# Patient Record
Sex: FEMALE | Race: WHITE | Employment: OTHER | URBAN - METROPOLITAN AREA
[De-identification: names, ages, dates, MRNs, and addresses within clinical notes are randomized per-mention and may not be internally consistent; named-entity substitution may affect disease eponyms.]

---

## 2017-03-07 DIAGNOSIS — E03.9 ACQUIRED HYPOTHYROIDISM: Primary | ICD-10-CM

## 2017-03-07 RX ORDER — LEVOTHYROXINE SODIUM 50 UG/1
TABLET ORAL
Qty: 30 TAB | Refills: 0 | OUTPATIENT
Start: 2017-03-07

## 2017-03-08 RX ORDER — LEVOTHYROXINE SODIUM 50 UG/1
TABLET ORAL
Qty: 30 TAB | Refills: 0 | Status: SHIPPED | OUTPATIENT
Start: 2017-03-08 | End: 2017-05-08 | Stop reason: SDUPTHER

## 2017-03-08 NOTE — TELEPHONE ENCOUNTER
Patient wants to come in tomorrow to have her thyroid labs drawn. She would need a few pills to last her until the results come back.

## 2017-03-08 NOTE — TELEPHONE ENCOUNTER
Please call pt to instruct her to have her thyroid lab work done asap (needs to be ordered). She was called in Jan but we hadn't heard back from her. Levothyroxine refused to pharmacy with instructions to call us.

## 2017-03-09 ENCOUNTER — HOSPITAL ENCOUNTER (OUTPATIENT)
Dept: LAB | Age: 72
Discharge: HOME | End: 2017-03-09
Payer: MEDICARE

## 2017-03-09 PROCEDURE — 36415 COLL VENOUS BLD VENIPUNCTURE: CPT

## 2017-03-09 PROCEDURE — 84443 ASSAY THYROID STIM HORMONE: CPT

## 2017-03-10 LAB — TSH SERPL DL<=0.005 MIU/L-ACNC: 4.46 UIU/ML (ref 0.45–4.5)

## 2017-03-13 ENCOUNTER — TELEPHONE (OUTPATIENT)
Dept: INTERNAL MEDICINE CLINIC | Age: 72
End: 2017-03-13

## 2017-03-13 NOTE — TELEPHONE ENCOUNTER
----- Message from Sanford Leonard MD sent at 3/12/2017 11:04 PM EDT -----  Please call patient - her TSH is BETTER but most patients feel better when the TSH is within the 1-3 range. She had previously felt better when stopping her thyroid medication, so I will not increase her dose unless she has complaints of fatigue. Let me know, otherwise will leave her dose the same.

## 2017-03-13 NOTE — TELEPHONE ENCOUNTER
Patient called back. I relayed 's message. Patiet said that she is having a little bit of fatigue but will discuss that with  when she comes into the office next month. Also patient would like a copy of the results mailed to her.

## 2017-03-13 NOTE — TELEPHONE ENCOUNTER
LVM on pt's mobile number requesting that she contact the office back to advise of MPL's result message.

## 2017-03-13 NOTE — TELEPHONE ENCOUNTER
Please call patient - her TSH is BETTER but most patients feel better when the TSH is within the 1-3 range. She had previously felt better when stopping her thyroid medication, so I will not increase her dose unless she has complaints of fatigue. Let me know, otherwise will leave her dose the same.

## 2017-04-27 ENCOUNTER — OFFICE VISIT (OUTPATIENT)
Dept: INTERNAL MEDICINE CLINIC | Age: 72
End: 2017-04-27

## 2017-04-27 ENCOUNTER — HOSPITAL ENCOUNTER (OUTPATIENT)
Dept: LAB | Age: 72
Discharge: HOME | End: 2017-04-27
Payer: MEDICARE

## 2017-04-27 VITALS
RESPIRATION RATE: 19 BRPM | TEMPERATURE: 97.6 F | SYSTOLIC BLOOD PRESSURE: 116 MMHG | HEIGHT: 64 IN | BODY MASS INDEX: 24.57 KG/M2 | WEIGHT: 143.9 LBS | OXYGEN SATURATION: 98 % | HEART RATE: 69 BPM | DIASTOLIC BLOOD PRESSURE: 62 MMHG

## 2017-04-27 DIAGNOSIS — E03.9 ACQUIRED HYPOTHYROIDISM: Primary | ICD-10-CM

## 2017-04-27 DIAGNOSIS — R06.02 SHORTNESS OF BREATH ON EXERTION: ICD-10-CM

## 2017-04-27 PROCEDURE — 84436 ASSAY OF TOTAL THYROXINE: CPT

## 2017-04-27 PROCEDURE — 36415 COLL VENOUS BLD VENIPUNCTURE: CPT

## 2017-04-27 PROCEDURE — 84443 ASSAY THYROID STIM HORMONE: CPT

## 2017-04-27 NOTE — PROGRESS NOTES
Chief Complaint   Patient presents with    Thyroid Problem     1. Have you been to the ER, urgent care clinic since your last visit? No  Hospitalized since your last visit? No    2. Have you seen or consulted any other health care providers outside of the [address] [place] [address] since your last visit? No Include any pap smears or colon screening.  No

## 2017-04-27 NOTE — MR AVS SNAPSHOT
Visit Information Date & Time Provider Department Dept. Phone Encounter #  
 4/27/2017  9:20 AM Sonya Mckeon MD [place] [id number] [facility] [phone] [id number] Follow-up Instructions Return in about 3 weeks (around 5/18/2017) for Wellness visit with Susana Ortiz. Upcoming Health Maintenance Date Due ZOSTER VACCINE AGE 60> 10/17/2005 MEDICARE YEARLY EXAM 2/2/2014 GLAUCOMA SCREENING Q2Y 6/25/2014 Pneumococcal 65+ Low/Medium Risk (2 of 2 - PCV13) 9/18/2014 BREAST CANCER SCRN MAMMOGRAM 9/19/2018 COLONOSCOPY 6/9/2019 DTaP/Tdap/Td series (2 - Td) 9/30/2026 Allergies as of 4/27/2017  Review Complete On: 4/27/2017 By: Chintan Whitney Severity Noted Reaction Type Reactions Talwin [Pentazocine Lactate] Medium 09/07/2012   Side Effect Unknown (comments) Altered sensorium Amoxicillin  04/22/2013    Other (comments) Diarrhea, stomach upset Darvon [Propoxyphene]  09/07/2012    Unknown (comments) Diamox  09/07/2012    Other (comments)  
 migraine Morphine  01/19/2011    Other (comments) Pt states she is very sensitive to all narcotics, feels like she will pass out Simvastatin  09/18/2013    Other (comments) Zithromax [Azithromycin]  01/14/2014    Other (comments) depression Current Immunizations  Reviewed on 4/27/2017 Name Date Influenza High Dose Vaccine PF 10/27/2016, 11/16/2015 Influenza Vaccine 10/22/2014, 10/25/2013 Influenza Vaccine Split 9/10/2012 Pneumococcal Polysaccharide (PPSV-23) 9/18/2013  1:05 PM  
 TD Vaccine 1/1/2006 Reviewed by Sonya Mckeon MD on 4/27/2017 at 10:06 AM  
 Reviewed by Sonya Mckeon MD on 4/27/2017 at 10:06 AM  
You Were Diagnosed With   
  
 Codes Comments Acquired hypothyroidism    -  Primary ICD-10-CM: E03.9 ICD-9-CM: 047. 9 Shortness of breath on exertion     ICD-10-CM: R06.02 
ICD-9-CM: 786.05 Vitals BP Pulse Temp Resp Height(growth percentile) Weight(growth percentile) 116/62 (BP 1 Location: Left arm, BP Patient Position: Sitting) 69 97.6 °F (36.4 °C) (Oral) 19 5' 4\" (1.626 m) 143 lb 14.4 oz (65.3 kg) SpO2 BMI OB Status Smoking Status 98% 24.7 kg/m2 Postmenopausal Former Smoker BMI and BSA Data Body Mass Index Body Surface Area 24.7 kg/m 2 1.72 m 2 Preferred Pharmacy Pharmacy Name Phone [address], [address] [phone] Your Updated Medication List  
  
   
This list is accurate as of: 4/27/17 10:08 AM.  Always use your most recent med list.  
  
  
  
  
 ammonium lactate 12 % topical cream  
Commonly known as:  AMLACTIN  
rub in to affected area well  
  
 escitalopram oxalate 10 mg tablet Commonly known as:  Oletha Dubs TAKE 1/2 TABLET BY MOUTH EVERY DAY. levothyroxine 50 mcg tablet Commonly known as:  SYNTHROID  
TAKE 1 TABLET BY MOUTH DAILY BEFORE BREAKFAST. SUMAtriptan 100 mg tablet Commonly known as:  IMITREX Take 1 Tab by mouth once as needed for Migraine. We Performed the Following TSH REFLEX TO T4 [[id number] Custom] Follow-up Instructions Return in about 3 weeks (around 5/18/2017) for Wellness visit with Shelby Reynolds. To-Do List   
 04/27/2017 Imaging:  XR CHEST PA LAT   
  
 04/28/2017 PFT:  PULMONARY FUNCTION TEST Introducing [facility]! Marci Almaraz introduces [employer] patient portal. Now you can access parts of your medical record, email your doctor's office, and request medication refills online. 1. In your internet browser, go to [email or web address] 2. Click on the First Time User? Click Here link in the Sign In box. You will see the New Member Sign Up page. 3. Enter your [employer] Access Code exactly as it appears below. You will not need to use this code after youve completed the sign-up process.  If you do not sign up before the expiration date, you must request a new code. · [employer] Access Code: [email or web address] Expires: 7/26/2017 10:08 AM 
 
4. Enter the last four digits of your Social Security Number (xxxx) and Date of Birth (mm/dd/yyyy) as indicated and click Submit. You will be taken to the next sign-up page. 5. Create a [facility] ID. This will be your [facility] login ID and cannot be changed, so think of one that is secure and easy to remember. 6. Create a [employer] password. You can change your password at any time. 7. Enter your Password Reset Question and Answer. This can be used at a later time if you forget your password. 8. Enter your e-mail address. You will receive e-mail notification when new information is available in [address] 9. Click Sign Up. You can now view and download portions of your medical record. 10. Click the Download Summary menu link to download a portable copy of your medical information. If you have questions, please visit the Frequently Asked Questions section of the [employer] website. Remember, [facility] is NOT to be used for urgent needs. For medical emergencies, dial 911. Now available from your iPhone and Android! Please provide this summary of care documentation to your next provider. Your primary care clinician is listed as Trish Zhang. If you have any questions after today's visit, please call [phone].

## 2017-04-27 NOTE — PROGRESS NOTES
HPI:  Betito Clark is a 70y.o. year old female who returns to clinic today for routine follow up appointment to discuss the issues below:    Here for f/u of hypothyroidism. She had gone off of levothyroxine prior to our Oct visit. Repeat TSH was 11.69 off med. She restarted 50 mcg daily. Recheck on 3/9 was 4.46. I didn't change her dose as she had previously told me that she hadn't felt well on a higher dose (reason for stopping altogether). On her own she increased to 75 mcg of levothyroxine. After 3/9. She continues to c/o intermittent fatigue. Also notes she gets winded with a slight incline. Others have pointed this out as well - friends / her brother. Her sister used to tell her she wheezes on the phone. No chest pain. Remote h/o tobacco use - 1/2 pack per week at most for 10 years, forty years ago. Had a nuclear stress test in 2013 for chest pain while doing water aerobics. Coronary calcium score of zero 12/2015. Prior to Admission medications    Medication Sig Start Date End Date Taking? Authorizing Provider   levothyroxine (SYNTHROID) 50 mcg tablet TAKE 1 TABLET BY MOUTH DAILY BEFORE BREAKFAST. 3/8/17  Yes Keli Bethea MD   escitalopram oxalate (LEXAPRO) 10 mg tablet TAKE 1/2 TABLET BY MOUTH EVERY DAY. 12/11/16  Yes Keli Bethea MD   ammonium lactate (AMLACTIN) 12 % topical cream rub in to affected area well 6/2/16  Yes Keli Bethea MD   SUMAtriptan (IMITREX) 100 mg tablet Take 1 Tab by mouth once as needed for Migraine.  1/14/14  Yes MORRIS Kebede          Allergies   Allergen Reactions    Talwin [Pentazocine Lactate] Unknown (comments)     Altered sensorium    Amoxicillin Other (comments)     Diarrhea, stomach upset    Darvon [Propoxyphene] Unknown (comments)    Diamox Other (comments)     migraine    Morphine Other (comments)     Pt states she is very sensitive to all narcotics, feels like she will pass out    Simvastatin Other (comments)    Zithromax [Azithromycin] Other (comments)     depression           ROS  See HPI      Physical Exam   Constitutional: She appears well-nourished. Neck: Carotid bruit is not present. No thyroid mass and no thyromegaly present. Cardiovascular: Normal rate, regular rhythm and normal heart sounds. No murmur heard. Pulses:       Carotid pulses are 2+ on the right side, and 2+ on the left side. Pulmonary/Chest: Effort normal and breath sounds normal.   Abdominal: Soft. Bowel sounds are normal. There is no hepatosplenomegaly. There is no tenderness. Musculoskeletal: She exhibits no edema. Visit Vitals    /62 (BP 1 Location: Left arm, BP Patient Position: Sitting)    Pulse 69    Temp 97.6 °F (36.4 °C) (Oral)    Resp 19    Ht 5' 4\" (1.626 m)    Wt 143 lb 14.4 oz (65.3 kg)    SpO2 98%    BMI 24.7 kg/m2         Assessment & Plan:  Joan Che was seen today for thyroid problem. Diagnoses and all orders for this visit:    Acquired hypothyroidism  Still fatigued. Recheck thyroid function after dose adjustment .   -     TSH REFLEX TO T4    Shortness of breath on exertion  Only seems to be on an incline but this has been a persistent complaint. I think it warrants further workup at this point. Will do a CXR today and have her schedule PFTs. I have a low suspicion for coronary disease as she had a normal stress test 3 1/2 years ago and a normal CT calcium score a year ago. -     XR CHEST PA LAT; Future  -     PULMONARY FUNCTION TEST; Future      Follow-up Disposition:  Return in about 3 weeks (around 5/18/2017) for Wellness visit with Harriet Galarza. Advised her to call back or return to office if symptoms worsen/change/persist.  Discussed expected course/resolution/complications of diagnosis in detail with patient. Medication risks/benefits/costs/interactions/alternatives discussed with patient.   She was given an after visit summary which includes diagnoses, current medications, & vitals. She expressed understanding with the diagnosis and plan.

## 2017-04-29 LAB
T4 SERPL-MCNC: 8.2 UG/DL (ref 4.5–12)
TSH SERPL DL<=0.005 MIU/L-ACNC: 5.08 UIU/ML (ref 0.45–4.5)

## 2017-05-05 ENCOUNTER — HOSPITAL ENCOUNTER (OUTPATIENT)
Dept: GENERAL RADIOLOGY | Age: 72
Discharge: HOME | End: 2017-05-05
Attending: INTERNAL MEDICINE
Payer: MEDICARE

## 2017-05-05 ENCOUNTER — HOSPITAL ENCOUNTER (OUTPATIENT)
Dept: PULMONOLOGY | Age: 72
Discharge: HOME | End: 2017-05-05
Attending: INTERNAL MEDICINE
Payer: MEDICARE

## 2017-05-05 DIAGNOSIS — R06.02 SHORTNESS OF BREATH ON EXERTION: ICD-10-CM

## 2017-05-05 PROCEDURE — 71020 XR CHEST PA LAT: CPT

## 2017-05-05 PROCEDURE — 94010 BREATHING CAPACITY TEST: CPT

## 2017-05-08 ENCOUNTER — TELEPHONE (OUTPATIENT)
Dept: INTERNAL MEDICINE CLINIC | Age: 72
End: 2017-05-08

## 2017-05-08 RX ORDER — LEVOTHYROXINE SODIUM 88 UG/1
88 TABLET ORAL
Qty: 30 TAB | Refills: 1 | Status: SHIPPED | OUTPATIENT
Start: 2017-05-08 | End: 2017-07-13 | Stop reason: SDUPTHER

## 2017-05-08 NOTE — TELEPHONE ENCOUNTER
Left home and mobile VM requesting Ms. Abhishek Adrian to contact the office back at her earliest convenience to advise of Alber Rhoades MD's result note and/or recommendations regarding her thyroid abd chest xray.

## 2017-05-08 NOTE — PROGRESS NOTES
Please call patient- her thyroid dose is still too low (she is takign 75 mcg daily). I recommend a small dose increase to 88 mcg daily. I have sent this to her pharmacy. I recommend a follow up with Renae Santo or new chosen provider in 6 weeks.

## 2017-05-08 NOTE — TELEPHONE ENCOUNTER
----- Message from [address], MD sent at 5/8/2017 12:05 AM EDT -----  Please call patient- her thyroid dose is still too low (she is takign 75 mcg daily). I recommend a small dose increase to 88 mcg daily. I have sent this to her pharmacy. I recommend a follow up with Adam Duff or new chosen provider in 6 weeks.

## 2017-05-09 NOTE — TELEPHONE ENCOUNTER
Spoke to pt - pt verified self and  for privacy precautions. Destiny Molina was advised of the recommendations/results provided by Jonathan Pisano MD . Ms. Everett Weller acknowledged understanding and all questions were answered to patients satisfaction. Pt states she would like the results to her pulmonary function test that was recently completed if it is available.

## 2017-05-10 ENCOUNTER — DOCUMENTATION ONLY (OUTPATIENT)
Dept: INTERNAL MEDICINE CLINIC | Age: 72
End: 2017-05-10

## 2017-05-10 NOTE — TELEPHONE ENCOUNTER
Her pulmonary function tests were reviewed today and were normal - she does not have asthma or COPD. If she develops any worsening of her shortness of breath I recommend to the office for further evaluation. For now I would recommend that she continue her regular exercise as she has been.

## 2017-05-10 NOTE — PROCEDURES
[address]   [address], [address]   PULMONARY FUNCTION       Name:  Jacinto Bowers   MR#:  [id number]   :  1945   Account #:  [de-identified]    Date of Procedure:  2017   Date of Adm:  2017       INDICATIONS: Shortness of breath. FINDINGS   SPIROMETRY   Normal FVC, normal FEV1, normal FEV1/FVC ratio. IMPRESSION: Normal spirometry.          MD Rajan Jones / Kirstie.Emery   D:  2017   15:30   T:  05/10/2017   01:49   Job #:  [id number]

## 2017-05-11 NOTE — TELEPHONE ENCOUNTER
Spoke to pt - pt verified self and  for privacy precautions. Destiny Molina was advised of the recommendations/results provided by Jonathan Pisano MD.  acknowledged understanding and all questions were answered to patients satisfaction. No further questions or concerns at this time.

## 2017-05-15 ENCOUNTER — OFFICE VISIT (OUTPATIENT)
Dept: INTERNAL MEDICINE CLINIC | Age: 72
End: 2017-05-15

## 2017-05-15 VITALS
SYSTOLIC BLOOD PRESSURE: 132 MMHG | HEART RATE: 67 BPM | OXYGEN SATURATION: 98 % | TEMPERATURE: 97.3 F | HEIGHT: 64 IN | BODY MASS INDEX: 24.59 KG/M2 | WEIGHT: 144 LBS | RESPIRATION RATE: 18 BRPM | DIASTOLIC BLOOD PRESSURE: 70 MMHG

## 2017-05-15 DIAGNOSIS — Z00.00 MEDICARE ANNUAL WELLNESS VISIT, SUBSEQUENT: Primary | ICD-10-CM

## 2017-05-15 DIAGNOSIS — Z23 NEED FOR PNEUMOCOCCAL VACCINATION: ICD-10-CM

## 2017-05-15 DIAGNOSIS — F32.A DEPRESSION, UNSPECIFIED DEPRESSION TYPE: ICD-10-CM

## 2017-05-15 DIAGNOSIS — Z78.0 MENOPAUSE: ICD-10-CM

## 2017-05-15 RX ORDER — ESCITALOPRAM OXALATE 10 MG/1
TABLET ORAL
Qty: 45 TAB | Refills: 1 | Status: SHIPPED | OUTPATIENT
Start: 2017-05-15 | End: 2017-06-26

## 2017-05-15 NOTE — PATIENT INSTRUCTIONS
Schedule of Personalized Health Plan  (Provide Copy to Patient)  The best way to stay healthy is to live a healthy lifestyle. A healthy lifestyle includes regular exercise, eating a well-balanced diet, keeping a healthy weight and not smoking. Regular physical exams and screening tests are another important way to take care of yourself. Preventive exams provided by health care providers can find health problems early when treatment works best and can keep you from getting certain diseases or illnesses. Preventive services include exams, lab tests, screenings, shots, monitoring and information to help you take care of your own health. All people over 65 should have a pneumonia shot. Pneumonia shots are usually only needed once in a lifetime unless your doctor decides differently. All people over 65 should have a yearly flu shot. People over 65 are at medium to high risk for Hepatitis B. Three shots are needed for complete protection. In addition to your physical exam, some screening tests are recommended:    Bone mass measurement (dexa scan) is recommended every two years  Diabetes Mellitus screening is recommended every year. Glaucoma is an eye disease caused by high pressure in the eye. An eye exam is recommended every year. Cardiovascular screening tests that check your cholesterol and other blood fat (lipid) levels are recommended every five years. Colorectal Cancer screening tests help to find pre-cancerous polyps (growths in the colon) so they can be removed before they turn into cancer. Tests ordered for screening depend on your personal and family history risk factors.     Screening for Breast Cancer is recommended yearly with a mammogram.    Screening for Cervical Cancer is recommended every two years (annually for certain risk factors, such as previous history of STD or abnormal PAP in past 7 years), with a Pelvic Exam with PAP    Here is a list of your current Health Maintenance items with a due date:  Health Maintenance   Topic Date Due    ZOSTER VACCINE AGE 60>  10/17/2005    GLAUCOMA SCREENING Q2Y  06/25/2014    Pneumococcal 65+ Low/Medium Risk (2 of 2 - PCV13) 09/18/2014    INFLUENZA AGE 9 TO ADULT  08/01/2017    MEDICARE YEARLY EXAM  05/16/2018    BREAST CANCER SCRN MAMMOGRAM  09/19/2018    COLONOSCOPY  06/09/2019    DTaP/Tdap/Td series (2 - Td) 09/30/2026    Hepatitis C Screening  Completed    OSTEOPOROSIS SCREENING (DEXA)  Completed

## 2017-05-15 NOTE — PROGRESS NOTES
Linette Gitelman is a 70 y.o. female and presents for annual Medicare Wellness Visit. Problem List: Reviewed with patient and discussed risk factors. Patient Active Problem List   Diagnosis Code    Acquired hypothyroidism E03.9    Hypercholesteremia E78.00    History of colonoscopy Z98.890    Environmental allergies Z91.09    Xerosis of skin L85.3    Vitamin D deficiency E55.9    Osteopenia M85.80    Adjustment disorder F43.20    Migraine headache G43.909    Family history of breast cancer in first degree relative Z80.3       Current medical providers:  Patient Care Team:  Loc Perea MD as PCP - General (Internal Medicine)  Michaela Murphy MD (Otolaryngology)  Sandi Whitehead MD (Otolaryngology)  Marizol Hdez MD (Dermatology)  Lisa Keys MD (Ophthalmology)    PSH: Reviewed with patient  Past Surgical History:   Procedure Laterality Date    ENDOSCOPY, COLON, DIAGNOSTIC  2009    neg. ([facility])-f/u 5 yrs.  HX DILATION AND CURETTAGE  1986    benign    HX HEENT  childhood    T&A        SH: Reviewed with patient  Social History   Substance Use Topics    Smoking status: Former Smoker     Years: 15.00    Smokeless tobacco: Never Used    Alcohol use 1.2 - 1.8 oz/week     0 - 1 Standard drinks or equivalent, 2 Glasses of wine per week      Comment: occasional       FH: Reviewed with patient  Family History   Problem Relation Age of Onset    Hypertension Mother     Heart Disease Mother 61     CHF/etoh    Arrhythmia Mother     Diabetes Father     Coronary Artery Disease Father 61     CABG (twice)   [facility] Breast Cancer Sister 61     (cured)       Medications/Allergies: Reviewed with patient  Current Outpatient Prescriptions on File Prior to Visit   Medication Sig Dispense Refill    levothyroxine (SYNTHROID) 88 mcg tablet Take 1 Tab by mouth Daily (before breakfast).  30 Tab 1    ammonium lactate (AMLACTIN) 12 % topical cream rub in to affected area well 280 g 3    SUMAtriptan (IMITREX) 100 mg tablet Take 1 Tab by mouth once as needed for Migraine. 9 Tab 1     No current facility-administered medications on file prior to visit. Allergies   Allergen Reactions    Talwin [Pentazocine Lactate] Unknown (comments)     Altered sensorium    Amoxicillin Other (comments)     Diarrhea, stomach upset    Darvon [Propoxyphene] Unknown (comments)    Diamox Other (comments)     migraine    Morphine Other (comments)     Pt states she is very sensitive to all narcotics, feels like she will pass out    Simvastatin Other (comments)    Zithromax [Azithromycin] Other (comments)     depression       Objective:  Visit Vitals    /70 (BP 1 Location: Left arm, BP Patient Position: Sitting)    Pulse 67    Temp 97.3 °F (36.3 °C) (Oral)    Resp 18    Ht 5' 4\" (1.626 m)    Wt 144 lb (65.3 kg)    SpO2 98%    BMI 24.72 kg/m2    Body mass index is 24.72 kg/(m^2). Assessment of cognitive impairment: Alert and oriented x 3    Depression Screen:   PHQ over the last two weeks 5/15/2017   PHQ Not Done -   Little interest or pleasure in doing things Not at all   Feeling down, depressed or hopeless Not at all   Total Score PHQ 2 0       Fall Risk Assessment:    Fall Risk Assessment, last 12 mths 5/15/2017   Able to walk? Yes   Fall in past 12 months? No       Functional Ability:   Does the patient exhibit a steady gait? yes   How long did it take the patient to get up and walk from a sitting position? 2 seconds   Is the patient self reliant?  (ie can do own laundry, meals, household chores)  yes     Does the patient handle his/her own medications? yes     Does the patient handle his/her own money? yes     Is the patients home safe (ie good lighting, handrails on stairs and bath, etc.)? yes     Did you notice or did patient express any hearing difficulties? no     Did you notice or did patient express any vision difficulties?   no     Were distance and reading eye charts used?   no       Advance Care Planning:   Patient was offered the opportunity to discuss advance care planning:  yes     Does patient have an Advance Directive:  yes   If no, did you provide information on Caring Connections?  no       Plan:      Orders Placed This Encounter    DEXA BONE DENSITY STUDY AXIAL    pneumococcal 13 anthony conj dip (PREVNAR-13) 0.5 mL syrg injection    escitalopram oxalate (LEXAPRO) 10 mg tablet       Health Maintenance   Topic Date Due    ZOSTER VACCINE AGE 60>  10/17/2005    GLAUCOMA SCREENING Q2Y  06/25/2014    Pneumococcal 65+ Low/Medium Risk (2 of 2 - PCV13) 09/18/2014    INFLUENZA AGE 9 TO ADULT  08/01/2017    MEDICARE YEARLY EXAM  05/16/2018    BREAST CANCER SCRN MAMMOGRAM  09/19/2018    COLONOSCOPY  06/09/2019    DTaP/Tdap/Td series (2 - Td) 09/30/2026    Hepatitis C Screening  Completed    OSTEOPOROSIS SCREENING (DEXA)  Completed       *Patient verbalized understanding and agreement with the plan. A copy of the After Visit Summary with personalized health plan was given to the patient today. Patient up to date on tdap, flu, and pneumovax. Prevnar ordered. Patient believes she has had Zostavax and will try to get record. Patient reports exercising frequently. She does water aerobics, cycling, and walking. She reports a healthy diet. She typically has oatmeal with fruit and nuts for breakfast, salad or sandwich for lunch, and lean meat with vegetable for dinner. Patient states she has advance medical directive and will bring to next appointment.

## 2017-05-15 NOTE — PROGRESS NOTES
Chief Complaint   Patient presents with   [facility] Annual Wellness Visit     1. Have you been to the ER, urgent care clinic since your last visit? N  Hospitalized since your last visit? No    2. Have you seen or consulted any other health care providers outside of the [address] [place] [address] since your last visit? No    Include any pap smears or colon screening.  No

## 2017-06-17 DIAGNOSIS — L85.3 XEROSIS OF SKIN: ICD-10-CM

## 2017-06-20 RX ORDER — AMMONIUM LACTATE 12 G/100G
CREAM TOPICAL
Qty: 280 G | Refills: 0 | Status: SHIPPED | OUTPATIENT
Start: 2017-06-20 | End: 2017-09-01 | Stop reason: SDUPTHER

## 2017-06-26 ENCOUNTER — OFFICE VISIT (OUTPATIENT)
Dept: INTERNAL MEDICINE CLINIC | Age: 72
End: 2017-06-26

## 2017-06-26 VITALS
WEIGHT: 146.6 LBS | DIASTOLIC BLOOD PRESSURE: 62 MMHG | RESPIRATION RATE: 16 BRPM | TEMPERATURE: 96.3 F | BODY MASS INDEX: 25.03 KG/M2 | OXYGEN SATURATION: 97 % | SYSTOLIC BLOOD PRESSURE: 130 MMHG | HEIGHT: 64 IN | HEART RATE: 67 BPM

## 2017-06-26 DIAGNOSIS — E03.9 ACQUIRED HYPOTHYROIDISM: ICD-10-CM

## 2017-06-26 DIAGNOSIS — E78.00 HYPERCHOLESTEREMIA: ICD-10-CM

## 2017-06-26 DIAGNOSIS — E55.9 VITAMIN D DEFICIENCY: ICD-10-CM

## 2017-06-26 DIAGNOSIS — M85.89 OSTEOPENIA OF MULTIPLE SITES: ICD-10-CM

## 2017-06-26 DIAGNOSIS — R06.00 DYSPNEA, UNSPECIFIED TYPE: Primary | ICD-10-CM

## 2017-06-26 NOTE — PROGRESS NOTES
Thyroid Problem ((Rm #10) 6wk f/u) and Medication Evaluation (levothyroxine dosage increased to 88mcg daily per Dr. July Lynn @ 4/27/17 visit and asked to f/u with new PCP in 6 wks)       HPI:  Mercedes Rascon is a 70y.o. year old female who is here to establish care. She  had her medical care:      [facility]    She reports the following history and medical concerns:      Hypothyroidism- recently increased to 88 mcg from 75 mcg. Was taking 5 mg lexapro and feels fine. Hx of cholesterol issues    SOB with exertion. No CP. Normally she can hold a conversation. Mostly taking a hill. Hx of right kidney stones. Assessment and Plan        1. Dyspnea, unspecified type  Will do stress test as she noticed less exercise tolerance without chest pain. - METABOLIC PANEL, COMPREHENSIVE  - CBC WITH AUTOMATED DIFF  - STRESS TEST CARDIAC; Future  - CBC WITH AUTOMATED DIFF; Future  - METABOLIC PANEL, COMPREHENSIVE; Future  - MICROALBUMIN, UR, RAND W/ MICROALBUMIN/CREA RATIO; Future    2. Osteopenia of multiple sites  Recheck DXA.   - DEXA BONE DENSITY STUDY AXIAL; Future    3. Acquired hypothyroidism  Recheck thyroid with new dose. No symptoms.  - TSH REFLEX TO T4  - TSH 3RD GENERATION; Future    4. Hypercholesteremia  Discussed chol. Will check again. Diet discussion.  - LIPID PANEL  - LIPID PANEL; Future  - URINALYSIS W/ RFLX MICROSCOPIC; Future    5. Vitamin D deficiency  May need to restart vit D.  - VITAMIN D, 25 HYDROXY; Future      6. Anxiety- wants to wean off lexapro-  Doesn't feel she needs it. Gave instructions how to wean off.         Visit Vitals    /62 (BP 1 Location: Left arm, BP Patient Position: Sitting)    Pulse 67    Temp 96.3 °F (35.7 °C) (Oral)    Resp 16    Ht 5' 4\" (1.626 m)    Wt 146 lb 9.6 oz (66.5 kg)    SpO2 97%    BMI 25.16 kg/m2       Historical Data    Past Medical History:   Diagnosis Date    Depression     Headache     migraines    Hypercholesterolemia     Hypothyroid     Nephrolithiasis     Scoliosis        Past Surgical History:   Procedure Laterality Date    ENDOSCOPY, COLON, DIAGNOSTIC  2009    neg. ([facility])-f/u 5 yrs.  HX DILATION AND CURETTAGE  1986    benign    HX HEENT  childhood    T&A       Outpatient Encounter Prescriptions as of 6/26/2017   Medication Sig Dispense Refill    ammonium lactate (AMLACTIN) 12 % topical cream RUB IN TO AFFECTED AREA WELL. 280 g 0    levothyroxine (SYNTHROID) 88 mcg tablet Take 1 Tab by mouth Daily (before breakfast). 30 Tab 1    escitalopram oxalate (LEXAPRO) 10 mg tablet TAKE 1/2 TABLET BY MOUTH EVERY DAY. 45 Tab 1    SUMAtriptan (IMITREX) 100 mg tablet Take 1 Tab by mouth once as needed for Migraine. 9 Tab 1     No facility-administered encounter medications on file as of 6/26/2017. Allergies   Allergen Reactions    Talwin [Pentazocine Lactate] Unknown (comments)     Altered sensorium    Amoxicillin Other (comments)     Diarrhea, stomach upset    Darvon [Propoxyphene] Unknown (comments)    Diamox Other (comments)     migraine    Morphine Other (comments)     Pt states she is very sensitive to all narcotics, feels like she will pass out    Simvastatin Other (comments)    Zithromax [Azithromycin] Other (comments)     depression        Social History     Social History    Marital status:      Spouse name: N/A    Number of children: N/A    Years of education: N/A     Occupational History    Not on file.      Social History Main Topics    Smoking status: Former Smoker     Years: 15.00    Smokeless tobacco: Never Used    Alcohol use 1.2 - 1.8 oz/week     0 - 1 Standard drinks or equivalent, 2 Glasses of wine per week      Comment: occasional    Drug use: No    Sexual activity: Yes     Partners: Male     Other Topics Concern    Not on file     Social History Narrative        family history includes Arrhythmia in her mother; Breast Cancer (age of onset: 61) in her sister; Coronary Artery Disease (age of onset: 61) in her father; Diabetes in her father; Heart Disease (age of onset: 61) in her mother; Hypertension in her mother. Review of Systems   Constitutional: Negative for weight loss. Eyes: Negative for blurred vision. Respiratory: Negative for shortness of breath. Cardiovascular: Negative for chest pain. Gastrointestinal: Negative for abdominal pain. Genitourinary: Negative for dysuria and frequency. Skin: Negative for rash. Neurological: Negative for dizziness, focal weakness, weakness and headaches. Endo/Heme/Allergies: Negative for environmental allergies. Does not bruise/bleed easily. Physical Exam   Constitutional: She appears well-developed and well-nourished. She is active. Non-toxic appearance. She does not have a sickly appearance. She does not appear ill. No distress. Eyes: Conjunctivae are normal. Right eye exhibits no discharge. Cardiovascular: Normal rate, regular rhythm, S1 normal, S2 normal, normal heart sounds and normal pulses. Exam reveals no gallop and no friction rub. Pulmonary/Chest: Effort normal and breath sounds normal. No respiratory distress. Abdominal: Soft. Bowel sounds are normal.   Musculoskeletal: She exhibits no edema or deformity. Neurological: She is alert. Skin: Skin is warm and dry. No rash noted. No pallor. Psychiatric: She has a normal mood and affect. Her behavior is normal.   Vitals reviewed. Ortho Exam       No orders of the defined types were placed in this encounter. I have reviewed the patient's medical history in detail and updated the computerized patient record. We had a prolonged discussion about these complex clinical issues and went over the various important aspects to consider. All questions were answered.      Advised her to call back or return to office if symptoms do not improve, change in nature, or persist.    She was given an after visit summary or informed of [employer] Access which includes patient instructions, diagnoses, current medications, & vitals. She expressed understanding with the diagnosis and plan.

## 2017-06-26 NOTE — PROGRESS NOTES
Identified pt with two pt identifiers(name and ). Reviewed record in preparation for visit and have obtained necessary documentation. Chief Complaint   Patient presents with    Thyroid Problem     (Rm #10) 6wk f/u    Medication Evaluation     levothyroxine dosage increased to 88mcg daily per Dr. Tung Ta @ 17 visit and asked to f/u with new PCP in 6 wks        Health Maintenance Due   Topic    ZOSTER VACCINE AGE 60>     GLAUCOMA SCREENING Q2Y     Pneumococcal 65+ Low/Medium Risk (2 of 2 - PCV13)       Coordination of Care Questionnaire:  :   1) Have you been to an emergency room, urgent care clinic since your last visit? no   Hospitalized since your last visit? no             2. Have seen or consulted any other health care provider since your last visit? NO  If yes, where when, and reason for visit? Patient is accompanied by self I have received verbal consent from Amy Navarrete to discuss any/all medical information while they are present in the room.

## 2017-06-27 ENCOUNTER — HOSPITAL ENCOUNTER (OUTPATIENT)
Dept: LAB | Age: 72
Discharge: HOME | End: 2017-06-27
Payer: MEDICARE

## 2017-06-27 PROCEDURE — 80061 LIPID PANEL: CPT

## 2017-06-27 PROCEDURE — 80053 COMPREHEN METABOLIC PANEL: CPT

## 2017-06-27 PROCEDURE — 36415 COLL VENOUS BLD VENIPUNCTURE: CPT

## 2017-06-27 PROCEDURE — 84443 ASSAY THYROID STIM HORMONE: CPT

## 2017-06-27 PROCEDURE — 85025 COMPLETE CBC W/AUTO DIFF WBC: CPT

## 2017-06-28 LAB
ALBUMIN SERPL-MCNC: 4.3 G/DL (ref 3.5–4.8)
ALBUMIN/GLOB SERPL: 2 {RATIO} (ref 1.2–2.2)
ALP SERPL-CCNC: 53 IU/L (ref 39–117)
ALT SERPL-CCNC: 19 IU/L (ref 0–32)
AST SERPL-CCNC: 24 IU/L (ref 0–40)
BASOPHILS # BLD AUTO: 0 X10E3/UL (ref 0–0.2)
BASOPHILS NFR BLD AUTO: 1 %
BILIRUB SERPL-MCNC: 0.3 MG/DL (ref 0–1.2)
BUN SERPL-MCNC: 14 MG/DL (ref 8–27)
BUN/CREAT SERPL: 21 (ref 12–28)
CALCIUM SERPL-MCNC: 9.4 MG/DL (ref 8.7–10.3)
CHLORIDE SERPL-SCNC: 103 MMOL/L (ref 96–106)
CHOLEST SERPL-MCNC: 259 MG/DL (ref 100–199)
CO2 SERPL-SCNC: 23 MMOL/L (ref 18–29)
CREAT SERPL-MCNC: 0.67 MG/DL (ref 0.57–1)
EOSINOPHIL # BLD AUTO: 0.1 X10E3/UL (ref 0–0.4)
EOSINOPHIL NFR BLD AUTO: 3 %
ERYTHROCYTE [DISTWIDTH] IN BLOOD BY AUTOMATED COUNT: 14.1 % (ref 12.3–15.4)
GLOBULIN SER CALC-MCNC: 2.2 G/DL (ref 1.5–4.5)
GLUCOSE SERPL-MCNC: 89 MG/DL (ref 65–99)
HCT VFR BLD AUTO: 44.3 % (ref 34–46.6)
HDLC SERPL-MCNC: 54 MG/DL
HGB BLD-MCNC: 14.2 G/DL (ref 11.1–15.9)
IMM GRANULOCYTES # BLD: 0 X10E3/UL (ref 0–0.1)
IMM GRANULOCYTES NFR BLD: 0 %
INTERPRETATION, 910389: NORMAL
LDLC SERPL CALC-MCNC: 181 MG/DL (ref 0–99)
LYMPHOCYTES # BLD AUTO: 1.5 X10E3/UL (ref 0.7–3.1)
LYMPHOCYTES NFR BLD AUTO: 42 %
MCH RBC QN AUTO: 29.3 PG (ref 26.6–33)
MCHC RBC AUTO-ENTMCNC: 32.1 G/DL (ref 31.5–35.7)
MCV RBC AUTO: 92 FL (ref 79–97)
MONOCYTES # BLD AUTO: 0.4 X10E3/UL (ref 0.1–0.9)
MONOCYTES NFR BLD AUTO: 10 %
NEUTROPHILS # BLD AUTO: 1.6 X10E3/UL (ref 1.4–7)
NEUTROPHILS NFR BLD AUTO: 44 %
PLATELET # BLD AUTO: 219 X10E3/UL (ref 150–379)
POTASSIUM SERPL-SCNC: 4.4 MMOL/L (ref 3.5–5.2)
PROT SERPL-MCNC: 6.5 G/DL (ref 6–8.5)
RBC # BLD AUTO: 4.84 X10E6/UL (ref 3.77–5.28)
SODIUM SERPL-SCNC: 143 MMOL/L (ref 134–144)
TRIGL SERPL-MCNC: 122 MG/DL (ref 0–149)
TSH SERPL DL<=0.005 MIU/L-ACNC: 0.16 UIU/ML (ref 0.45–4.5)
VLDLC SERPL CALC-MCNC: 24 MG/DL (ref 5–40)
WBC # BLD AUTO: 3.5 X10E3/UL (ref 3.4–10.8)

## 2017-06-29 NOTE — PROGRESS NOTES
LMOM to call to discuss thyroid and cholesterol. Thyroid is overactive now.   Signed electronically by: Harlon Olszewski, MD at 8:13 AM on June 29, 2017

## 2017-06-30 ENCOUNTER — TELEPHONE (OUTPATIENT)
Dept: INTERNAL MEDICINE CLINIC | Age: 72
End: 2017-06-30

## 2017-06-30 NOTE — PROGRESS NOTES
Talked with patient. Reduce cheese intake.   Change thyroid dose to 75 on weekends and 88 on weekdays  Signed electronically by: Sherren Lips, MD at 12:26 PM on June 30, 2017

## 2017-06-30 NOTE — TELEPHONE ENCOUNTER
Called in prescription as instructed for patient, to [facility] pharmacy.  Per Dr. Cande Ascencio

## 2017-06-30 NOTE — TELEPHONE ENCOUNTER
Called patient    Zander Margarito    Can you call in to her [facility] [address]    Take synthroid (generic ok)    88 mcg once a day on Monday and Friday  75 mcg once a day on Saturday and Sunday.     Call in one month with 12 refills

## 2017-06-30 NOTE — TELEPHONE ENCOUNTER
----- Message from Omid Holliday sent at 6/30/2017  8:22 AM EDT -----  Regarding: /telephone  Pt is returning call to doctor. Best contact [phone].

## 2017-07-13 ENCOUNTER — HOSPITAL ENCOUNTER (OUTPATIENT)
Dept: BONE DENSITY | Age: 72
Discharge: HOME | End: 2017-07-13
Attending: INTERNAL MEDICINE
Payer: MEDICARE

## 2017-07-13 ENCOUNTER — TELEPHONE (OUTPATIENT)
Dept: INTERNAL MEDICINE CLINIC | Age: 72
End: 2017-07-13

## 2017-07-13 DIAGNOSIS — M85.89 OSTEOPENIA OF MULTIPLE SITES: ICD-10-CM

## 2017-07-13 PROCEDURE — 77080 DXA BONE DENSITY AXIAL: CPT

## 2017-07-13 RX ORDER — LEVOTHYROXINE SODIUM 75 UG/1
75 TABLET ORAL
Qty: 30 TAB | Refills: 12 | Status: SHIPPED | OUTPATIENT
Start: 2017-07-13 | End: 2018-01-18 | Stop reason: SDUPTHER

## 2017-07-13 NOTE — TELEPHONE ENCOUNTER
Patient called stating that she is having headaches everyday. She thinks it may be her thyroid medication and wants to know 's thoughts on changing it.  Please call her back at [phone]

## 2017-07-13 NOTE — TELEPHONE ENCOUNTER
Ever since on the 88 mcg, she has had a headache like a bathing cap on top of head. No visual symptoms. No dizziness or fast HR. She stopped her meds completely because of it.    - go back to taking the 75 mcg and see if HA improves. If it doesn't I need to see you. Patient expressed understanding.     Signed electronically by: Nicole Hurtado MD at 12:26 PM on July 13, 2017

## 2017-07-21 ENCOUNTER — HOSPITAL ENCOUNTER (OUTPATIENT)
Dept: NON INVASIVE DIAGNOSTICS | Age: 72
Discharge: HOME | End: 2017-07-21
Attending: INTERNAL MEDICINE
Payer: MEDICARE

## 2017-07-21 DIAGNOSIS — R06.00 DYSPNEA, UNSPECIFIED TYPE: ICD-10-CM

## 2017-07-21 LAB
ATTENDING PHYSICIAN, CST07: NORMAL
DIAGNOSIS, 93000: NORMAL
DUKE TM SCORE RESULT, CST14: NORMAL
DUKE TREADMILL SCORE, CST13: NORMAL
ECG INTERP BEFORE EX, CST11: NORMAL
ECG INTERP DURING EX, CST12: NORMAL
FUNCTIONAL CAPACITY, CST17: NORMAL
KNOWN CARDIAC CONDITION, CST08: NORMAL
MAX. DIASTOLIC BP, CST04: 104 MMHG
MAX. HEART RATE, CST05: 142 BPM
MAX. SYSTOLIC BP, CST03: 182 MMHG
OVERALL BP RESPONSE TO EXERCISE, CST16: NORMAL
OVERALL HR RESPONSE TO EXERCISE, CST15: NORMAL
PEAK EX METS, CST10: 10.1 METS
PROTOCOL NAME, CST01: NORMAL
TEST INDICATION, CST09: NORMAL

## 2017-07-21 PROCEDURE — 93017 CV STRESS TEST TRACING ONLY: CPT

## 2017-07-21 NOTE — PROGRESS NOTES
Inform patient that the back and hips were now osteoporotic but the arm was. Try doing weight bearing exercise for the arms by doing wall push ups or push ups with her knees down on the floor.   Signed electronically by: Tyler Reyes MD at 12:22 PM on July 21, 2017

## 2017-07-27 ENCOUNTER — TELEPHONE (OUTPATIENT)
Dept: INTERNAL MEDICINE CLINIC | Age: 72
End: 2017-07-27

## 2017-09-01 DIAGNOSIS — L85.3 XEROSIS OF SKIN: ICD-10-CM

## 2017-09-01 RX ORDER — AMMONIUM LACTATE 12 G/100G
CREAM TOPICAL
Qty: 280 G | Refills: 0 | Status: SHIPPED | OUTPATIENT
Start: 2017-09-01 | End: 2017-11-15 | Stop reason: SDUPTHER

## 2017-11-15 DIAGNOSIS — L85.3 XEROSIS OF SKIN: ICD-10-CM

## 2017-11-15 RX ORDER — AMMONIUM LACTATE 12 G/100G
CREAM TOPICAL
Qty: 280 G | Refills: 0 | Status: SHIPPED | OUTPATIENT
Start: 2017-11-15 | End: 2018-01-15 | Stop reason: SDUPTHER

## 2017-11-16 ENCOUNTER — OFFICE VISIT (OUTPATIENT)
Dept: INTERNAL MEDICINE CLINIC | Age: 72
End: 2017-11-16

## 2017-11-16 VITALS
OXYGEN SATURATION: 96 % | TEMPERATURE: 100.3 F | RESPIRATION RATE: 14 BRPM | DIASTOLIC BLOOD PRESSURE: 100 MMHG | SYSTOLIC BLOOD PRESSURE: 180 MMHG | HEART RATE: 94 BPM

## 2017-11-16 DIAGNOSIS — J02.9 SORE THROAT: Primary | ICD-10-CM

## 2017-11-16 DIAGNOSIS — I10 HYPERTENSION, UNSPECIFIED TYPE: ICD-10-CM

## 2017-11-16 LAB
QUICKVUE INFLUENZA TEST: NEGATIVE
VALID INTERNAL CONTROL?: YES

## 2017-11-16 RX ORDER — TRAMADOL HYDROCHLORIDE 50 MG/1
50 TABLET ORAL
Qty: 10 TAB | Refills: 0 | Status: SHIPPED | OUTPATIENT
Start: 2017-11-16 | End: 2017-11-20

## 2017-11-16 RX ORDER — CEFUROXIME AXETIL 250 MG/1
250 TABLET ORAL 2 TIMES DAILY
Qty: 10 TAB | Refills: 0 | Status: SHIPPED | OUTPATIENT
Start: 2017-11-16 | End: 2017-11-26

## 2017-11-16 NOTE — PROGRESS NOTES
cefUROXime (CEFTIN) 250 mg tablet Take 1 Tab by mouth two (2) times a day for 10 days. 10 Tab 0    traMADol (ULTRAM) 50 mg tablet Take 1 Tab by mouth two (2) times daily as needed for Pain. Max Daily Amount: 100 mg. 10 Tab 0    ammonium lactate (AMLACTIN) 12 % topical cream RUB IN TO AFFECTED AREA WELL. 280 g 0    levothyroxine (SYNTHROID) 75 mcg tablet Take 1 Tab by mouth Daily (before breakfast). 30 Tab 12     No facility-administered encounter medications on file as of 11/16/2017. Allergies   Allergen Reactions    Talwin [Pentazocine Lactate] Unknown (comments)     Altered sensorium    Amoxicillin Other (comments)     Diarrhea, stomach upset    Darvon [Propoxyphene] Unknown (comments)    Diamox Other (comments)     migraine    Morphine Other (comments)     Pt states she is very sensitive to all narcotics, feels like she will pass out    Simvastatin Other (comments)    Zithromax [Azithromycin] Other (comments)     depression        Social History     Social History    Marital status:      Spouse name: N/A    Number of children: N/A    Years of education: N/A     Occupational History    Not on file. Social History Main Topics    Smoking status: Former Smoker     Years: 15.00    Smokeless tobacco: Never Used    Alcohol use 1.2 - 1.8 oz/week     0 - 1 Standard drinks or equivalent, 2 Glasses of wine per week      Comment: occasional    Drug use: No    Sexual activity: Yes     Partners: Male     Other Topics Concern    Not on file     Social History Narrative        family history includes Arrhythmia in her mother; Breast Cancer (age of onset: 61) in her sister; Coronary Artery Disease (age of onset: 61) in her father; Diabetes in her father; Heart Disease (age of onset: 61) in her mother; Hypertension in her mother. Review of Systems   Constitutional: Positive for chills and fever. Negative for diaphoresis, malaise/fatigue and weight loss.    HENT: Positive for congestion and sore throat. Negative for ear discharge and ear pain. Eyes: Negative for blurred vision. Respiratory: Positive for cough. Negative for hemoptysis, shortness of breath and wheezing. Cardiovascular: Negative for chest pain, palpitations and leg swelling. Gastrointestinal: Negative for abdominal pain, nausea and vomiting. Genitourinary: Negative. Musculoskeletal: Negative. Negative for myalgias. Skin: Negative for itching and rash. Neurological: Negative for dizziness, tremors, sensory change, focal weakness, weakness and headaches. Endo/Heme/Allergies: Negative for polydipsia. Psychiatric/Behavioral: Negative for depression. Physical Exam   Constitutional: No distress. HENT:   Right Ear: Tympanic membrane is bulging. Left Ear: Tympanic membrane is bulging. Nose: Rhinorrhea present. Right sinus exhibits maxillary sinus tenderness. Right sinus exhibits no frontal sinus tenderness. Left sinus exhibits maxillary sinus tenderness. Left sinus exhibits no frontal sinus tenderness. Mouth/Throat: Mucous membranes are normal. Mucous membranes are not pale. No oral lesions. No uvula swelling. Oropharyngeal exudate (throat red but no pustules.), posterior oropharyngeal edema and posterior oropharyngeal erythema present. No tonsillar abscesses. Eyes: Conjunctivae, EOM and lids are normal. Right eye exhibits no discharge. Left eye exhibits no discharge. No scleral icterus. No periorbital swelling bilaterally   Neck: Normal range of motion. Neck supple. No tracheal deviation present. Cardiovascular: Normal rate, regular rhythm and normal heart sounds. Pulmonary/Chest: No stridor. No respiratory distress. She has no wheezes. She has no rales. Lymphadenopathy:     She has no cervical adenopathy. Neurological: She is alert. Skin: Skin is warm and dry. No erythema. Psychiatric: Mood, affect and judgment normal.   Vitals reviewed.     Ortho Exam           I have reviewed the patient's medical history in detail and updated the computerized patient record. We had a prolonged discussion about these complex clinical issues and went over the various important aspects to consider. All questions were answered. Advised her to call back or return to office if symptoms do not improve, change in nature, or persist.    She was given an after visit summary or informed of [employer] Access which includes patient instructions, diagnoses, current medications, & vitals. She expressed understanding with the diagnosis and plan.

## 2017-11-16 NOTE — PROGRESS NOTES
Chief Complaint   Patient presents with    Cold Symptoms     Pt c/o cough, chest congestion, sore throat, and loss of voice x3-4 days. 1. Have you been to the ER, urgent care clinic since your last visit? Hospitalized since your last visit? [facility] 11/16/17    2. Have you seen or consulted any other health care providers outside of the [address] [place] [address] since your last visit? Include any pap smears or colon screening.  No

## 2017-11-17 ENCOUNTER — OFFICE VISIT (OUTPATIENT)
Dept: INTERNAL MEDICINE CLINIC | Age: 72
End: 2017-11-17

## 2017-11-17 VITALS — DIASTOLIC BLOOD PRESSURE: 64 MMHG | SYSTOLIC BLOOD PRESSURE: 108 MMHG | TEMPERATURE: 97.8 F

## 2017-11-17 DIAGNOSIS — J02.9 PHARYNGITIS, UNSPECIFIED ETIOLOGY: Primary | ICD-10-CM

## 2017-11-19 NOTE — PROGRESS NOTES
Patient came back for BP check. Her sore throat is about the same but she has no fever  No problems with antibiotics.     .  Visit Vitals    /64 (BP 1 Location: Left arm, BP Patient Position: Sitting)    Temp 97.8 °F (36.6 °C) (Oral)        Temp is 97.8

## 2017-11-20 ENCOUNTER — OFFICE VISIT (OUTPATIENT)
Dept: INTERNAL MEDICINE CLINIC | Age: 72
End: 2017-11-20

## 2017-11-20 ENCOUNTER — TELEPHONE (OUTPATIENT)
Dept: INTERNAL MEDICINE CLINIC | Age: 72
End: 2017-11-20

## 2017-11-20 VITALS
BODY MASS INDEX: 25.1 KG/M2 | WEIGHT: 147 LBS | DIASTOLIC BLOOD PRESSURE: 60 MMHG | SYSTOLIC BLOOD PRESSURE: 120 MMHG | HEART RATE: 68 BPM | RESPIRATION RATE: 16 BRPM | HEIGHT: 64 IN | OXYGEN SATURATION: 98 % | TEMPERATURE: 98.6 F

## 2017-11-20 DIAGNOSIS — R05.9 COUGH: Primary | ICD-10-CM

## 2017-11-20 DIAGNOSIS — I10 HYPERTENSION, UNSPECIFIED TYPE: ICD-10-CM

## 2017-11-20 RX ORDER — BENZONATATE 200 MG/1
200 CAPSULE ORAL
Qty: 20 CAP | Refills: 0 | Status: SHIPPED | OUTPATIENT
Start: 2017-11-20 | End: 2017-11-27

## 2017-11-20 RX ORDER — ESCITALOPRAM OXALATE 5 MG/1
TABLET ORAL DAILY
COMMUNITY
End: 2018-01-18 | Stop reason: SDUPTHER

## 2017-11-20 NOTE — PROGRESS NOTES
1. Have you been to the ER, urgent care clinic since your last visit? Hospitalized since your last visit? No    2. Have you seen or consulted any other health care providers outside of the [address] [place] [address] since your last visit? Include any pap smears or colon screening.  No  Chief Complaint   Patient presents with    Hypertension

## 2017-11-20 NOTE — PROGRESS NOTES
Primary Care Doctor is:  Janel Shields MD    Dizziness (sg believes it may be due to sinus pressure and congestion )         HPI:  Darya Brower is a 67y.o. year old female who is here for an acute care visit and has the following concerns:    Dizziness started-  3 am 2 days ago - throwing up. Traiminic. Not productive. Fever is gone. Feels like the room spinning - not seeing. When I stand up, I notice it more. Vomiting x 1. Throat pain is better. Cough through the night. Assessment and Plan        1. Cough  Increase hydration. Has some cyclical cough. Fever is gone now. Finish antibiotics. - benzonatate (TESSALON) 200 mg capsule; Take 1 Cap by mouth three (3) times daily as needed for Cough for up to 7 days. Dispense: 20 Cap; Refill: 0      2. High BP- now back to normal.  Pt denies HA or dizziness. Visit Vitals    /60 (BP 1 Location: Left arm, BP Patient Position: Sitting)    Pulse 68    Temp 98.6 °F (37 °C) (Oral)    Resp 16    Ht 5' 4\" (1.626 m)    Wt 147 lb (66.7 kg)    SpO2 98%    BMI 25.23 kg/m2       Historical Data    Past Medical History:   Diagnosis Date    Depression     Headache(784.0)     migraines    Hypercholesterolemia     Hypothyroid     Nephrolithiasis     Scoliosis        Past Surgical History:   Procedure Laterality Date    ENDOSCOPY, COLON, DIAGNOSTIC  2009    neg. ([facility])-f/u 5 yrs.  HX DILATION AND CURETTAGE  1986    benign    HX HEENT  childhood    T&A       Outpatient Encounter Prescriptions as of 11/20/2017   Medication Sig Dispense Refill    escitalopram oxalate (LEXAPRO) 5 mg tablet Take  by mouth daily.  cefUROXime (CEFTIN) 250 mg tablet Take 1 Tab by mouth two (2) times a day for 10 days. 10 Tab 0    ammonium lactate (AMLACTIN) 12 % topical cream RUB IN TO AFFECTED AREA WELL. 280 g 0    levothyroxine (SYNTHROID) 75 mcg tablet Take 1 Tab by mouth Daily (before breakfast).  30 Tab 12    [DISCONTINUED] traMADol (ULTRAM) 50 mg tablet Take 1 Tab by mouth two (2) times daily as needed for Pain. Max Daily Amount: 100 mg. 10 Tab 0     No facility-administered encounter medications on file as of 11/20/2017. Allergies   Allergen Reactions    Talwin [Pentazocine Lactate] Unknown (comments)     Altered sensorium    Amoxicillin Other (comments)     Diarrhea, stomach upset    Darvon [Propoxyphene] Unknown (comments)    Diamox Other (comments)     migraine    Morphine Other (comments)     Pt states she is very sensitive to all narcotics, feels like she will pass out    Simvastatin Other (comments)    Zithromax [Azithromycin] Other (comments)     depression        Social History     Social History    Marital status:      Spouse name: N/A    Number of children: N/A    Years of education: N/A     Occupational History    Not on file. Social History Main Topics    Smoking status: Former Smoker     Years: 15.00    Smokeless tobacco: Never Used    Alcohol use 1.2 - 1.8 oz/week     0 - 1 Standard drinks or equivalent, 2 Glasses of wine per week      Comment: occasional    Drug use: No    Sexual activity: Yes     Partners: Male     Other Topics Concern    Not on file     Social History Narrative        family history includes Arrhythmia in her mother; Breast Cancer (age of onset: 61) in her sister; Coronary Artery Disease (age of onset: 61) in her father; Diabetes in her father; Heart Disease (age of onset: 61) in her mother; Hypertension in her mother. Review of Systems   Constitutional: Negative for chills, diaphoresis, fever, malaise/fatigue and weight loss. HENT: Negative for hearing loss. Respiratory: Positive for cough. Cardiovascular: Negative for chest pain. Gastrointestinal: Negative for blood in stool and constipation. Genitourinary: Negative for dysuria, flank pain, frequency and urgency. Musculoskeletal: Negative for myalgias. Skin: Negative for rash.    Neurological: Positive for dizziness. Negative for weakness and headaches. Endo/Heme/Allergies: Does not bruise/bleed easily. Physical Exam   Constitutional: She is oriented to person, place, and time and well-developed, well-nourished, and in no distress. Vital signs are normal. She appears not dehydrated. She appears healthy. Non-toxic appearance. She does not have a sickly appearance. No distress. HENT:   Mouth/Throat: Oropharyngeal exudate present. Eyes: Conjunctivae are normal.   Cardiovascular: Normal rate and regular rhythm. Pulmonary/Chest: Effort normal and breath sounds normal.   Abdominal: Soft. Bowel sounds are normal. She exhibits no distension. There is no tenderness. Musculoskeletal: She exhibits no edema or deformity. Neurological: She is alert and oriented to person, place, and time. Gait normal.   Skin: Skin is warm and dry. Psychiatric: Mood and affect normal.     Ortho Exam           I have reviewed the patient's medical history in detail and updated the computerized patient record. We had a prolonged discussion about these complex clinical issues and went over the various important aspects to consider. All questions were answered. Advised her to call back or return to office if symptoms do not improve, change in nature, or persist.    She was given an after visit summary or informed of "[employer] Access which includes patient instructions, diagnoses, current medications, & vitals. She expressed understanding with the diagnosis and plan.

## 2017-11-20 NOTE — PATIENT INSTRUCTIONS
Cyclical cough    1. Rest voice  2. Warm water with honey  3. Lozenges in throat  4. Try to reduce clearing throat. Use the tessalon pearles to suppress cough by reduce the tickle in back of throat.

## 2017-11-20 NOTE — MR AVS SNAPSHOT
Visit Information Date & Time Provider Department Dept. Phone Encounter #  
 11/20/2017 11:15 AM MD [facility] [phone] [id number] Your Appointments 11/20/2017  1:00 PM  
Any with MD [facility]) Appt Note: blood pressure [address], [address] [facility] [address]  
[address], [place]  
  
    
 1/18/2018  1:45 PM  
COMPLETE PHYSICAL with MD [facility]) Appt Note: CPE/pt needs to sign ABN (not covered by Medicare) [address], [address]  
[address] [place] [address] [facility] [address] Upcoming Health Maintenance Date Due ZOSTER VACCINE AGE 60> 8/17/2005 GLAUCOMA SCREENING Q2Y 6/25/2014 Pneumococcal 65+ Low/Medium Risk (2 of 2 - PCV13) 9/18/2014 Influenza Age 5 to Adult 8/1/2017 MEDICARE YEARLY EXAM 5/16/2018 BREAST CANCER SCRN MAMMOGRAM 9/19/2018 COLONOSCOPY 6/9/2019 DTaP/Tdap/Td series (2 - Td) 9/30/2026 Allergies as of 11/20/2017  Review Complete On: 11/20/2017 By: Sigrid George Severity Noted Reaction Type Reactions Talwin [Pentazocine Lactate] Medium 09/07/2012   Side Effect Unknown (comments) Altered sensorium Amoxicillin  04/22/2013    Other (comments) Diarrhea, stomach upset Darvon [Propoxyphene]  09/07/2012    Unknown (comments) Diamox  09/07/2012    Other (comments)  
 migraine Morphine  01/19/2011    Other (comments) Pt states she is very sensitive to all narcotics, feels like she will pass out Simvastatin  09/18/2013    Other (comments) Zithromax [Azithromycin]  01/14/2014    Other (comments) depression Current Immunizations  Reviewed on 4/27/2017 Name Date Influenza High Dose Vaccine PF 10/27/2016, 11/16/2015 Influenza Vaccine 10/22/2014, 10/25/2013 Influenza Vaccine Split 9/10/2012 Pneumococcal Polysaccharide (PPSV-23) 9/18/2013  1:05 PM  
 TD Vaccine 1/1/2006 Tdap 9/30/2016 Not reviewed this visit You Were Diagnosed With   
  
 Codes Comments Cough    -  Primary ICD-10-CM: W16 ICD-9-CM: 198. 2 Vitals BP Pulse Temp Resp Height(growth percentile) Weight(growth percentile) 120/60 (BP 1 Location: Left arm, BP Patient Position: Sitting) 68 98.6 °F (37 °C) (Oral) 16 5' 4\" (1.626 m) 147 lb (66.7 kg) SpO2 BMI OB Status Smoking Status 98% 25.23 kg/m2 Postmenopausal Former Smoker Vitals History BMI and BSA Data Body Mass Index Body Surface Area  
 25.23 kg/m 2 1.74 m 2 Preferred Pharmacy Pharmacy Name Phone [address] [place] [address] [phone] Your Updated Medication List  
  
   
This list is accurate as of: 11/20/17 12:34 PM.  Always use your most recent med list.  
  
  
  
  
 ammonium lactate 12 % topical cream  
Commonly known as:  AMLACTIN  
RUB IN TO AFFECTED AREA WELL. benzonatate 200 mg capsule Commonly known as:  TESSALON Take 1 Cap by mouth three (3) times daily as needed for Cough for up to 7 days. cefUROXime 250 mg tablet Commonly known as:  CEFTIN Take 1 Tab by mouth two (2) times a day for 10 days. levothyroxine 75 mcg tablet Commonly known as:  SYNTHROID Take 1 Tab by mouth Daily (before breakfast). LEXAPRO 5 mg tablet Generic drug:  escitalopram oxalate Take  by mouth daily. Prescriptions Sent to Pharmacy Refills  
 benzonatate (TESSALON) 200 mg capsule 0 Sig: Take 1 Cap by mouth three (3) times daily as needed for Cough for up to 7 days. Class: Normal  
 Pharmacy: [address] [place] [address] #: [phone] Route: Oral  
  
Patient Instructions Cyclical cough 1. Rest voice 2. Warm water with honey 3. Lozenges in throat 4. Try to reduce clearing throat. Use the tessalon pearles to suppress cough by reduce the tickle in back of throat. Introducing [facility]! [facility] introduces THERAVECTYS patient portal. Now you can access parts of your medical record, email your doctor's office, and request medication refills online. 1. In your internet browser, go to [email or web address] 2. Click on the First Time User? Click Here link in the Sign In box. You will see the New Member Sign Up page. 3. Enter your THERAVECTYS Access Code exactly as it appears below. You will not need to use this code after youve completed the sign-up process. If you do not sign up before the expiration date, you must request a new code. · THERAVECTYS Access Code: [email or web address] Expires: 2/18/2018 12:34 PM 
 
4. Enter the last four digits of your Social Security Number (xxxx) and Date of Birth (mm/dd/yyyy) as indicated and click Submit. You will be taken to the next sign-up page. 5. Create a THERAVECTYS ID. This will be your THERAVECTYS login ID and cannot be changed, so think of one that is secure and easy to remember. 6. Create a THERAVECTYS password. You can change your password at any time. 7. Enter your Password Reset Question and Answer. This can be used at a later time if you forget your password. 8. Enter your e-mail address. You will receive e-mail notification when new information is available in [address] 9. Click Sign Up. You can now view and download portions of your medical record. 10. Click the Download Summary menu link to download a portable copy of your medical information. If you have questions, please visit the Frequently Asked Questions section of the THERAVECTYS website. Remember, THERAVECTYS is NOT to be used for urgent needs. For medical emergencies, dial 911. Now available from your iPhone and Android! Please provide this summary of care documentation to your next provider. Your primary care clinician is listed as Dom Galarza. If you have any questions after today's visit, please call [phone].

## 2017-11-20 NOTE — TELEPHONE ENCOUNTER
On Call Note         Primary Care Doctor: Shanta Gilbert     Reason for call: patient was given ceftin for an upper respiratory infection. She is on her 3rd day of antibiotics. She took a dose this morning at 1am and had some nausea and vomiting. Management:   -recommended patient take this medication with meals  -take a probiotic or eat a yogurt daily while on this antibiotic. Reviewed with patient that if symptoms are getting worse that she should call back or see a licensed provider at an urgent care or ER. Answered all questions to patient's satisfaction. Please call your PCP next business day for an office visit with any unresolved issues as face to face encounters provide better care with an exam than phone call conversations.     Signed By: Cristal Riojas MD     November 20, 2017

## 2017-12-01 ENCOUNTER — HOSPITAL ENCOUNTER (OUTPATIENT)
Dept: MRI IMAGING | Age: 72
Discharge: HOME | End: 2017-12-01
Attending: ORTHOPAEDIC SURGERY
Payer: MEDICARE

## 2017-12-01 DIAGNOSIS — M25.561 RIGHT KNEE PAIN: ICD-10-CM

## 2017-12-01 PROCEDURE — 73721 MRI JNT OF LWR EXTRE W/O DYE: CPT

## 2018-01-15 DIAGNOSIS — L85.3 XEROSIS OF SKIN: ICD-10-CM

## 2018-01-15 RX ORDER — AMMONIUM LACTATE 12 G/100G
CREAM TOPICAL
Qty: 280 G | Refills: 0 | Status: SHIPPED | OUTPATIENT
Start: 2018-01-15 | End: 2018-03-19 | Stop reason: SDUPTHER

## 2018-01-18 ENCOUNTER — OFFICE VISIT (OUTPATIENT)
Dept: INTERNAL MEDICINE CLINIC | Age: 73
End: 2018-01-18

## 2018-01-18 VITALS
OXYGEN SATURATION: 94 % | TEMPERATURE: 97.6 F | HEART RATE: 70 BPM | HEIGHT: 64 IN | SYSTOLIC BLOOD PRESSURE: 122 MMHG | RESPIRATION RATE: 14 BRPM | DIASTOLIC BLOOD PRESSURE: 72 MMHG | BODY MASS INDEX: 25.23 KG/M2 | WEIGHT: 147.8 LBS

## 2018-01-18 DIAGNOSIS — E78.00 HYPERCHOLESTEREMIA: Primary | ICD-10-CM

## 2018-01-18 DIAGNOSIS — E03.9 HYPOTHYROIDISM, UNSPECIFIED TYPE: ICD-10-CM

## 2018-01-18 DIAGNOSIS — E55.9 VITAMIN D DEFICIENCY: ICD-10-CM

## 2018-01-18 DIAGNOSIS — R06.00 DYSPNEA, UNSPECIFIED TYPE: ICD-10-CM

## 2018-01-18 DIAGNOSIS — Z12.39 BREAST CANCER SCREENING: ICD-10-CM

## 2018-01-18 DIAGNOSIS — R10.9 FLANK PAIN: ICD-10-CM

## 2018-01-18 LAB
BILIRUB UR QL STRIP: NEGATIVE
GLUCOSE UR-MCNC: NEGATIVE MG/DL
KETONES P FAST UR STRIP-MCNC: NEGATIVE MG/DL
PH UR STRIP: 6 [PH] (ref 4.6–8)
PROT UR QL STRIP: NEGATIVE
SP GR UR STRIP: 1.02 (ref 1–1.03)
UA UROBILINOGEN AMB POC: NORMAL (ref 0.2–1)
URINALYSIS CLARITY POC: CLEAR
URINALYSIS COLOR POC: YELLOW
URINE BLOOD POC: NORMAL
URINE LEUKOCYTES POC: NEGATIVE
URINE NITRITES POC: NEGATIVE

## 2018-01-18 RX ORDER — ESCITALOPRAM OXALATE 5 MG/1
5 TABLET ORAL DAILY
Qty: 90 TAB | Refills: 3 | Status: SHIPPED | OUTPATIENT
Start: 2018-01-18 | End: 2018-08-17 | Stop reason: SDUPTHER

## 2018-01-18 RX ORDER — LEVOTHYROXINE SODIUM 75 UG/1
75 TABLET ORAL
Qty: 90 TAB | Refills: 3 | Status: SHIPPED | OUTPATIENT
Start: 2018-01-18 | End: 2018-04-01 | Stop reason: SDUPTHER

## 2018-01-18 NOTE — PROGRESS NOTES
Chief Complaint   Patient presents with    Complete Physical     1. Have you been to the ER, urgent care clinic since your last visit? Hospitalized since your last visit? No    2. Have you seen or consulted any other health care providers outside of the [address] [place] [address] since your last visit? Include any pap smears or colon screening.  No

## 2018-01-18 NOTE — PROGRESS NOTES
Let her know the urine showed no blood. Likely it is muscle. Apply tiger balm to area. Please let her know today as she thought she had a kidney stone.   Signed electronically by: Wanda Martini MD at 3:11 PM on January 18, 2018

## 2018-01-18 NOTE — PATIENT INSTRUCTIONS
N[place]ic natural fish oil twice a day. No crustaceans- shrimps, scallops, crab  Ok for fish  Lean meats ok. Like turkey chicken. No cheese. Zero fat yogurt ok. Drink almond milk ok. ACC risk calculator evaluates risk for heart disease and stroke. HEALTHY SWEETS  How much: Sparingly  Healthy choices: Unsweetened dried fruit, dark chocolate, fruit sorbet  Why: Dark chocolate provides polyphenols with antioxidant activity. Choose dark chocolate with at least 70 percent pure cocoa and have an ounce a few times a week. Fruit sorbet is a better option than other frozen desserts. RED WINE  How much: Optional, no more than 1-2 glasses per day  Healthy choices: Organic red wine   Why: Red wine has beneficial antioxidant activity. Limit intake to no more than 1-2 servings per day. If you do not drink alcohol, do not start. SUPPLEMENTS  How much: Daily   Healthy choices: High quality multivitamin/multimineral that includes key antioxidants (vitamin C, vitamin E, mixed carotenoids, and selenium); co-enzyme Q10; 2-3 grams of a molecularly distilled fish oil; 2,000 IU of vitamin D3   Why: Supplements help fill any gaps in your diet when you are unable to get your daily requirement of micronutrients. Click here to learn more about supplements and get your free recommendation. TEA  How much: 2-4 cups per day  Healthy choices: White, green, oolong teas  Why: Tea is rich in catechins, antioxidant compounds that reduce inflammation. Purchase high-quality tea and learn how to correctly brew it for maximum taste and health benefits. HEALTHY HERBS & SPICES  How much: Unlimited amounts  Healthy choices: Turmeric, uribe powder (which contains turmeric), lio and garlic (dried and fresh), chili peppers, basil, cinnamon, rosemary, thyme  Why: Use these herbs and spices generously to season foods. Turmeric and lio are powerful, natural anti-inflammatory agents.   OTHER SOURCES OF PROTEIN  How much: 1-2 servings a week (one portion is equal to 1 ounce of cheese, 1 eight-ounce serving of dairy, 1 egg, 3 ounces cooked poultry or skinless meat)  Healthy choices: High quality natural cheese and yogurt, omega-3 enriched eggs, skinless poultry, grass-fed lean meats  Why: In general, try to reduce consumption of animal foods. If you eat chicken, choose organic, cage-free chicken and remove the skin and associated fat. Use organic, reduced-fat dairy products moderately, especially yogurt and natural cheeses such as Emmental (Swiss), [place] and true ROSS. If you eat eggs, choose omega-3 enriched eggs (made by feeding hens a flax-meal-enriched diet), or organic eggs from free-range chickens. COOKED  MUSHROOMS  How much: Unlimited amounts  Healthy choices: Shiitake, enokidake, maitake, oyster mushrooms (and wild mushrooms if available)   Why: These mushrooms contain compounds that enhance immune function. Never eat mushrooms raw, and minimize consumption of common commercial button mushrooms (including crimini and portobello). WHOLE SOY FOODS  How much: 1-2 servings per day (one serving is equal to ½ cup tofu or tempeh, 1 cup soymilk, ½ cup cooked edamame, 1 ounce of soynuts)  Healthy choices: Tofu, tempeh, edamame, soy nuts, soymilk  Why: Soy foods contain isoflavones that have antioxidant activity and are protective against cancer. Choose whole soy foods over fractionated foods like isolated soy protein powders and imitation meats made with soy isolate. FISH & SEAFOOD  How much:  2-6 servings per week (one serving is equal to 4 ounces of fish or seafood)  Healthy choices: Wild [place] salmon (especially sockeye), herring, sardines, and black cod (sablefish)  Why: These fish are rich in omega-3 fats, which are strongly anti-inflammatory. If you choose not to eat fish, take a molecularly distilled fish oil supplement that provides both EPA and DHA in a dose of 2-3 grams per day.   HEALTHY FATS  How much:  5-7 servings per day (one serving is equal to 1 teaspoon of oil, 2 walnuts, 1 tablespoon of flaxseed, 1 ounce of avocado)   Healthy choices: For cooking, use extra virgin olive oil and expeller-pressed organic canola oil. Other sources of healthy fats include nuts (especially walnuts), avocados, and seeds - including hemp seeds and freshly ground flaxseed. Omega-3 fats are also found in cold water fish, omega-3 enriched eggs, and whole soy foods. Organic, expeller pressed, high-oleic sunflower or safflower oils may also be used, as well as walnut and hazelnut oils in salads and dark roasted sesame oil as a flavoring for soups and stir-fries  Why: Healthy fats are those rich in either monounsaturated or omega-3 fats. Extra-virgin olive oil is rich in polyphenols with antioxidant activity and canola oil contains a small fraction of omega-3 fatty acids. WHOLE & CRACKED GRAINS  How much:  3-5 servings a day (one serving is equal to about ½ cup cooked grains)  Healthy choices: [place], basmati rice, wild rice, buckwheat, groats, barley, quinoa, steel-cut oats   Why: Whole grains digest slowly, reducing frequency of spikes in blood sugar that promote inflammation. \"Whole grains\" means grains that are intact or in a few large pieces, not whole wheat bread or other products made from flour. PASTA (al dente)  How much: 2-3 servings per week (one serving is equal to about ½ cup cooked pasta)  Healthy choices: Organic pasta, rice noodles, bean thread noodles, and part whole wheat and buckwheat noodles like Japanese udon and soba  Why: Pasta cooked al dente (when it has \"tooth\" to it) has a lower glycemic index than fully-cooked pasta. Low-glycemic-load carbohydrates should be the bulk of your carbohydrate intake to help minimize spikes in blood glucose levels.   BEANS & LEGUMES  How much: 1-2 servings per day (one serving is equal to ½ cup cooked beans or legumes)  Healthy choices: Beans like [place], adzuki and black, as well as chickpeas, black-eyed peas and lentils  Why: Beans are rich in folic acid, magnesium, potassium and soluble fiber. They are a low-glycemic-load food. Eat them well-cooked either whole or pureed into spreads like hummus. VEGETABLES  How much: 4-5 servings per day minimum (one serving is equal to 2 cups salad greens, ½ cup vegetables cooked, raw or juiced)  Healthy Choices: Lightly cooked dark leafy greens (spinach, brandy greens, kale, Swiss chard), cruciferous vegetables (broccoli, cabbage, [place] sprouts, kale, bok tessie and cauliflower), carrots, beets, onions, peas, squashes, sea vegetables and washed raw salad greens  Why: Vegetables are rich in flavonoids and carotenoids with both antioxidant and anti-inflammatory activity. Go for a wide range of colors, eat them both raw and cooked, and choose organic when possible. FRUITS  How much:  3-4 servings per day (one serving is equal to 1 medium size piece of fruit, ½ cup chopped fruit, ¼ cup of dried fruit)  Healthy choices: Raspberries, blueberries, strawberries, peaches, nectarines, oranges, pink grapefruit, red grapes, plums, pomegranates, blackberries, cherries, apples, and pears - all lower in glycemic load than most tropical fruits  Why: Fruits are rich in flavonoids and carotenoids with both antioxidant and anti-inflammatory activity. Go for a wide range of colors, choose fruit that is fresh in season or frozen, and buy organic when possible. Additional Item:  WATER  How much: Throughout the day  Healthy choices: Drink pure water, or drinks that are mostly water (tea, very diluted fruit juice, sparkling water with lemon) throughout the day. Why: Water is vital for overall functioning of the body.         Dr. Mamie Francois

## 2018-01-18 NOTE — MR AVS SNAPSHOT
[address] [facility] [address], [address] [place] [address] 
[phone] Patient: Rosemary La MRN: [id number] :1945 Visit Information Date & Time Provider Department Dept. Phone Encounter #  
 2018  1:45 PM MD Dilia Bolaños [facility] [id number] [phone] Upcoming Health Maintenance Date Due  
 GLAUCOMA SCREENING Q2Y 2014 Pneumococcal 65+ Low/Medium Risk (2 of 2 - PCV13) 2014 MEDICARE YEARLY EXAM 2018 BREAST CANCER SCRN MAMMOGRAM 2018 COLONOSCOPY 2019 DTaP/Tdap/Td series (2 - Td) 2026 Allergies as of 2018  Review Complete On: 2018 By: Sayad Osborne LPN Severity Noted Reaction Type Reactions Talwin [Pentazocine Lactate] Medium 2012   Side Effect Unknown (comments) Altered sensorium Amoxicillin  2013    Other (comments) Diarrhea, stomach upset Darvon [Propoxyphene]  2012    Unknown (comments) Diamox  2012    Other (comments)  
 migraine Morphine  2011    Other (comments) Pt states she is very sensitive to all narcotics, feels like she will pass out Simvastatin  2013    Other (comments) Zithromax [Azithromycin]  2014    Other (comments) depression Current Immunizations  Reviewed on 2017 Name Date Influenza High Dose Vaccine PF 10/27/2016, 2015 Influenza Vaccine 2017, 10/22/2014, 10/25/2013 Influenza Vaccine Split 9/10/2012 Pneumococcal Polysaccharide (PPSV-23) 2013  1:05 PM  
 TD Vaccine 2006 Tdap 2016 Not reviewed this visit You Were Diagnosed With   
  
 Codes Comments Hypercholesteremia    -  Primary ICD-10-CM: E78.00 ICD-9-CM: 272.0 Vitamin D deficiency     ICD-10-CM: E55.9 ICD-9-CM: 268.9 Hypothyroidism, unspecified type     ICD-10-CM: E03.9 ICD-9-CM: 244.9 Breast cancer screening     ICD-10-CM: Z12.31 
ICD-9-CM: V76.10 Flank pain     ICD-10-CM: R10.9 ICD-9-CM: 789.09 Vitals BP Pulse Temp Resp Height(growth percentile) Weight(growth percentile) 122/72 (BP 1 Location: Left arm, BP Patient Position: Sitting) 70 97.6 °F (36.4 °C) (Oral) 14 5' 4\" (1.626 m) 147 lb 12.8 oz (67 kg) SpO2 BMI OB Status Smoking Status 94% 25.37 kg/m2 Postmenopausal Former Smoker Vitals History BMI and BSA Data Body Mass Index Body Surface Area  
 25.37 kg/m 2 1.74 m 2 Preferred Pharmacy Pharmacy Name Phone [address], [address] [phone] Your Updated Medication List  
  
   
This list is accurate as of: 18  2:43 PM.  Always use your most recent med list.  
  
  
  
  
 ammonium lactate 12 % topical cream  
Commonly known as:  AMLACTIN  
RUB IN TO AFFECTED AREA WELL. escitalopram oxalate 5 mg tablet Commonly known as:  Veola Fujita Take 1 Tab by mouth daily. levothyroxine 75 mcg tablet Commonly known as:  SYNTHROID Take 1 Tab by mouth Daily (before breakfast). pneumococcal 13 anthony conj dip 0.5 mL Syrg injection Commonly known as:  PREVNAR-13  
0.5 mL by IntraMUSCular route once for 1 dose. Prescriptions Printed Refills  
 pneumococcal 13 anthony conj dip (PREVNAR-13) 0.5 mL syrg injection 0 Si.5 mL by IntraMUSCular route once for 1 dose. Class: Print Route: IntraMUSCular  
 levothyroxine (SYNTHROID) 75 mcg tablet 3 Sig: Take 1 Tab by mouth Daily (before breakfast). Class: Print Route: Oral  
 escitalopram oxalate (LEXAPRO) 5 mg tablet 3 Sig: Take 1 Tab by mouth daily. Class: Print Route: Oral  
  
We Performed the Following AMB POC URINALYSIS DIP STICK AUTO W/O MICRO [97408 CPT(R)] To-Do List   
 2018 Imaging:  CODY MAMMO BI SCREENING INCL CAD   
  
 2018   Lab:  CBC WITH AUTOMATED DIFF   
 02/08/2018 Lab:  LIPID PANEL   
  
 02/08/2018 Lab:  METABOLIC PANEL, COMPREHENSIVE   
  
 02/08/2018 Lab:  MICROALBUMIN, UR, RAND W/ MICROALBUMIN/CREA RATIO   
  
 02/08/2018 Lab:  TSH 3RD GENERATION   
  
 02/08/2018 Lab:  UA/M W/RFLX CULTURE, ROUTINE   
  
 02/08/2018 Lab:  VITAMIN D, 25 HYDROXY Patient Instructions Nordic natural fish oil twice a day. No crustaceans- shrimps, scallops, crab Ok for fish Lean meats ok. Like turkey chicken. No cheese. Zero fat yogurt ok. Drink almond milk ok. ACC risk calculator evaluates risk for heart disease and stroke. HEALTHY SWEETS How much: Sparingly Healthy choices: Unsweetened dried fruit, dark chocolate, fruit sorbet Why: Dark chocolate provides polyphenols with antioxidant activity. Choose dark chocolate with at least 70 percent pure cocoa and have an ounce a few times a week. Fruit sorbet is a better option than other frozen desserts. RED WINE How much: Optional, no more than 1-2 glasses per day Healthy choices: Organic red wine Why: Red wine has beneficial antioxidant activity. Limit intake to no more than 1-2 servings per day. If you do not drink alcohol, do not start. SUPPLEMENTS How much: Daily Healthy choices: High quality multivitamin/multimineral that includes key antioxidants (vitamin C, vitamin E, mixed carotenoids, and selenium); co-enzyme Q10; 2-3 grams of a molecularly distilled fish oil; 2,000 IU of vitamin D3 Why: Supplements help fill any gaps in your diet when you are unable to get your daily requirement of micronutrients. Click here to learn more about supplements and get your free recommendation. TEA How much: 2-4 cups per day Healthy choices: White, green, oolong teas Why: Tea is rich in catechins, antioxidant compounds that reduce inflammation. Purchase high-quality tea and learn how to correctly brew it for maximum taste and health benefits.  
[facility] 
 How much: Unlimited amounts Healthy choices: Turmeric, uribe powder (which contains turmeric), lio and garlic (dried and fresh), chili peppers, basil, cinnamon, rosemary, thyme Why: Use these herbs and spices generously to season foods. Turmeric and lio are powerful, natural anti-inflammatory agents. OTHER SOURCES OF PROTEIN How much: 1-2 servings a week (one portion is equal to 1 ounce of cheese, 1 eight-ounce serving of dairy, 1 egg, 3 ounces cooked poultry or skinless meat) Healthy choices: High quality natural cheese and yogurt, omega-3 enriched eggs, skinless poultry, grass-fed lean meats Why: In general, try to reduce consumption of animal foods. If you eat chicken, choose organic, cage-free chicken and remove the skin and associated fat. Use organic, reduced-fat dairy products moderately, especially yogurt and natural cheeses such as EmmMuseAmi (Swiss), [place] the [place] and true ROSS. If you eat eggs, choose omega-3 enriched eggs (made by feeding hens a flax-meal-enriched diet), or organic eggs from free-range chickens. [facility] How much: Unlimited amounts Healthy choices: Shiitake, enokidake, maitake, oyster mushrooms (and wild mushrooms if available) Why: These mushrooms contain compounds that enhance immune function. Never eat mushrooms raw, and minimize consumption of common commercial button mushrooms (including crimini and portobello). WHOLE SOY FOODS How much: 1-2 servings per day (one serving is equal to ½ cup tofu or tempeh, 1 cup soymilk, ½ cup cooked edamame, 1 ounce of soynuts) Healthy choices: Tofu, tempeh, edamame, soy nuts, soymilk Why: Soy foods contain isoflavones that have antioxidant activity and are protective against cancer. Choose whole soy foods over fractionated foods like isolated soy protein powders and imitation meats made with soy isolate. FISH & SEAFOOD How much:  2-6 servings per week (one serving is equal to 4 ounces of fish or seafood) Healthy choices: Wild [place] salmon (especially sockeye), herring, sardines, and black cod (sablefish) Why: These fish are rich in omega-3 fats, which are strongly anti-inflammatory. If you choose not to eat fish, take a molecularly distilled fish oil supplement that provides both EPA and DHA in a dose of 2-3 grams per day. HEALTHY FATS How much:  5-7 servings per day (one serving is equal to 1 teaspoon of oil, 2 walnuts, 1 tablespoon of flaxseed, 1 ounce of avocado) Healthy choices: For cooking, use extra virgin olive oil and expeller-pressed organic canola oil. Other sources of healthy fats include nuts (especially walnuts), avocados, and seeds - including hemp seeds and freshly ground flaxseed. Omega-3 fats are also found in cold water fish, omega-3 enriched eggs, and whole soy foods. Organic, expeller pressed, high-oleic sunflower or safflower oils may also be used, as well as walnut and hazelnut oils in salads and dark roasted sesame oil as a flavoring for soups and stir-fries Why: Healthy fats are those rich in either monounsaturated or omega-3 fats. Extra-virgin olive oil is rich in polyphenols with antioxidant activity and canola oil contains a small fraction of omega-3 fatty acids. WHOLE & CRACKED GRAINS How much:  3-5 servings a day (one serving is equal to about ½ cup cooked grains) Healthy choices: Brown rice, basmati rice, wild rice, buckwheat, groats, barley, quinoa, steel-cut oats Why: Whole grains digest slowly, reducing frequency of spikes in blood sugar that promote inflammation. \"Whole grains\" means grains that are intact or in a few large pieces, not whole wheat bread or other products made from flour. PASTA (al dente) How much: 2-3 servings per week (one serving is equal to about ½ cup cooked pasta) Healthy choices: Organic pasta, rice noodles, bean thread noodles, and part whole wheat and buckwheat noodles like [place] udon and soba Why: Pasta cooked al dente (when it has \"tooth\" to it) has a lower glycemic index than fully-cooked pasta. Low-glycemic-load carbohydrates should be the bulk of your carbohydrate intake to help minimize spikes in blood glucose levels. BEANS & LEGUMES How much: 1-2 servings per day (one serving is equal to ½ cup cooked beans or legumes) Healthy choices: Beans like [place], adzuki and black, as well as chickpeas, black-eyed peas and lentils Why: Beans are rich in folic acid, magnesium, potassium and soluble fiber. They are a low-glycemic-load food. Eat them well-cooked either whole or pureed into spreads like hummus. VEGETABLES How much: 4-5 servings per day minimum (one serving is equal to 2 cups salad greens, ½ cup vegetables cooked, raw or juiced) Healthy Choices: Lightly cooked dark leafy greens (spinach, brandy greens, kale, Swiss chard), cruciferous vegetables (broccoli, cabbage, [place] sprouts, kale, bok tessie and cauliflower), carrots, beets, onions, peas, squashes, sea vegetables and washed raw salad greens Why: Vegetables are rich in flavonoids and carotenoids with both antioxidant and anti-inflammatory activity. Go for a wide range of colors, eat them both raw and cooked, and choose organic when possible. FRUITS How much:  3-4 servings per day (one serving is equal to 1 medium size piece of fruit, ½ cup chopped fruit, ¼ cup of dried fruit) Healthy choices: Raspberries, blueberries, strawberries, peaches, nectarines, oranges, pink grapefruit, red grapes, plums, pomegranates, blackberries, cherries, apples, and pears - all lower in glycemic load than most tropical fruits Why: Fruits are rich in flavonoids and carotenoids with both antioxidant and anti-inflammatory activity. Go for a wide range of colors, choose fruit that is fresh in season or frozen, and buy organic when possible. Additional Item: 
WATER How much: Throughout the day Healthy choices: Drink pure water, or drinks that are mostly water (tea, very diluted fruit juice, sparkling water with lemon) throughout the day. Why: Water is vital for overall functioning of the body. Dr. Sean Marx Introducing [facility]! Sarahi Woodall introduces [employer] patient portal. Now you can access parts of your medical record, email your doctor's office, and request medication refills online. 1. In your internet browser, go to [email or web address] 2. Click on the First Time User? Click Here link in the Sign In box. You will see the New Member Sign Up page. 3. Enter your [employer] Access Code exactly as it appears below. You will not need to use this code after youve completed the sign-up process. If you do not sign up before the expiration date, you must request a new code. · [employer] Access Code: [email or web address] Expires: 2/18/2018 12:34 PM 
 
4. Enter the last four digits of your Social Security Number (xxxx) and Date of Birth (mm/dd/yyyy) as indicated and click Submit. You will be taken to the next sign-up page. 5. Create a [employer] ID. This will be your [employer] login ID and cannot be changed, so think of one that is secure and easy to remember. 6. Create a [employer] password. You can change your password at any time. 7. Enter your Password Reset Question and Answer. This can be used at a later time if you forget your password. 8. Enter your e-mail address. You will receive e-mail notification when new information is available in [address] 9. Click Sign Up. You can now view and download portions of your medical record. 10. Click the Download Summary menu link to download a portable copy of your medical information. If you have questions, please visit the Frequently Asked Questions section of the [employer] website. Remember, [employer] is NOT to be used for urgent needs. For medical emergencies, dial 911. Now available from your iPhone and Android! Please provide this summary of care documentation to your next provider. Your primary care clinician is listed as Kalani Galan. If you have any questions after today's visit, please call [phone].

## 2018-01-18 NOTE — PROGRESS NOTES
Cholesterol Problem (follow up) and Hypothyroidism       HPI:  Ellyn Lancaster is a 67y.o. year old female who is here for follow up. She reports the following history and medical concerns:      Cholesterol LDL was in 200's in 2014. Now in 180's. She says it has some genetic component as she eats healthy. Her LDL upon review however has gone done to 150's. She is not sure how it went down since her diet is the same. She didn't do labs for today but she has been eating a lot of bad foods over the holidays. She is not keen about going on statins. Patient has a cardiac CT in 2015 and her score was zero. Also had a nuclear stress test in 2013 that was normal.  Patient before her injury exercises without chest pain. She has not been exercising because of recent surgery and tear of knee. Torn ACL knee. Hypothyroidism    Patient is being follow for underactive thyroid and takes medication regularly. She is aware to take the medication on an empty stomach first thing in the morning and not to eat 30 min to 1 hr after taking their pill. No symptoms of hypo or hyperthyroidism: no decreased or increased weight, no feeling cold/chilly or excessively warm, no diarrhea or constipation, no undue sweatiness, anxiety or palpitations. Lab Results   Component Value Date/Time    TSH 0.163 06/27/2017 09:35 AM    TSH 0.387 07/15/2016 03:49 PM       last visit her thyroid was adjusted downwards as TSH was low. Did not get it rechecked. Patient recently saw me and her BP was extremely high when she was sick. She has returned back to her normal.  Has checked it also outside our office. Has noticed in last week left side back pain. Hurts with movement. Hx of kidney stones and wondering if she is getting one. No blood in urine. More discomfort with twisting.      Health Maintenance Due   Topic Date Due    GLAUCOMA SCREENING Q2Y  06/25/2014    Pneumococcal 65+ Low/Medium Risk (2 of 2 - PCV13) 09/18/2014 Health Maintenance reviewed - Pneumovax given for prevnar as rx        Assessment and Plan        1. Hypercholesteremia  Long discussion about her diet and genetics. Recheck in 4 weeks. Eliminate cheese. Anti-inflammatory diet given to her. - METABOLIC PANEL, COMPREHENSIVE; Future  - LIPID PANEL; Future  - MICROALBUMIN, UR, RAND W/ MICROALBUMIN/CREA RATIO; Future  - CBC WITH AUTOMATED DIFF; Future  - UA/M W/RFLX CULTURE, ROUTINE; Future    2. Vitamin D deficiency  Patient compliant with Vit D. Emphasized importance of taking with food and not too much because it can be toxic to our body. Therefore, it should be checked regularly. Levels ordered. - VITAMIN D, 25 HYDROXY; Future    3. Hypothyroidism, unspecified type  On 75 mcg. Had headaches when on 88 mcg. Recheck TSH in 4 weeks. No symptoms of underactive thyroid now. - TSH 3RD GENERATION; Future    4. Breast cancer screening  Patient aware the above test was ordered and should call central scheduling or our office if no one contacts them. Discussed the importance and reason for the test -   - CODY MAMMO BI SCREENING INCL CAD; Future    5. Flank pain  Likely spasm of muscle based on exam.  Will check urine for blood. - AMB POC URINALYSIS DIP STICK AUTO W/O MICRO  Epsom salt baths    . Visit Vitals    /72 (BP 1 Location: Left arm, BP Patient Position: Sitting)    Pulse 70    Temp 97.6 °F (36.4 °C) (Oral)    Resp 14    Ht 5' 4\" (1.626 m)    Wt 147 lb 12.8 oz (67 kg)    SpO2 94%    BMI 25.37 kg/m2       Historical Data    Past Medical History:   Diagnosis Date    Depression     Headache(784.0)     migraines    Hypercholesterolemia     Hypothyroid     Nephrolithiasis     Scoliosis        Past Surgical History:   Procedure Laterality Date    ENDOSCOPY, COLON, DIAGNOSTIC  2009    neg. ([facility])-f/u 5 yrs.     HX DILATION AND CURETTAGE  1986    benign    HX HEENT  childhood    T&A       Outpatient Encounter Prescriptions as of 1/18/2018   Medication Sig Dispense Refill    ammonium lactate (AMLACTIN) 12 % topical cream RUB IN TO AFFECTED AREA WELL. 280 g 0    escitalopram oxalate (LEXAPRO) 5 mg tablet Take  by mouth daily.  levothyroxine (SYNTHROID) 75 mcg tablet Take 1 Tab by mouth Daily (before breakfast). 30 Tab 12     No facility-administered encounter medications on file as of 1/18/2018. Allergies   Allergen Reactions    Talwin [Pentazocine Lactate] Unknown (comments)     Altered sensorium    Amoxicillin Other (comments)     Diarrhea, stomach upset    Darvon [Propoxyphene] Unknown (comments)    Diamox Other (comments)     migraine    Morphine Other (comments)     Pt states she is very sensitive to all narcotics, feels like she will pass out    Simvastatin Other (comments)    Zithromax [Azithromycin] Other (comments)     depression        Social History     Social History    Marital status:      Spouse name: N/A    Number of children: N/A    Years of education: N/A     Occupational History    Not on file. Social History Main Topics    Smoking status: Former Smoker     Years: 15.00    Smokeless tobacco: Never Used    Alcohol use 1.2 - 1.8 oz/week     0 - 1 Standard drinks or equivalent, 2 Glasses of wine per week      Comment: occasional    Drug use: No    Sexual activity: Yes     Partners: Male     Other Topics Concern    Not on file     Social History Narrative        family history includes Arrhythmia in her mother; Breast Cancer (age of onset: 61) in her sister; Coronary Artery Disease (age of onset: 61) in her father; Diabetes in her father; Heart Disease (age of onset: 61) in her mother; Hypertension in her mother. Review of Systems   Constitutional: Negative for weight loss. Eyes: Negative for blurred vision. Respiratory: Negative for shortness of breath. Cardiovascular: Negative for chest pain. Gastrointestinal: Negative for abdominal pain.    Genitourinary: Negative for dysuria and frequency. Skin: Negative for rash. Neurological: Negative for dizziness, focal weakness, weakness and headaches. Endo/Heme/Allergies: Negative for environmental allergies. Does not bruise/bleed easily. Physical Exam   Constitutional: She is oriented to person, place, and time. She appears well-developed and well-nourished. No distress. HENT:   Mouth/Throat: No oropharyngeal exudate. Eyes: Conjunctivae are normal. Pupils are equal, round, and reactive to light. Left eye exhibits no discharge. No scleral icterus. Neck: No thyromegaly present. Cardiovascular: Normal rate, regular rhythm and normal heart sounds. Exam reveals no gallop and no friction rub. No murmur heard. Pulmonary/Chest: No respiratory distress. Abdominal: Soft. Bowel sounds are normal. She exhibits no distension and no mass. Musculoskeletal: Normal range of motion. She exhibits no edema or deformity. Arms:  Lymphadenopathy:     She has no cervical adenopathy. Neurological: She is alert and oriented to person, place, and time. Skin: Skin is warm and dry. No erythema. Psychiatric: She has a normal mood and affect. Judgment and thought content normal.   Nursing note and vitals reviewed. Ortho Exam       No orders of the defined types were placed in this encounter. I have reviewed the patient's medical history in detail and updated the computerized patient record. We had a prolonged discussion about these complex clinical issues and went over the various important aspects to consider. All questions were answered. Advised her to call back or return to office if symptoms do not improve, change in nature, or persist.    She was given an after visit summary or informed of [employer] Access which includes patient instructions, diagnoses, current medications, & vitals. She expressed understanding with the diagnosis and plan.

## 2018-01-25 ENCOUNTER — OFFICE VISIT (OUTPATIENT)
Dept: INTERNAL MEDICINE CLINIC | Age: 73
End: 2018-01-25

## 2018-01-25 VITALS
WEIGHT: 147 LBS | HEIGHT: 64 IN | HEART RATE: 63 BPM | BODY MASS INDEX: 25.1 KG/M2 | RESPIRATION RATE: 16 BRPM | TEMPERATURE: 97.1 F | OXYGEN SATURATION: 97 %

## 2018-01-25 DIAGNOSIS — E07.89 THYROID FULLNESS: Primary | ICD-10-CM

## 2018-01-25 DIAGNOSIS — Z20.828 EXPOSURE TO INFLUENZA: ICD-10-CM

## 2018-01-25 RX ORDER — OSELTAMIVIR PHOSPHATE 75 MG/1
75 CAPSULE ORAL 2 TIMES DAILY
Qty: 10 CAP | Refills: 0 | Status: SHIPPED | OUTPATIENT
Start: 2018-01-25 | End: 2018-01-30

## 2018-01-25 NOTE — PROGRESS NOTES
Primary Care Doctor is:  Jd Segura MD    Other (took friend to ED that was diagnosied with the flu. patiet would like script for tamiflu ) and Neck Pain (sore neck x 1 week )         HPI:  Kerry De Paz is a 67y.o. year old female who is here for an acute care visit and has the following concerns:    Friend Tuesday night went to the ER. She was diagnosed with the flu. Possibly pneumonia. Was on antibiotics. Saw her two days ago. Next day after she saw me, developed neck soreness. Not a sore throat. Felt like a bruise. When I swallow feels like passage way is smaller and yet she has no dysphagia. Back pain is gone. Assessment and Plan        1. Thyroid fullness  Mild swelling of thyroid but no nodule. Ultrasound ordered for patient. May need barium swallow if symptoms persist as it may be functional.   - US THYROID/PARATHYROID/SOFT TISS; Future    2. Exposure to influenza  I told patient she has to consider possible neurological side effects of tamiflu and that I don't want her to take a preventive regimen. She did such a great job of not touching anything and washing and cleaning her hands. I did give her a prescription in case over the weekend she developed fever over 101 and muscles aches. I told her that she should not lightly take the medication because of its neuro side effects. Visit Vitals    Pulse 63    Temp 97.1 °F (36.2 °C) (Oral)    Resp 16    Ht 5' 4\" (1.626 m)    Wt 147 lb (66.7 kg)    SpO2 97%    BMI 25.23 kg/m2       Historical Data    Past Medical History:   Diagnosis Date    Depression     Headache(784.0)     migraines    Hypercholesterolemia     Hypothyroid     Nephrolithiasis     Scoliosis        Past Surgical History:   Procedure Laterality Date    ENDOSCOPY, COLON, DIAGNOSTIC  2009    neg. ([facility])-f/u 5 yrs.     HX DILATION AND CURETTAGE  1986    benign    HX HEENT  childhood    T&A       Outpatient Encounter Prescriptions as of 1/25/2018   Medication Sig Dispense Refill    oseltamivir (TAMIFLU) 75 mg capsule Take 1 Cap by mouth two (2) times a day for 5 days. Indications: INFLUENZA 10 Cap 0    levothyroxine (SYNTHROID) 75 mcg tablet Take 1 Tab by mouth Daily (before breakfast). 90 Tab 3    escitalopram oxalate (LEXAPRO) 5 mg tablet Take 1 Tab by mouth daily. 90 Tab 3    ammonium lactate (AMLACTIN) 12 % topical cream RUB IN TO AFFECTED AREA WELL. 280 g 0     No facility-administered encounter medications on file as of 1/25/2018. Allergies   Allergen Reactions    Talwin [Pentazocine Lactate] Unknown (comments)     Altered sensorium    Amoxicillin Other (comments)     Diarrhea, stomach upset    Darvon [Propoxyphene] Unknown (comments)    Diamox Other (comments)     migraine    Morphine Other (comments)     Pt states she is very sensitive to all narcotics, feels like she will pass out    Simvastatin Other (comments)    Zithromax [Azithromycin] Other (comments)     depression        Social History     Social History    Marital status:      Spouse name: N/A    Number of children: N/A    Years of education: N/A     Occupational History    Not on file. Social History Main Topics    Smoking status: Former Smoker     Years: 15.00    Smokeless tobacco: Never Used    Alcohol use 1.2 - 1.8 oz/week     0 - 1 Standard drinks or equivalent, 2 Glasses of wine per week      Comment: occasional    Drug use: No    Sexual activity: Yes     Partners: Male     Other Topics Concern    Not on file     Social History Narrative        family history includes Arrhythmia in her mother; Breast Cancer (age of onset: 61) in her sister; Coronary Artery Disease (age of onset: 61) in her father; Diabetes in her father; Heart Disease (age of onset: 61) in her mother; Hypertension in her mother. Review of Systems   Constitutional: Negative for chills, fever and weight loss. HENT: Negative for congestion.     Eyes: Negative for blurred vision. Respiratory: Negative for shortness of breath. Cardiovascular: Negative for chest pain. Gastrointestinal: Negative for abdominal pain, heartburn, nausea and vomiting. Genitourinary: Negative for dysuria and frequency. Musculoskeletal: Negative for myalgias. Skin: Negative for rash. Neurological: Negative for dizziness, focal weakness, weakness and headaches. Endo/Heme/Allergies: Negative for environmental allergies. Does not bruise/bleed easily. Physical Exam   Constitutional: She is oriented to person, place, and time and well-developed, well-nourished, and in no distress. Vital signs are normal. She appears not dehydrated. She appears healthy. Non-toxic appearance. She does not have a sickly appearance. No distress. Eyes: Conjunctivae are normal.   Neck: Normal range of motion. No muscular tenderness present. Thyromegaly (fullness bilaterally but no nodules palpated) present. No thyroid mass present. Cardiovascular: Normal rate and regular rhythm. Pulmonary/Chest: Effort normal and breath sounds normal.   Abdominal: Soft. Bowel sounds are normal. She exhibits no distension. There is no tenderness. Musculoskeletal: She exhibits no edema or deformity. Neurological: She is alert and oriented to person, place, and time. Gait normal.   Skin: Skin is warm and dry. Psychiatric: Mood and affect normal.     Ortho Exam           I have reviewed the patient's medical history in detail and updated the computerized patient record. We had a prolonged discussion about these complex clinical issues and went over the various important aspects to consider. All questions were answered. Advised her to call back or return to office if symptoms do not improve, change in nature, or persist.    She was given an after visit summary or informed of [employer]. Access which includes patient instructions, diagnoses, current medications, & vitals.   She expressed understanding with the diagnosis and plan.

## 2018-01-31 ENCOUNTER — HOSPITAL ENCOUNTER (OUTPATIENT)
Dept: ULTRASOUND IMAGING | Age: 73
Discharge: HOME | End: 2018-01-31
Attending: INTERNAL MEDICINE
Payer: MEDICARE

## 2018-01-31 ENCOUNTER — HOSPITAL ENCOUNTER (OUTPATIENT)
Dept: MAMMOGRAPHY | Age: 73
Discharge: HOME | End: 2018-01-31
Attending: INTERNAL MEDICINE
Payer: MEDICARE

## 2018-01-31 DIAGNOSIS — E07.89 THYROID FULLNESS: ICD-10-CM

## 2018-01-31 DIAGNOSIS — Z12.31 VISIT FOR SCREENING MAMMOGRAM: ICD-10-CM

## 2018-01-31 PROCEDURE — 76536 US EXAM OF HEAD AND NECK: CPT

## 2018-01-31 PROCEDURE — 77063 BREAST TOMOSYNTHESIS BI: CPT

## 2018-02-01 ENCOUNTER — TELEPHONE (OUTPATIENT)
Dept: INTERNAL MEDICINE CLINIC | Age: 73
End: 2018-02-01

## 2018-03-19 DIAGNOSIS — L85.3 XEROSIS OF SKIN: ICD-10-CM

## 2018-03-19 RX ORDER — AMMONIUM LACTATE 12 G/100G
CREAM TOPICAL
Qty: 280 G | Refills: 0 | Status: SHIPPED | OUTPATIENT
Start: 2018-03-19 | End: 2018-10-29 | Stop reason: SDUPTHER

## 2018-03-30 ENCOUNTER — HOSPITAL ENCOUNTER (OUTPATIENT)
Dept: LAB | Age: 73
Discharge: HOME | End: 2018-03-30
Payer: MEDICARE

## 2018-03-30 PROCEDURE — 36415 COLL VENOUS BLD VENIPUNCTURE: CPT

## 2018-03-30 PROCEDURE — 85025 COMPLETE CBC W/AUTO DIFF WBC: CPT

## 2018-03-30 PROCEDURE — 82306 VITAMIN D 25 HYDROXY: CPT

## 2018-03-30 PROCEDURE — 84443 ASSAY THYROID STIM HORMONE: CPT

## 2018-03-30 PROCEDURE — 80061 LIPID PANEL: CPT

## 2018-03-30 PROCEDURE — 80053 COMPREHEN METABOLIC PANEL: CPT

## 2018-03-30 PROCEDURE — 82043 UR ALBUMIN QUANTITATIVE: CPT

## 2018-03-30 PROCEDURE — 81001 URINALYSIS AUTO W/SCOPE: CPT

## 2018-03-31 LAB
25(OH)D3+25(OH)D2 SERPL-MCNC: 29 NG/ML (ref 30–100)
ALBUMIN SERPL-MCNC: 4.5 G/DL (ref 3.5–4.8)
ALBUMIN/CREAT UR: <3.4 MG/G CREAT (ref 0–30)
ALBUMIN/GLOB SERPL: 2 {RATIO} (ref 1.2–2.2)
ALP SERPL-CCNC: 57 IU/L (ref 39–117)
ALT SERPL-CCNC: 19 IU/L (ref 0–32)
APPEARANCE UR: CLEAR
AST SERPL-CCNC: 26 IU/L (ref 0–40)
BACTERIA #/AREA URNS HPF: NORMAL /[HPF]
BASOPHILS # BLD AUTO: 0 X10E3/UL (ref 0–0.2)
BASOPHILS NFR BLD AUTO: 1 %
BILIRUB SERPL-MCNC: 0.3 MG/DL (ref 0–1.2)
BILIRUB UR QL STRIP: NEGATIVE
BUN SERPL-MCNC: 17 MG/DL (ref 8–27)
BUN/CREAT SERPL: 24 (ref 12–28)
CALCIUM SERPL-MCNC: 9.4 MG/DL (ref 8.7–10.3)
CASTS URNS QL MICRO: NORMAL /LPF
CHLORIDE SERPL-SCNC: 102 MMOL/L (ref 96–106)
CHOLEST SERPL-MCNC: 240 MG/DL (ref 100–199)
CO2 SERPL-SCNC: 26 MMOL/L (ref 18–29)
COLOR UR: YELLOW
CREAT SERPL-MCNC: 0.7 MG/DL (ref 0.57–1)
CREAT UR-MCNC: 87.1 MG/DL
EOSINOPHIL # BLD AUTO: 0.1 X10E3/UL (ref 0–0.4)
EOSINOPHIL NFR BLD AUTO: 3 %
EPI CELLS #/AREA URNS HPF: NORMAL /HPF
ERYTHROCYTE [DISTWIDTH] IN BLOOD BY AUTOMATED COUNT: 15.5 % (ref 12.3–15.4)
GFR SERPLBLD CREATININE-BSD FMLA CKD-EPI: 100 ML/MIN/1.73
GFR SERPLBLD CREATININE-BSD FMLA CKD-EPI: 87 ML/MIN/1.73
GLOBULIN SER CALC-MCNC: 2.2 G/DL (ref 1.5–4.5)
GLUCOSE SERPL-MCNC: 93 MG/DL (ref 65–99)
GLUCOSE UR QL: NEGATIVE
HCT VFR BLD AUTO: 44 % (ref 34–46.6)
HDLC SERPL-MCNC: 47 MG/DL
HGB BLD-MCNC: 14.4 G/DL (ref 11.1–15.9)
HGB UR QL STRIP: ABNORMAL
IMM GRANULOCYTES # BLD: 0 X10E3/UL (ref 0–0.1)
IMM GRANULOCYTES NFR BLD: 0 %
INTERPRETATION, 910389: NORMAL
KETONES UR QL STRIP: NEGATIVE
LDLC SERPL CALC-MCNC: 170 MG/DL (ref 0–99)
LEUKOCYTE ESTERASE UR QL STRIP: NEGATIVE
LYMPHOCYTES # BLD AUTO: 1.4 X10E3/UL (ref 0.7–3.1)
LYMPHOCYTES NFR BLD AUTO: 37 %
MCH RBC QN AUTO: 28.7 PG (ref 26.6–33)
MCHC RBC AUTO-ENTMCNC: 32.7 G/DL (ref 31.5–35.7)
MCV RBC AUTO: 88 FL (ref 79–97)
MICRO URNS: ABNORMAL
MICROALBUMIN UR-MCNC: <3 UG/ML
MONOCYTES # BLD AUTO: 0.4 X10E3/UL (ref 0.1–0.9)
MONOCYTES NFR BLD AUTO: 11 %
MUCOUS THREADS URNS QL MICRO: PRESENT
NEUTROPHILS # BLD AUTO: 1.9 X10E3/UL (ref 1.4–7)
NEUTROPHILS NFR BLD AUTO: 48 %
NITRITE UR QL STRIP: NEGATIVE
PH UR STRIP: 6 [PH] (ref 5–7.5)
PLATELET # BLD AUTO: 222 X10E3/UL (ref 150–379)
POTASSIUM SERPL-SCNC: 4.6 MMOL/L (ref 3.5–5.2)
PROT SERPL-MCNC: 6.7 G/DL (ref 6–8.5)
PROT UR QL STRIP: NEGATIVE
RBC # BLD AUTO: 5.01 X10E6/UL (ref 3.77–5.28)
RBC #/AREA URNS HPF: NORMAL /HPF
SODIUM SERPL-SCNC: 142 MMOL/L (ref 134–144)
SP GR UR: 1.02 (ref 1–1.03)
TRIGL SERPL-MCNC: 117 MG/DL (ref 0–149)
TSH SERPL DL<=0.005 MIU/L-ACNC: 0.36 UIU/ML (ref 0.45–4.5)
URINALYSIS REFLEX, 377202: ABNORMAL
UROBILINOGEN UR STRIP-MCNC: 0.2 MG/DL (ref 0.2–1)
VLDLC SERPL CALC-MCNC: 23 MG/DL (ref 5–40)
WBC # BLD AUTO: 3.8 X10E3/UL (ref 3.4–10.8)
WBC #/AREA URNS HPF: NORMAL /HPF

## 2018-04-01 DIAGNOSIS — E55.9 VITAMIN D DEFICIENCY: Primary | ICD-10-CM

## 2018-04-01 DIAGNOSIS — E03.9 ACQUIRED HYPOTHYROIDISM: ICD-10-CM

## 2018-04-01 RX ORDER — LEVOTHYROXINE SODIUM 50 UG/1
50 TABLET ORAL
Qty: 30 TAB | Refills: 3 | Status: SHIPPED | OUTPATIENT
Start: 2018-04-01 | End: 2018-04-02 | Stop reason: DRUGHIGH

## 2018-04-02 ENCOUNTER — OFFICE VISIT (OUTPATIENT)
Dept: INTERNAL MEDICINE CLINIC | Age: 73
End: 2018-04-02

## 2018-04-02 VITALS
HEART RATE: 76 BPM | OXYGEN SATURATION: 95 % | RESPIRATION RATE: 14 BRPM | WEIGHT: 146.8 LBS | BODY MASS INDEX: 25.06 KG/M2 | TEMPERATURE: 98 F | SYSTOLIC BLOOD PRESSURE: 120 MMHG | HEIGHT: 64 IN | DIASTOLIC BLOOD PRESSURE: 72 MMHG

## 2018-04-02 DIAGNOSIS — E03.9 ACQUIRED HYPOTHYROIDISM: ICD-10-CM

## 2018-04-02 DIAGNOSIS — E55.9 VITAMIN D DEFICIENCY: ICD-10-CM

## 2018-04-02 DIAGNOSIS — F34.1 DYSTHYMIA: Primary | ICD-10-CM

## 2018-04-02 RX ORDER — LEVOTHYROXINE SODIUM 75 UG/1
TABLET ORAL
COMMUNITY
Start: 2018-01-15 | End: 2018-04-02 | Stop reason: DRUGHIGH

## 2018-04-02 RX ORDER — LEVOTHYROXINE SODIUM 75 UG/1
75 TABLET ORAL
Qty: 90 TAB | Refills: 3 | Status: SHIPPED | OUTPATIENT
Start: 2018-04-02 | End: 2019-03-08 | Stop reason: SDUPTHER

## 2018-04-02 NOTE — PROGRESS NOTES
Chief Complaint   Patient presents with    Medication Evaluation     1. Have you been to the ER, urgent care clinic since your last visit? Hospitalized since your last visit? No    2. Have you seen or consulted any other health care providers outside of the [address] [place] [address] since your last visit? Include any pap smears or colon screening.  No

## 2018-04-02 NOTE — MR AVS SNAPSHOT
[address] [place] [address], [address] [place] [address] 
[phone] Patient: John Moreno MRN: [id number] :1945 Visit Information Date & Time Provider Department Dept. Phone Encounter #  
 2018  3:45 PM [facility] [phone] [id number] Follow-up Instructions Return in about 8 weeks (around 2018). Upcoming Health Maintenance Date Due  
 GLAUCOMA SCREENING Q2Y 2014 Pneumococcal 65+ Low/Medium Risk (2 of 2 - PCV13) 2014 MEDICARE YEARLY EXAM 2018 COLONOSCOPY 2019 BREAST CANCER SCRN MAMMOGRAM 2020 DTaP/Tdap/Td series (2 - Td) 2026 Allergies as of 2018  Review Complete On: 2018 By: Lady Grewal MD  
  
 Severity Noted Reaction Type Reactions Talwin [Pentazocine Lactate] Medium 2012   Side Effect Unknown (comments) Altered sensorium Amoxicillin  2013    Other (comments) Diarrhea, stomach upset Darvon [Propoxyphene]  2012    Unknown (comments) Diamox  2012    Other (comments)  
 migraine Morphine  2011    Other (comments) Pt states she is very sensitive to all narcotics, feels like she will pass out Simvastatin  2013    Other (comments) Zithromax [Azithromycin]  2014    Other (comments) depression Current Immunizations  Reviewed on 2017 Name Date Influenza High Dose Vaccine PF 10/27/2016, 2015 Influenza Vaccine 2017, 10/22/2014, 10/25/2013 Influenza Vaccine Split 9/10/2012 Pneumococcal Polysaccharide (PPSV-23) 2013  1:05 PM  
 TD Vaccine 2006 Tdap 2016 Not reviewed this visit Vitals BP Pulse Temp Resp Height(growth percentile) Weight(growth percentile) 120/72 (BP 1 Location: Left arm, BP Patient Position: Sitting) 76 98 °F (36.7 °C) (Oral) 14 5' 4\" (1.626 m) 146 lb 12.8 oz (66.6 kg) SpO2 BMI OB Status Smoking Status 95% 25.2 kg/m2 Postmenopausal Former Smoker Vitals History BMI and BSA Data Body Mass Index Body Surface Area  
 25.2 kg/m 2 1.73 m 2 Preferred Pharmacy Pharmacy Name Phone [address], [address] [phone] Your Updated Medication List  
  
   
This list is accurate as of 4/2/18  4:51 PM.  Always use your most recent med list.  
  
  
  
  
 ammonium lactate 12 % topical cream  
Commonly known as:  AMLACTIN  
RUB IN TO AFFECTED AREA WELL. escitalopram oxalate 5 mg tablet Commonly known as:  Micth Carolus Take 1 Tab by mouth daily. levothyroxine 75 mcg tablet Commonly known as:  SYNTHROID Take 1 Tab by mouth Daily (before breakfast). Prescriptions Sent to Pharmacy Refills  
 levothyroxine (SYNTHROID) 75 mcg tablet 3 Sig: Take 1 Tab by mouth Daily (before breakfast). Class: Normal  
 Pharmacy: [address], [address] [facility] #: [phone] Route: Oral  
  
Follow-up Instructions Return in about 8 weeks (around 5/28/2018). Patient Instructions Vitamin D3 2000 International units once a day after 2 weeks of taking 4000 International units for 2 weeks If no improvement after 4 weeks, increase lexapro 5 mg to 10 mg. Lab Results Component Value Date/Time TSH 0.358 (L) 03/30/2018 08:50 AM  
  
 
 
 
 
  
Introducing [facility]! [address] introduces ChoiceMap patient portal. Now you can access parts of your medical record, email your doctor's office, and request medication refills online. 1. In your internet browser, go to [email or web address]OLt 2. Click on the First Time User? Click Here link in the Sign In box. You will see the New Member Sign Up page. 3. Enter your ChoiceMap Access Code exactly as it appears below.  You will not need to use this code after youve completed the sign-up process. If you do not sign up before the expiration date, you must request a new code. · [employer]ify Access Code: [email or web address] Expires: 7/1/2018  4:47 PM 
 
4. Enter the last four digits of your Social Security Number (xxxx) and Date of Birth (mm/dd/yyyy) as indicated and click Submit. You will be taken to the next sign-up page. 5. Create a groopify ID. This will be your groopify login ID and cannot be changed, so think of one that is secure and easy to remember. 6. Create a [employer]ify password. You can change your password at any time. 7. Enter your Password Reset Question and Answer. This can be used at a later time if you forget your password. 8. Enter your e-mail address. You will receive e-mail notification when new information is available in [address]. 9. Click Sign Up. You can now view and download portions of your medical record. 10. Click the Download Summary menu link to download a portable copy of your medical information. If you have questions, please visit the Frequently Asked Questions section of the groopify website. Remember, groopify is NOT to be used for urgent needs. For medical emergencies, dial 911. Now available from your iPhone and Android! Please provide this summary of care documentation to your next provider. Your primary care clinician is listed as Lady Grewal. If you have any questions after today's visit, please call [phone].

## 2018-04-02 NOTE — PROGRESS NOTES
Medication Evaluation       HPI:  Alena Sanchez is a 67y.o. year old female who is here for a follow up visit. She was last seen by me on 3/30/2018. She reports the following:    Feeling tired and depressed. Nap 1.5 hrs. Gave up sugar for Vera Grate. No constipation. No palpitations. .  Sleeps 7-8 hrs and wake up tired. Low initiative. Low vitamin D    Not exercising. Reviewed TSH records. Last year her TSH was high on 75 mcg and she was increased to 88 mcg. I saw her and her TSH was very low. No symptoms. Reduced back to 75 mcg. Recent TSH shows still low but much better. Reviewed symptoms of over active thyroid. She just has fatigue and low initiative. Assessment and Plan        1. Dysthymia  Suggest increasing vitamin D and then increase lexapro to 10 mg from 5 mg. Get outside more with walking. Doing well with social contacts and being very active with others. Just tired and doesn't want to get out of bed. Discussed wellbutrin and how it may help. She just also needs to exercise too to fight the fatigue. 2. Acquired hypothyroidism  For now, stay on the 75 mcg as that was her dose before. She says she was on 50 mcg and felt horrible with sx of low thyroid.      3. Vitamin D deficiency  Start 4000 International units once a day for 2 weeks and then reduce to 2000 International units               Wt Readings from Last 3 Encounters:   04/02/18 146 lb 12.8 oz (66.6 kg)   01/25/18 147 lb (66.7 kg)   01/18/18 147 lb 12.8 oz (67 kg)          Visit Vitals    /72 (BP 1 Location: Left arm, BP Patient Position: Sitting)    Pulse 76    Temp 98 °F (36.7 °C) (Oral)    Resp 14    Ht 5' 4\" (1.626 m)    Wt 146 lb 12.8 oz (66.6 kg)    SpO2 95%    BMI 25.2 kg/m2       Historical Data    Past Medical History:   Diagnosis Date    Depression     Headache(784.0)     migraines    Hypercholesterolemia     Hypothyroid     Nephrolithiasis     Scoliosis        Past Surgical History:   Procedure Laterality Date    ENDOSCOPY, COLON, DIAGNOSTIC  2009    neg. ([facility])-f/u 5 yrs.  HX DILATION AND CURETTAGE  1986    benign    HX HEENT  childhood    T&A       Outpatient Encounter Prescriptions as of 4/2/2018   Medication Sig Dispense Refill    levothyroxine (SYNTHROID) 75 mcg tablet Take 1 Tab by mouth Daily (before breakfast). 90 Tab 3    ammonium lactate (AMLACTIN) 12 % topical cream RUB IN TO AFFECTED AREA WELL. 280 g 0    escitalopram oxalate (LEXAPRO) 5 mg tablet Take 1 Tab by mouth daily. 90 Tab 3    [DISCONTINUED] levothyroxine (SYNTHROID) 75 mcg tablet       [DISCONTINUED] levothyroxine (SYNTHROID) 50 mcg tablet Take 1 Tab by mouth Daily (before breakfast). 30 Tab 3     No facility-administered encounter medications on file as of 4/2/2018. Allergies   Allergen Reactions    Talwin [Pentazocine Lactate] Unknown (comments)     Altered sensorium    Amoxicillin Other (comments)     Diarrhea, stomach upset    Darvon [Propoxyphene] Unknown (comments)    Diamox Other (comments)     migraine    Morphine Other (comments)     Pt states she is very sensitive to all narcotics, feels like she will pass out    Simvastatin Other (comments)    Zithromax [Azithromycin] Other (comments)     depression        Social History     Social History    Marital status:      Spouse name: N/A    Number of children: N/A    Years of education: N/A     Occupational History    Not on file.      Social History Main Topics    Smoking status: Former Smoker     Years: 15.00    Smokeless tobacco: Never Used    Alcohol use 1.2 - 1.8 oz/week     0 - 1 Standard drinks or equivalent, 2 Glasses of wine per week      Comment: occasional    Drug use: No    Sexual activity: Yes     Partners: Male     Other Topics Concern    Not on file     Social History Narrative        family history includes Arrhythmia in her mother; Breast Cancer (age of onset: 61) in her sister; Coronary Artery Disease (age of onset: 61) in her father; Diabetes in her father; Heart Disease (age of onset: 61) in her mother; Hypertension in her mother. Review of Systems   Constitutional: Negative for weight loss. Eyes: Negative for blurred vision. Respiratory: Negative for shortness of breath. Cardiovascular: Negative for chest pain. Gastrointestinal: Negative for abdominal pain. Genitourinary: Negative for dysuria and frequency. Skin: Negative for rash. Neurological: Negative for dizziness, focal weakness, weakness and headaches. Endo/Heme/Allergies: Negative for environmental allergies. Does not bruise/bleed easily. Physical Exam   Constitutional: She appears well-developed and well-nourished. She is active. Non-toxic appearance. She does not have a sickly appearance. She does not appear ill. No distress. Eyes: Conjunctivae are normal. Right eye exhibits no discharge. Cardiovascular: Normal rate, regular rhythm, S1 normal, S2 normal, normal heart sounds and normal pulses. Exam reveals no gallop and no friction rub. Pulmonary/Chest: Effort normal and breath sounds normal. No respiratory distress. Abdominal: Soft. Bowel sounds are normal.   Musculoskeletal: She exhibits no edema or deformity. Neurological: She is alert. Skin: Skin is warm and dry. No rash noted. No pallor. Psychiatric: She has a normal mood and affect. Her behavior is normal.   Vitals reviewed. Ortho Exam      Orders Placed This Encounter    DISCONTD: levothyroxine (SYNTHROID) 75 mcg tablet    levothyroxine (SYNTHROID) 75 mcg tablet     Sig: Take 1 Tab by mouth Daily (before breakfast). Dispense:  90 Tab     Refill:  3        I have reviewed the patient's medical history in detail and updated the computerized patient record. We had a prolonged discussion about these complex clinical issues and went over the various important aspects to consider. All questions were answered.      Advised her to call back or return to office if symptoms do not improve, change in nature, or persist.    She was given an after visit summary or informed of [employer] Access which includes patient instructions, diagnoses, current medications, & vitals. She expressed understanding with the diagnosis and plan.

## 2018-04-02 NOTE — PATIENT INSTRUCTIONS
Vitamin D3 2000 International units once a day after 2 weeks of taking 4000 International units for 2 weeks    If no improvement after 4 weeks, increase lexapro 5 mg to 10 mg.       Lab Results   Component Value Date/Time    TSH 0.358 (L) 03/30/2018 08:50 AM

## 2018-04-02 NOTE — PROGRESS NOTES
Her cholesterol is still extremely high. She needs to get her thyroid reduced more from 75 mcg to 50 mcg. Will call it in and then get TSH rechecked again in 8 weeks. Vit D is still on low side. Ask if she is taking any?

## 2018-04-03 NOTE — PROGRESS NOTES
Patient stated she had an appointment with you yesterday and labs were discussed.  She asked me to mail her a copy of her labs and she would like a recommendation for a endocrinologist.

## 2018-04-11 NOTE — PROGRESS NOTES
Patient was given the name and phone number of Dr Maura Olvera [phone]. Patient states she feels better since she started vitamin d.

## 2018-08-17 ENCOUNTER — TELEPHONE (OUTPATIENT)
Dept: INTERNAL MEDICINE CLINIC | Age: 73
End: 2018-08-17

## 2018-08-17 RX ORDER — ESCITALOPRAM OXALATE 5 MG/1
5 TABLET ORAL DAILY
Qty: 90 TAB | Refills: 3 | Status: SHIPPED | OUTPATIENT
Start: 2018-08-17 | End: 2019-03-08 | Stop reason: SDUPTHER

## 2018-10-29 DIAGNOSIS — L85.3 XEROSIS OF SKIN: ICD-10-CM

## 2018-10-29 RX ORDER — AMMONIUM LACTATE 12 G/100G
CREAM TOPICAL
Qty: 280 G | Refills: 0 | Status: SHIPPED | OUTPATIENT
Start: 2018-10-29 | End: 2019-02-21 | Stop reason: SDUPTHER

## 2019-01-11 ENCOUNTER — HOSPITAL ENCOUNTER (OUTPATIENT)
Dept: PREADMISSION TESTING | Age: 74
Discharge: HOME | End: 2019-01-11
Payer: MEDICARE

## 2019-01-11 VITALS
BODY MASS INDEX: 24.18 KG/M2 | SYSTOLIC BLOOD PRESSURE: 150 MMHG | WEIGHT: 145.13 LBS | TEMPERATURE: 98 F | DIASTOLIC BLOOD PRESSURE: 83 MMHG | RESPIRATION RATE: 20 BRPM | HEIGHT: 65 IN | HEART RATE: 68 BPM

## 2019-01-11 LAB
ATRIAL RATE: 59 BPM
CALCULATED P AXIS, ECG09: 62 DEGREES
CALCULATED R AXIS, ECG10: 27 DEGREES
CALCULATED T AXIS, ECG11: 45 DEGREES
DIAGNOSIS, 93000: NORMAL
P-R INTERVAL, ECG05: 160 MS
Q-T INTERVAL, ECG07: 452 MS
QRS DURATION, ECG06: 76 MS
QTC CALCULATION (BEZET), ECG08: 447 MS
VENTRICULAR RATE, ECG03: 59 BPM

## 2019-01-11 PROCEDURE — 93005 ELECTROCARDIOGRAM TRACING: CPT

## 2019-01-11 RX ORDER — LORATADINE 10 MG/1
10 TABLET ORAL DAILY
COMMUNITY
End: 2019-09-13

## 2019-01-16 NOTE — H&P
Subjective:  
Patient ID: Gayla Cheng is a 68 y.o. female. Pain Score: 1 Chief Complaint: Follow-up of the Right Knee HPI: Gayla Cheng is a 68 y.o. female who returns for follow-up of pain in her right knee. Last visit we talked about a right knee ACL hamstring allograft and planned to have surgery sometime in January. She reports that her right knee feels better at the moment. She says that her right knee feels stronger and is thinking of reconsidering her right ACL repair. She has been walking, but has not been doing as much exercise as she was doing before. She says that when she a deep knee bend she feels her right knee buckle when she reaches full knee extension. She stops exercising outside and is just walking in the pool now. He she does daily activity she is very cautious about how she steps because of concern about it giving out on her. Past Medical History:  
Diagnosis Date  Hyperlipidemia Past Surgical History:  
Procedure Laterality Date  NO RELEVANT ORTHOPAEDIC SURGERIES  
 NO RELEVANT SURGERIES Family History Problem Relation Age of Onset  Coronary artery disease Mother  Diabetes Father  Coronary artery disease Father Social History Socioeconomic History  Marital status:  Spouse name: None  Number of children: None  Years of education: None  Highest education level: None Social Needs  Financial resource strain: None  Food insecurity - worry: None  Food insecurity - inability: None  Transportation needs - medical: None  Transportation needs - non-medical: None Occupational History  None Tobacco Use  Smoking status: Never Smoker  Smokeless tobacco: Never Used Substance and Sexual Activity  Alcohol use: Yes  Drug use: No  
 Sexual activity: None Other Topics Concern  None Social History Narrative  None Review of Systems 12/17/2018 Constitutional: Unexplained: Negative Genitourinary: Frequent Urination: Negative HEENT: Vision Loss: Positive Neurological: Memory Loss: Negative Integumentary: Rash: Negative Cardiovascular: Palpatations: Negative Hematologic: Bruises/Bleeds Easily: Negative Gastrointestinal: Constipation: Negative Immunological: Seasonal Allergies: Negative Musculoskeletal: Joint Pain: Negative Objective:  
 
Vitals:  
12/17/18 1339 Weight: 140 lb Height: 5' 3.5\" Constitutional:  No acute distress. Well nourished. Well developed. Psychiatric: Alert and oriented x3. Skin:  No marked skin ulcers. Neurological:  No apparent deficits Patient Active Problem List  
 Diagnosis Date Noted  Family history of breast cancer in first degree relative 07/19/2016  Migraine headache 12/02/2015  Osteopenia 08/19/2014  Adjustment disorder 08/19/2014  Environmental allergies 12/04/2013  Xerosis of skin 12/04/2013  Vitamin D deficiency 12/04/2013  Hypercholesteremia 09/18/2013  History of colonoscopy 09/18/2013  Acquired hypothyroidism 06/07/2012 Past Medical History:  
Diagnosis Date  Adverse effect of anesthesia SLOW WAKING PAST ANESTHESIA, SEVERE HEADACHE  Depression  Headache(784.0)   
 migraines  Hypercholesterolemia  Hypothyroid  Nephrolithiasis  Scoliosis Past Surgical History:  
Procedure Laterality Date  ENDOSCOPY, COLON, DIAGNOSTIC  2009  
 neg. ([facility])-f/u 5 yrs.  HX DILATION AND CURETTAGE  1986  
 benign  HX GI    
 COLONOSCOPY  
 HX TONSILLECTOMY  childhood T&A Prior to Admission medications Medication Sig Start Date End Date Taking? Authorizing Provider  
loratadine (CLARITIN) 10 mg tablet Take 10 mg by mouth daily. Yes Provider, Historical  
ammonium lactate (AMLACTIN) 12 % topical cream RUB IN TO AFFECTED AREA WELL. Patient taking differently: RUB IN TO AFFECTED AREA WELL.  AS NEEDED 10/29/18  Yes Lian Boyce MD  
 escitalopram oxalate (LEXAPRO) 5 mg tablet Take 1 Tab by mouth daily. 18  Yes Ziggy Ruiz NP  
levothyroxine (SYNTHROID) 75 mcg tablet Take 1 Tab by mouth Daily (before breakfast). 18  Yes Chhaya Santos MD  
 
Allergies Allergen Reactions  Talwin [Pentazocine Lactate] Unknown (comments) Altered sensorium  Amoxicillin Other (comments) Diarrhea, stomach upset  Darvon [Propoxyphene] Unknown (comments)  Diamox Other (comments)  
  migraine  Morphine Other (comments) Pt states she is very sensitive to all narcotics, feels like she will pass out  Simvastatin Other (comments)  Zithromax [Azithromycin] Other (comments) depression Social History Tobacco Use  Smoking status: Former Smoker Years: 7.00 Last attempt to quit: 1970 Years since quittin.0  Smokeless tobacco: Never Used  Tobacco comment: SOCIAL SMOKER ON THE WEEKEND Substance Use Topics  Alcohol use: Yes Alcohol/week: 0.0 - 0.6 oz  
  Comment: OCCASIONALLY Family History Problem Relation Age of Onset  Hypertension Mother  Heart Disease Mother 61 CHF/etoh  Arrhythmia Mother  Heart Failure Mother  Diabetes Father  Coronary Artery Disease Father 61 CABG (twice)  Heart Disease Father  Heart Surgery Father  Breast Cancer Sister 61  Cancer Sister BREAST  Heart Disease Brother  Anesth Problems Neg Hx Patient Vitals for the past 8 hrs: 
 BP Temp Pulse Resp SpO2 Height Weight 19 0819 132/73 98.1 °F (36.7 °C) 65 19 97 % 5' 5\" (1.651 m) 65.8 kg (145 lb) General appearance: alert, cooperative, no distress, appears stated age Head: Normocephalic, without obvious abnormality, atraumatic Lungs: clear to auscultation bilaterally Heart: regular rate and rhythm, S1, S2 normal, no murmur Abdomen: soft, non-tender. Bowel sounds normal. No masses,  no organomegaly Extremities: Exam of the right knee. No skin changes, rashes, erythema, deformity, or bruising. No effusion Positive Lachman's and anterior drawer test, negative posterior drawer. Stable to varus and valgus stress testing. No effusion or crepitus . Good stability of the patella. Good tracking of patella. Good pulses good sensation good cap refill and no edema distally. Left knee exam has normal alignment and range of motion with no pain. There is good stability in all planes and no crepitance and no effusion. The Lachman's and drawer are negative. The knee is stable to varus and valgus stress testing. The patella tracks well. There is no joint line tenderness. The leg is neurovascular intact distally with no skin changes rashes erythema deformity or bruising about the leg. There is no edema and good pulses distally. Pulses: 2+ and symmetric Neurologic: Grossly normal 
 
 
 
 
Skin healthy and intact. She is neurovascularly intact. Radiographs:   
 
 
No imaging obtained Assessment: 1. Rupture of anterior cruciate ligament of right knee, subsequent encounter 2. Acute pain of right knee Patient Active Problem List  
Diagnosis  Acquired hypothyroidism  Hypercholesteremia  Migraine headache Plan:  
 
Discussed treatment options with the patient. I told her that her normal daily activity is being limited by her right knee and that a right knee ACL repair will greatly increase her quality of living. She will have restrictions on her physical activity the rest of her life if she does not have her right knee ACL repair. We discussed the risks of surgical treatment of Anterior Cruciate Ligament injury as well as the risks of nonsurgical treatment. We talked about the likelihood of arthritis over time in the knee with a deficient Anterior Cruciate Ligament.  Talked about the procedure of the case including the arthroscopic assisted nature of the procedure and the minimal invasive technique. Talked about graft options including hamstrings and patellar tendon. We talked about an ACL transplant from a cadaver. We discussed that hamstring was an excellent options in certain patients and decided to go with a hamstring allograft. With hamstrings we discussed the affect on the acceleration and the chance that it would affect hamstring strength. We also talked about soft tissue healing to bone. We then went through the entirety of the rehab including what would happen with and without a meniscus repair. We talked about how they would, if it was just a Anterior Cruciate Ligament injury, be able to weightbear immediately and to wean out of the brace and crutches as soon as possible. We have discussed post-op PT. She wishes to do PT here, told her we will schedule it for her today. Talked about the recovery time following surgery. The pt expressed understanding of all this discussion. We answered all of the pt's questions. No orders of the defined types were placed in this encounter. Return for Scheduled surgery. Medical documentation was entered into the chart by me, Trina Lopez, medical scribe for Dr. Louis Martinez. Valerie Flores MD, personally, performed the services described in this documentation, as scribed in my presence, and it is both accurate and complete. Scribed by: Trina Lopez 
 
Patient was seen and examined. There have been no significant clinical changes since the completion of the above History and Physical. 
Patient identified by surgeon; surgical site was confirmed by patient and surgeon.

## 2019-01-17 ENCOUNTER — ANESTHESIA (OUTPATIENT)
Dept: MEDSURG UNIT | Age: 74
End: 2019-01-17
Payer: MEDICARE

## 2019-01-17 ENCOUNTER — ANESTHESIA EVENT (OUTPATIENT)
Dept: MEDSURG UNIT | Age: 74
End: 2019-01-17
Payer: MEDICARE

## 2019-01-17 ENCOUNTER — HOSPITAL ENCOUNTER (OUTPATIENT)
Age: 74
Setting detail: OUTPATIENT SURGERY
Discharge: HOME | End: 2019-01-17
Attending: ORTHOPAEDIC SURGERY | Admitting: ORTHOPAEDIC SURGERY
Payer: MEDICARE

## 2019-01-17 VITALS
DIASTOLIC BLOOD PRESSURE: 72 MMHG | WEIGHT: 145 LBS | HEART RATE: 66 BPM | TEMPERATURE: 98.1 F | HEIGHT: 65 IN | RESPIRATION RATE: 19 BRPM | SYSTOLIC BLOOD PRESSURE: 128 MMHG | OXYGEN SATURATION: 96 % | BODY MASS INDEX: 24.16 KG/M2

## 2019-01-17 PROCEDURE — 77030006884 HC BLD SHV INCIS S&N -B: Performed by: ORTHOPAEDIC SURGERY

## 2019-01-17 PROCEDURE — 77030010509 HC AIRWY LMA MSK TELE -A: Performed by: ANESTHESIOLOGY

## 2019-01-17 PROCEDURE — 77030002916 HC SUT ETHLN J&J -A: Performed by: ORTHOPAEDIC SURGERY

## 2019-01-17 PROCEDURE — 76210000039 HC AMBSU PH I REC 3.5 TO 4 HR: Performed by: ORTHOPAEDIC SURGERY

## 2019-01-17 PROCEDURE — 74011000250 HC RX REV CODE- 250: Performed by: ORTHOPAEDIC SURGERY

## 2019-01-17 PROCEDURE — 74011000272 HC RX REV CODE- 272: Performed by: ORTHOPAEDIC SURGERY

## 2019-01-17 PROCEDURE — 77030028224 HC PDNG CST BSNM -A: Performed by: ORTHOPAEDIC SURGERY

## 2019-01-17 PROCEDURE — 77030032490 HC SLV COMPR SCD KNE COVD -B: Performed by: ORTHOPAEDIC SURGERY

## 2019-01-17 PROCEDURE — 77030004451 HC BUR SHV S&N -B: Performed by: ORTHOPAEDIC SURGERY

## 2019-01-17 PROCEDURE — 77030013079 HC BLNKT BAIR HGGR 3M -A: Performed by: ANESTHESIOLOGY

## 2019-01-17 PROCEDURE — C1762 CONN TISS, HUMAN(INC FASCIA): HCPCS | Performed by: ORTHOPAEDIC SURGERY

## 2019-01-17 PROCEDURE — 77030037795 HC GRFT TISS FLXGRFT ALLGRFT FZ LIFV -H: Performed by: ORTHOPAEDIC SURGERY

## 2019-01-17 PROCEDURE — 97162 PT EVAL MOD COMPLEX 30 MIN: CPT

## 2019-01-17 PROCEDURE — 77030003862 HC BIT DRL SYNT -B: Performed by: ORTHOPAEDIC SURGERY

## 2019-01-17 PROCEDURE — 76030000020 HC AMB SURG 1.5 TO 2 HR INTENSV-TIER 1: Performed by: ORTHOPAEDIC SURGERY

## 2019-01-17 PROCEDURE — 77030031139 HC SUT VCRL2 J&J -A: Performed by: ORTHOPAEDIC SURGERY

## 2019-01-17 PROCEDURE — 76060000063 HC AMB SURG ANES 1.5 TO 2 HR: Performed by: ORTHOPAEDIC SURGERY

## 2019-01-17 PROCEDURE — 77030020753 HC CUF TRNQT 1BLA STRY -B: Performed by: ORTHOPAEDIC SURGERY

## 2019-01-17 PROCEDURE — 77030013789 HC KT REP BIO TEND ARTH -C: Performed by: ORTHOPAEDIC SURGERY

## 2019-01-17 PROCEDURE — 77030020268 HC MISC GENERAL SUPPLY: Performed by: ORTHOPAEDIC SURGERY

## 2019-01-17 PROCEDURE — 76210000057 HC AMBSU PH II REC 1 TO 1.5 HR: Performed by: ORTHOPAEDIC SURGERY

## 2019-01-17 PROCEDURE — 74011250636 HC RX REV CODE- 250/636

## 2019-01-17 PROCEDURE — 77030008753 HC TU IRR IN/OUT FLO S&N -B: Performed by: ORTHOPAEDIC SURGERY

## 2019-01-17 PROCEDURE — 77030002933 HC SUT MCRYL J&J -A: Performed by: ORTHOPAEDIC SURGERY

## 2019-01-17 PROCEDURE — L1830 KO IMMOB CANVAS LONG PRE OTS: HCPCS | Performed by: ORTHOPAEDIC SURGERY

## 2019-01-17 PROCEDURE — 77030003601 HC NDL NRV BLK BBMI -A

## 2019-01-17 PROCEDURE — 97116 GAIT TRAINING THERAPY: CPT

## 2019-01-17 PROCEDURE — 85018 HEMOGLOBIN: CPT

## 2019-01-17 PROCEDURE — 74011250637 HC RX REV CODE- 250/637: Performed by: ANESTHESIOLOGY

## 2019-01-17 PROCEDURE — 77030006831 HC BLD SAW SAG MCRA -B: Performed by: ORTHOPAEDIC SURGERY

## 2019-01-17 PROCEDURE — 77030020782 HC GWN BAIR PAWS FLX 3M -B

## 2019-01-17 PROCEDURE — 77030018933 HC KT TRNSTIB ACL4 ARTH -C: Performed by: ORTHOPAEDIC SURGERY

## 2019-01-17 PROCEDURE — 77030013446 HC CLD THER UNIT DJOR -B: Performed by: ORTHOPAEDIC SURGERY

## 2019-01-17 PROCEDURE — 74011250636 HC RX REV CODE- 250/636: Performed by: PHYSICIAN ASSISTANT

## 2019-01-17 PROCEDURE — 77030034499 HC CUTR BN ENDOSC FLIPCUTR ARTH -F: Performed by: ORTHOPAEDIC SURGERY

## 2019-01-17 PROCEDURE — 74011000250 HC RX REV CODE- 250

## 2019-01-17 PROCEDURE — 74011250636 HC RX REV CODE- 250/636: Performed by: ANESTHESIOLOGY

## 2019-01-17 DEVICE — GRAFT HUM TISS L60-80MM DIA7.5-10.5MM FRZN FLEXIGRFT: Type: IMPLANTABLE DEVICE | Site: KNEE | Status: FUNCTIONAL

## 2019-01-17 RX ORDER — OXYCODONE HYDROCHLORIDE 5 MG/1
5 TABLET ORAL ONCE
Status: COMPLETED | OUTPATIENT
Start: 2019-01-17 | End: 2019-01-17

## 2019-01-17 RX ORDER — PHENYLEPHRINE HCL IN 0.9% NACL 0.4MG/10ML
SYRINGE (ML) INTRAVENOUS AS NEEDED
Status: DISCONTINUED | OUTPATIENT
Start: 2019-01-17 | End: 2019-01-17 | Stop reason: HOSPADM

## 2019-01-17 RX ORDER — KETOROLAC TROMETHAMINE 30 MG/ML
15 INJECTION, SOLUTION INTRAMUSCULAR; INTRAVENOUS
Status: COMPLETED | OUTPATIENT
Start: 2019-01-17 | End: 2019-01-17

## 2019-01-17 RX ORDER — FENTANYL CITRATE 50 UG/ML
50 INJECTION, SOLUTION INTRAMUSCULAR; INTRAVENOUS ONCE
Status: COMPLETED | OUTPATIENT
Start: 2019-01-17 | End: 2019-01-17

## 2019-01-17 RX ORDER — LIDOCAINE HYDROCHLORIDE 20 MG/ML
INJECTION, SOLUTION EPIDURAL; INFILTRATION; INTRACAUDAL; PERINEURAL AS NEEDED
Status: DISCONTINUED | OUTPATIENT
Start: 2019-01-17 | End: 2019-01-17 | Stop reason: HOSPADM

## 2019-01-17 RX ORDER — ROPIVACAINE HYDROCHLORIDE 5 MG/ML
INJECTION, SOLUTION EPIDURAL; INFILTRATION; PERINEURAL
Status: COMPLETED | OUTPATIENT
Start: 2019-01-17 | End: 2019-01-17

## 2019-01-17 RX ORDER — GLYCOPYRROLATE 0.2 MG/ML
INJECTION INTRAMUSCULAR; INTRAVENOUS AS NEEDED
Status: DISCONTINUED | OUTPATIENT
Start: 2019-01-17 | End: 2019-01-17 | Stop reason: HOSPADM

## 2019-01-17 RX ORDER — PROPOFOL 10 MG/ML
INJECTION, EMULSION INTRAVENOUS AS NEEDED
Status: DISCONTINUED | OUTPATIENT
Start: 2019-01-17 | End: 2019-01-17 | Stop reason: HOSPADM

## 2019-01-17 RX ORDER — ONDANSETRON 2 MG/ML
INJECTION INTRAMUSCULAR; INTRAVENOUS AS NEEDED
Status: DISCONTINUED | OUTPATIENT
Start: 2019-01-17 | End: 2019-01-17 | Stop reason: HOSPADM

## 2019-01-17 RX ORDER — ACETAMINOPHEN 10 MG/ML
1000 INJECTION, SOLUTION INTRAVENOUS ONCE
Status: COMPLETED | OUTPATIENT
Start: 2019-01-17 | End: 2019-01-17

## 2019-01-17 RX ORDER — MIDAZOLAM HYDROCHLORIDE 1 MG/ML
3 INJECTION, SOLUTION INTRAMUSCULAR; INTRAVENOUS ONCE
Status: COMPLETED | OUTPATIENT
Start: 2019-01-17 | End: 2019-01-17

## 2019-01-17 RX ORDER — DEXAMETHASONE SODIUM PHOSPHATE 4 MG/ML
INJECTION, SOLUTION INTRA-ARTICULAR; INTRALESIONAL; INTRAMUSCULAR; INTRAVENOUS; SOFT TISSUE AS NEEDED
Status: DISCONTINUED | OUTPATIENT
Start: 2019-01-17 | End: 2019-01-17 | Stop reason: HOSPADM

## 2019-01-17 RX ORDER — SODIUM CHLORIDE, SODIUM LACTATE, POTASSIUM CHLORIDE, CALCIUM CHLORIDE 600; 310; 30; 20 MG/100ML; MG/100ML; MG/100ML; MG/100ML
INJECTION, SOLUTION INTRAVENOUS
Status: DISCONTINUED | OUTPATIENT
Start: 2019-01-17 | End: 2019-01-17 | Stop reason: HOSPADM

## 2019-01-17 RX ORDER — SODIUM CHLORIDE, SODIUM LACTATE, POTASSIUM CHLORIDE, AND CALCIUM CHLORIDE .6; .31; .03; .02 G/100ML; G/100ML; G/100ML; G/100ML
IRRIGANT IRRIGATION AS NEEDED
Status: DISCONTINUED | OUTPATIENT
Start: 2019-01-17 | End: 2019-01-17 | Stop reason: HOSPADM

## 2019-01-17 RX ORDER — EPHEDRINE SULFATE 50 MG/ML
INJECTION, SOLUTION INTRAVENOUS AS NEEDED
Status: DISCONTINUED | OUTPATIENT
Start: 2019-01-17 | End: 2019-01-17 | Stop reason: HOSPADM

## 2019-01-17 RX ADMIN — FENTANYL CITRATE 50 MCG: 50 INJECTION, SOLUTION INTRAMUSCULAR; INTRAVENOUS at 08:32

## 2019-01-17 RX ADMIN — OXYCODONE HYDROCHLORIDE 5 MG: 5 TABLET ORAL at 15:29

## 2019-01-17 RX ADMIN — LIDOCAINE HYDROCHLORIDE 40 MG: 20 INJECTION, SOLUTION EPIDURAL; INFILTRATION; INTRACAUDAL; PERINEURAL at 09:39

## 2019-01-17 RX ADMIN — DEXAMETHASONE SODIUM PHOSPHATE 4 MG: 4 INJECTION, SOLUTION INTRA-ARTICULAR; INTRALESIONAL; INTRAMUSCULAR; INTRAVENOUS; SOFT TISSUE at 10:03

## 2019-01-17 RX ADMIN — Medication 80 MCG: at 10:21

## 2019-01-17 RX ADMIN — Medication 80 MCG: at 09:47

## 2019-01-17 RX ADMIN — ONDANSETRON 4 MG: 2 INJECTION INTRAMUSCULAR; INTRAVENOUS at 10:03

## 2019-01-17 RX ADMIN — KETOROLAC TROMETHAMINE 15 MG: 30 INJECTION INTRAMUSCULAR; INTRAVENOUS at 11:51

## 2019-01-17 RX ADMIN — EPHEDRINE SULFATE 5 MG: 50 INJECTION, SOLUTION INTRAVENOUS at 10:06

## 2019-01-17 RX ADMIN — PROPOFOL 20 MG: 10 INJECTION, EMULSION INTRAVENOUS at 09:39

## 2019-01-17 RX ADMIN — ROPIVACAINE HYDROCHLORIDE 10 ML: 5 INJECTION, SOLUTION EPIDURAL; INFILTRATION; PERINEURAL at 08:30

## 2019-01-17 RX ADMIN — PROPOFOL 120 MG: 10 INJECTION, EMULSION INTRAVENOUS at 09:44

## 2019-01-17 RX ADMIN — VANCOMYCIN HYDROCHLORIDE 1000 MG: 1 INJECTION, POWDER, LYOPHILIZED, FOR SOLUTION INTRAVENOUS at 08:29

## 2019-01-17 RX ADMIN — GLYCOPYRROLATE 0.2 MG: 0.2 INJECTION INTRAMUSCULAR; INTRAVENOUS at 10:06

## 2019-01-17 RX ADMIN — MIDAZOLAM HYDROCHLORIDE 3 MG: 1 INJECTION, SOLUTION INTRAMUSCULAR; INTRAVENOUS at 08:32

## 2019-01-17 RX ADMIN — EPHEDRINE SULFATE 5 MG: 50 INJECTION, SOLUTION INTRAVENOUS at 09:56

## 2019-01-17 RX ADMIN — SODIUM CHLORIDE, SODIUM LACTATE, POTASSIUM CHLORIDE, CALCIUM CHLORIDE: 600; 310; 30; 20 INJECTION, SOLUTION INTRAVENOUS at 08:15

## 2019-01-17 RX ADMIN — LIDOCAINE HYDROCHLORIDE 60 MG: 20 INJECTION, SOLUTION EPIDURAL; INFILTRATION; INTRACAUDAL; PERINEURAL at 09:44

## 2019-01-17 RX ADMIN — Medication 40 MCG: at 09:56

## 2019-01-17 RX ADMIN — ROPIVACAINE HYDROCHLORIDE 20 ML: 5 INJECTION, SOLUTION EPIDURAL; INFILTRATION; PERINEURAL at 08:20

## 2019-01-17 RX ADMIN — ACETAMINOPHEN 1000 MG: 10 INJECTION, SOLUTION INTRAVENOUS at 11:50

## 2019-01-17 NOTE — PROGRESS NOTES
physical Therapy EVALUATION 
(Ambulatory surgery, emergency room & recovery room patients) Patient: Krystina Barger (86 y.o. female) Date: 1/17/2019 Primary Diagnosis and Medical History: ACL TEAR Procedure(s) (LRB): 
RIGHT KNEE ARTHROSCOPY WITH ANTERIOR CRUCIATE LIGAMENT RECONSTRUCTION WITH HAMSTRING ALLOGRAFT (Right) Day of Surgery Past Medical History:  
Diagnosis Date  Adverse effect of anesthesia SLOW WAKING PAST ANESTHESIA, SEVERE HEADACHE  Depression  Headache(784.0)   
 migraines  Hypercholesterolemia  Hypothyroid  Nephrolithiasis  Scoliosis Past Surgical History:  
Procedure Laterality Date  ENDOSCOPY, COLON, DIAGNOSTIC  2009  
 neg. ([facility])-f/u 5 yrs.  HX DILATION AND CURETTAGE  1986  
 benign  HX GI    
 COLONOSCOPY  
 HX TONSILLECTOMY  childhood T&A Patient Active Problem List  
Diagnosis Code  Acquired hypothyroidism E03.9  Hypercholesteremia E78.00  
 History of colonoscopy Z98.890  
 Environmental allergies Z91.09  
 Xerosis of skin L85.3  Vitamin D deficiency E55.9  Osteopenia M85.80  Adjustment disorder F43.20  Migraine headache G43.909  Family history of breast cancer in first degree relative Z80.3 Prior Level of Function/Home Situation: indendent Personal factors and/or comorbidities impacting plan of care:  
 
Home Situation Home Environment: Private residence Patient Expects to be Discharged to[de-identified] Private residence Ordered Weight Bearing Status:  right as tolerated in brace locked into extension Equipment: crutches and rollator EXAMINATION/PRESENTATION/DECISION MAKING:  
Critical Behavior: 
Neurologic State: (calm) Transfers: 
Overall level of assistance required following instruction: minimal assistance/contact guard assist given verbal using rollator per pt request (she did not want to use the crutches. Ambulation: 
Weight bearing status during ambulation: amb 25 feet with rollator wearing knee brace locked into extension WBAT Stair Management: 
  
 up and down 4 stairs using one rail and one crutch WBAT in knee brace locked into extension with assist ascending the stairs with right dorsiflex Strength/ROM Limitations: WFL except RLE still with effects of anesthesia, dorsiflex 1/5 Special Tests: 
Timed up and go: > 20 seconds extrapolated < than 10 seconds=Normal  Greater then 13.5 seconds (in elderly)=Increased fall risk Eric SANDHU Predicting the probability for falls in community dwelling older adults using the Timed Up and Go Test. Phys Ther. 2000;80:896-903. Physical Therapy Evaluation Charge Determination History Examination Presentation Decision-Making MEDIUM  Complexity : 1-2 comorbidities / personal factors will impact the outcome/ POC  LOW Complexity : 1-2 Standardized tests and measures addressing body structure, function, activity limitation and / or participation in recreation  LOW Complexity : Stable, uncomplicated  LOW Complexity : FOTO score of  Based on the above components, the patient evaluation is determined to be of the following complexity level: LOW Pain: 
Pain Scale 1: Numeric (0 - 10) Pain Intensity 1: 1 Pain Location 1: Knee, Head Education: 
Role of P.T. explained to the patient:  [x]  Yes              []   No 
 
 
 Topics addressed: Comments:  
[x]                                    Device use and technique [x]                                    Transfer technique [x]                                    Gait training   
[x]                                    Stair training Patient instructed in ankle pumps at least 10 per hour Patient is discharged from physical therapy at this time. Samira Duncan Time Calculation: 36 mins

## 2019-01-17 NOTE — DISCHARGE INSTRUCTIONS
______________________________________________________________________________________[facility]     POST-OPERATIVE INSTRUCTIONS  ACL RECONSTRUCTION  MD Jaycob Roberto PA-C     1. First meal at home should be clear liquids. Progress to regular diet as tolerated. 2. Apply an ice bag or use cold therapy device for 20-30 minutes 4 times a day for the two first week to reduce swelling and pain and then use as desired. 3. A prescription for pain medication - Oxycodone  - pt has meds already. _____________________________________________         Please take 1 Aspirin 325mg twice a day for 10 days. beginning tomorrow. All pain medication can cause constipation. Advise drinking plenty of fluids and taking an OTC stool softener such as Miralax. 4. You may remove the bulky dressing 24 -48 hours after surgery( your therapist often will remove it for you)  Re-apply ace bandage to keep covered. AFTER 48 HOURS IT IS OK TO REMOVE THE ACE BANDAGE AND SHOWER. [place] Dials PLEASE LEAVE THE STERI-STRIPS IN PLACE UNTiL THEY FALL OFF     5. Elevate your surgical leg when sitting or sleeping to reduce swelling. Do not place a pillow directly under the knee - place it under your ankle. It is important to work on getting the knee out all the way straight . 6. Physical therapy will begin the day after surgery and will be scheduled ahead of time by our office. ______________TOMORROW WITH ROLAND AT ov pt _____________________________________________________    7. Crutches will be provided for you if you do not already have them. You may fully weight bear with your brace and crutches. Your therapist will progress you off of the crutches and out of the brace as your quad strength and extension improves. 8. A knee immobilizer will be applied to help with ambulation.   You should continue to use the immobilizer until discontinued by your therapist.    9. A post-op appointment has been scheduled for you. ______________________ __________1/30 AT 1:50PM WITH LIDYA Lee PA-C   10. _________________________________ Please call the office if you need to re-schedule your appointment for another day or time ([phone]). 11. If you develop a fever greater than 101, unexpected redness or swelling in your arm, please call the office . Some bleeding and or drainage can be expected the first few days after surgery. Thank you for following the above instructions. If you have any questions, please call my office and ask to speak to Kathy Queen PA-C or one of my assistants ([phone]). ______________________________________________________________________    DISCHARGE SUMMARY from Nurse     You were given IV Tylenol and IV Toradol (Ibuprofen) at 11:45, please do not take any of these until 5:45. PATIENT INSTRUCTIONS:    After general anesthesia or intravenous sedation, for 24 hours or while taking prescription Narcotics:  · Limit your activities  · Do not drive and operate hazardous machinery  · Do not make important personal or business decisions  · Do  not drink alcoholic beverages  · If you have not urinated within 8 hours after discharge, please contact your surgeon on call. Report the following to your surgeon:  · Excessive pain, swelling, redness or odor of or around the surgical area  · Temperature over 100.5  · Nausea and vomiting lasting longer than 4 hours or if unable to take medications  · Any signs of decreased circulation or nerve impairment to extremity: change in color, persistent  numbness, tingling, coldness or increase pain  · Any questions    What to do at Home:  Recommended activity: Activity as tolerated, and according to Dr Isabelle Coker above instuctions    If you experience any of the above symptoms, please follow up with Dr Davy Toledo. *  Please give a list of your current medications to your Primary Care Provider.     *  Please update this list whenever your medications are discontinued, doses are      changed, or new medications (including over-the-counter products) are added. *  Please carry medication information at all times in case of emergency situations. These are general instructions for a healthy lifestyle:    No smoking/ No tobacco products/ Avoid exposure to second hand smoke  Surgeon General's Warning:  Quitting smoking now greatly reduces serious risk to your health. Obesity, smoking, and sedentary lifestyle greatly increases your risk for illness    A healthy diet, regular physical exercise & weight monitoring are important for maintaining a healthy lifestyle    You may be retaining fluid if you have a history of heart failure or if you experience any of the following symptoms:  Weight gain of 3 pounds or more overnight or 5 pounds in a week, increased swelling in our hands or feet or shortness of breath while lying flat in bed. Please call your doctor as soon as you notice any of these symptoms; do not wait until your next office visit. Recognize signs and symptoms of STROKE:    F-face looks uneven    A-arms unable to move or move unevenly    S-speech slurred or non-existent    T-time-call 911 as soon as signs and symptoms begin-DO NOT go       Back to bed or wait to see if you get better-TIME IS BRAIN. Warning Signs of HEART ATTACK     Call 911 if you have these symptoms:   Chest discomfort. Most heart attacks involve discomfort in the center of the chest that lasts more than a few minutes, or that goes away and comes back. It can feel like uncomfortable pressure, squeezing, fullness, or pain.  Discomfort in other areas of the upper body. Symptoms can include pain or discomfort in one or both arms, the back, neck, jaw, or stomach.  Shortness of breath with or without chest discomfort.  Other signs may include breaking out in a cold sweat, nausea, or lightheadedness. Don't wait more than five minutes to call 911 - MINUTES MATTER! Fast action can save your life. Calling 911 is almost always the fastest way to get lifesaving treatment. Emergency Medical Services staff can begin treatment when they arrive -- up to an hour sooner than if someone gets to the hospital by car. The discharge information has been reviewed with the patient and caregiver. The patient and caregiver verbalized understanding. Discharge medications reviewed with the patient and caregiver and appropriate educational materials and side effects teaching were provided.   ___________________________________________________________________________________________________________________________________

## 2019-01-17 NOTE — ROUTINE PROCESS
Patient: Thad García MRN: [id number]  SSN: [id number] YOB: 1945  Age: 68 y.o. Sex: female Patient is status post Procedure(s): RIGHT KNEE ARTHROSCOPY WITH ANTERIOR CRUCIATE LIGAMENT RECONSTRUCTION WITH HAMSTRING ALLOGRAFT. Surgeon(s) and Role: 
   Shellie Snider MD (Jody) - Primary Naomie Ruby MD - Fellow Local/Dose/Irrigation:   
 
             
Peripheral IV 01/17/19 Left Wrist (Active) Dressing/Packing:  Wound Knee Right-DRESSING TYPE: (4X4, CAST PADDING, ACE BANDAGE) (01/17/19 1100) Splint/Cast: Wound Knee Right-SPLINT TYPE/MATERIAL: (KNEE IMMOBILIZER, CRYO CUFF)] Other:

## 2019-01-17 NOTE — ANESTHESIA PREPROCEDURE EVALUATION
Anesthetic History No history of anesthetic complications Review of Systems / Medical History Patient summary reviewed, nursing notes reviewed and pertinent labs reviewed Pulmonary Neuro/Psych Psychiatric history Cardiovascular Exercise tolerance: >4 METS 
  
GI/Hepatic/Renal 
  
 
 
 
 
 
 Endo/Other Hypothyroidism Other Findings Physical Exam 
 
Airway Mallampati: I 
TM Distance: > 6 cm Neck ROM: normal range of motion Mouth opening: Normal 
 
 Cardiovascular Rhythm: regular Rate: normal 
 
 
 
 Dental 
No notable dental hx Pulmonary Breath sounds clear to auscultation Abdominal 
 
 
 
 Other Findings Anesthetic Plan ASA: 2 Anesthesia type: general and regional - supraclavicular block Induction: Intravenous Anesthetic plan and risks discussed with: Patient

## 2019-01-17 NOTE — PERIOP NOTES
1210:  Pt c/o starting of migraine and requesting Excedrine, unable to get that so given IV tylenol of h/a and knee pain along with toradol per Dr Eddy Mills. 1230:  Resting quietly, head under blanket for dimming of lights. 1540: Working with PT with rollator for support. Given Kathy for burning pain.

## 2019-01-17 NOTE — ANESTHESIA POSTPROCEDURE EVALUATION
Post-Anesthesia Evaluation and Assessment Patient: Nathan Garzon MRN: [id number]  SSN: [id number] YOB: 1945  Age: 68 y.o. Sex: female I have evaluated the patient and they are stable and ready for discharge from the PACU. Cardiovascular Function/Vital Signs Visit Vitals /73 (BP 1 Location: Right arm, BP Patient Position: At rest) Pulse 79 Temp 36.7 °C (98.1 °F) Resp 19 Ht 5' 5\" (1.651 m) Wt 65.8 kg (145 lb) SpO2 98% BMI 24.13 kg/m² Patient is status post MAC anesthesia for Procedure(s): RIGHT KNEE ARTHROSCOPY WITH ANTERIOR CRUCIATE LIGAMENT RECONSTRUCTION WITH HAMSTRING ALLOGRAFT. Nausea/Vomiting: None Postoperative hydration reviewed and adequate. Pain: 
Pain Scale 1: Numeric (0 - 10) (01/17/19 1209) Pain Intensity 1: 0 (01/17/19 0819) Managed Neurological Status:  
Neuro (WDL): Within Defined Limits (01/17/19 0800) At baseline Mental Status, Level of Consciousness: Alert and  oriented to person, place, and time Pulmonary Status:  
O2 Device: Nasal cannula (01/17/19 1132) Adequate oxygenation and airway patent Complications related to anesthesia: None Post-anesthesia assessment completed. No concerns Signed By: Nisha Diaz MD   
 January 17, 2019 Procedure(s): RIGHT KNEE ARTHROSCOPY WITH ANTERIOR CRUCIATE LIGAMENT RECONSTRUCTION WITH HAMSTRING ALLOGRAFT. 
 
<BSHSIANPOST> Visit Vitals /73 (BP 1 Location: Right arm, BP Patient Position: At rest) Pulse 79 Temp 36.7 °C (98.1 °F) Resp 19 Ht 5' 5\" (1.651 m) Wt 65.8 kg (145 lb) SpO2 98% BMI 24.13 kg/m²

## 2019-01-17 NOTE — ANESTHESIA PROCEDURE NOTES
Peripheral Block Start time: 1/17/2019 8:00 AM 
End time: 1/17/2019 8:20 AM 
Performed by: Bc Marquez MD 
Authorized by: Bc Marquez MD  
 
 
Pre-procedure: Indications: at surgeon's request and post-op pain management Preanesthetic Checklist: patient identified, risks and benefits discussed, site marked, timeout performed, anesthesia consent given and patient being monitored Timeout Time: 08:00 Block Type:  
Block Type:  Femoral single shot Laterality:  Right Monitoring:  Oxygen, responsive to questions, standard ASA monitoring, continuous pulse ox, frequent vital sign checks and heart rate Injection Technique:  Single shot Procedures: ultrasound guided and nerve stimulator Patient Position: supine Prep: chlorhexidine Location:  Upper thigh Needle Type:  Stimuplex Needle Gauge:  21 G Needle Localization:  Ultrasound guidance and nerve stimulator Assessment: 
Number of attempts:  1 Injection Assessment:  Ultrasound image on chart, no paresthesia, negative aspiration for CSF, incremental injection every 5 mL, low pressure verified by pressure monitor, no intravascular symptoms, negative aspiration for blood and local visualized surrounding nerve on ultrasound Patient tolerance:  Patient tolerated the procedure well with no immediate complications

## 2019-01-17 NOTE — ANESTHESIA PROCEDURE NOTES
Peripheral Block Start time: 1/17/2019 8:30 AM 
End time: 1/17/2019 8:40 AM 
Performed by: Israel Peters MD 
Authorized by: Israel Peters MD  
 
 
Pre-procedure: Indications: at surgeon's request   
Preanesthetic Checklist: patient identified, risks and benefits discussed, site marked, timeout performed, anesthesia consent given and patient being monitored Timeout Time: 08:30 Block Type:  
Block Type:  Sciatic single shot Laterality:  Right Monitoring:  Oxygen, responsive to questions, standard ASA monitoring, continuous pulse ox, frequent vital sign checks and heart rate Injection Technique:  Single shot Procedures: ultrasound guided and nerve stimulator Patient Position: supine Prep: chlorhexidine Location:  Mid thigh Needle Type:  Stimuplex Needle Gauge:  21 G Needle Localization:  Ultrasound guidance and nerve stimulator Assessment: 
Number of attempts:  1 Injection Assessment:  Ultrasound image on chart, no paresthesia, negative aspiration for CSF, incremental injection every 5 mL, low pressure verified by pressure monitor, no intravascular symptoms, negative aspiration for blood and local visualized surrounding nerve on ultrasound Patient tolerance:  Patient tolerated the procedure well with no immediate complications

## 2019-01-18 LAB — HGB BLD-MCNC: 13.8 G/DL (ref 11.5–16)

## 2019-01-24 NOTE — OP NOTES
Operative Report      Patient: Coco Montana MRN: [id number]  SSN: [id number]    YOB: 1945  Age: 68 y.o. Sex: female      Date of Surgery: 1/17/2019     Preoperative Diagnosis:    1. ACL TEAR RIGHT KNEE    Postoperative Diagnosis:  1. ACL TEAR RIGHT KNEE      2. Medial Meniscus tear       3. Medial Compartment DJD    Procedures: 1. RIGHT KNEE ARTHROSCOPY WITH ANTERIOR CRUCIATE LIGAMENT RECONSTRUCTION WITH HAMSTRING ALLOGRAFT    2. Partial medial meniscectomy    3. Medial femoral condyle chondroplasty      Surgeon(s) and Role:     Shellie Centeno MD (Jody) - Primary      First Assistant:  Rupert Driscoll PA-C    Second Assistant:    Kajal Ross MD - Fellow     Anesthesia: General with Block    Estimated Blood Loss:  15 ml      Specimens: * No specimens in log *     Complications: None     Pathology:  1. anterior cruciate ligament tear. Hospital Problems  Date Reviewed: 4/3/2018    None          Indications: The patient is a 68 y.o. female with a known history of an anterior cruciate ligament tear in the right knee. Preoperative physical examination, radiographs, and magnetic resonance imaging demonstrated an anterior cruciate ligament tear in Her right knee. She is now electively admitted for anterior cruciate ligament reconstruction. Procedure in Detail:After the procedure was described to the patient, including the risks benefits and possible complications, the patient signed the informed consent. The patient was then taken to the operating suite. Following induction of general anesthesia, femoral nerve block and administration of antibiotic, the patient was positioned on the operating table in the supine fashion. The right knee was then examined under anesthesia. The patient was noted to have a positive Lachman and positive pivot shift. At this point, the right leg was then prepped and draped in sterile fashion.  The right leg was then elevated and exsanguinated with an Esmarch bandage. The tourniquet was elevated to 300 mmHg. An allograft hamstring tendon was acquired from the tissue bank and carefully prepared on the back table and placed on stretch at 15 mmHg. It was quadrupled length 74 to prepare for an all inside technique. The graft had a diameter of 9.5 mm. The graft was then prepared in the standard fashion for the quadrupled tendon all-inside technique and placed on the graftmaster with 15mmHg of stretch. At this point, a lateral portal was created and the joint was distended and fully inspected. The scope was introduced into the knee. Diagnostic arthroscopy commenced. Suprapatellar pouch and medial and lateral gutters were visualized. The undersurface of the patella and trochlea groove were well visualized. There was minimal chondromalacia of the patella and minimal chondromalacia of the trochlea. The scope was then advanced into the medial compartment. The patient's leg was flexed 30 degrees. The medial  compartment was visualized in its entirety and a medial portal was created from outside in using a spinal needle for localization. The medial compartment was thoroughly evaluated and the meniscus visualized. There was  A posterior horn complex medialmeniscal tear. The medial tibial plateau was normal. The medial femoral condyle was  Slightly degenerative with grade 2 and small area of grade 3 change. The meniscus was carefully resected using a straight punch and a sucker shaver back to stable tissue. Only about 20% of the width of meniscus was removed. Condyle was also debrided and stabilized a sucker shaver back to stable tissue. There  were no areas of grade 4 change. The scope was then advanced to the notch. The anterior cruciate ligament was torn. The posterior cruciate ligament was intact. The scope was then introduced to the lateral compartment. Lateral meniscus was visualized in its entirety, both above and below the popliteal hiatus.  The lateral meniscus was normal. . The articular surfaces were normal. The scope was then advanced back in the notch. The soft tissue was debrided in the notch using a shaver. The medial wall of the lateral femoral condyle was denuded of all soft tissue as was the tibial insertion of the ACL . The medial wall of the lateral femoral condyle was denuded of all soft tissue as was the tibial insertion of the ACL . The anterior cruciate ligament drill guide was inserted on the appropriate position on the medial wall of the lateral femoral condyle at the ACL footprint. The flip cutter femoral guide was then used to insert the femoral pin up through the anterior cortex of the femur and into the footprint. The femoral tunnel was then drilled with a 9.5 mm flipcutter reamer at the 10 o'clock position to a depth of 27 mm. The posterior cortex was intact. The flipcutter was then passed up through the anterior tibia and noted to be in excellent position. The tibial tunnel was then reamed with an 9.5 mm flipcutter reamer. The tunnel was debrided of soft tissue with the shaver. The graft was then passed using the passing suture and the tightrope deployed over the anterolateral cortex. The graft was then advanced with the tightrope into the femoral tunnel to a depth of 35 mm. The tibial portion of the graft was then dunked into the tibial tunnel. The graft was then cycled 20 times by flexing and extending the knee. The tibial fixation was achieved using a button. The graft was tensioned at 30 degrees with a posterior drawer. Repeat tensioning was then done on the femoral side. At this point, the knee was examined. There was no further evidence of Lachman. At this point, the knee was then copiously irrigated. Arthroscopic equipment was removed from the knee. The arthroscopic portals were approximated using 3-0 nylon sutures. The anterior wound was closed with 0 Vicryl figure-of-eight sutures, 2-0 vicryl sutures,  and 2-0 nylon sutures.  A running prolene was used for the skin. A sterile dressing was applied. The Cryocuff and knee immobilizer were applied. The tourniquet was released after total time of 75 minutes. The patient was then transferred to the recovery room in stable condition. Racquel Carmona PA-C was of vital assistance during the performance of the procedure. She was instrumental in the preparation of the graft as well as positioning the leg and retracting soft tissue for graft harvest and leg position and hardware placement during graft insertion. Findings:ACL Tear    Tourniquet Time:   Total Tourniquet Time Documented:  Thigh (Right) - 75 minutes  Total: Thigh (Right) - 75 minutes       Hardware Utilized:   Implant Name Type Inv. Item Serial No.  Lot No. LRB No. Used Action   GRAFT TISS 60-80MML 7.5-10MMD -- Phyllistine Hayley - [id number]  GRAFT TISS 60-80MML 7.5-10MMD -- Reza Perez - [id number] [facility] TISSUE BANK N/A Right 1 Implanted   ALLOGRAFT GRAFTLINK CONVENIENCE PACK Other  N/A  [id number] Right [id number] Implanted        Closure: Primary    Signed: Shellie Aguilera MD (1/17/2019 at 7:54 AM)

## 2019-02-19 ENCOUNTER — TELEPHONE (OUTPATIENT)
Dept: INTERNAL MEDICINE CLINIC | Age: 74
End: 2019-02-19

## 2019-02-19 DIAGNOSIS — E78.00 HYPERCHOLESTEREMIA: ICD-10-CM

## 2019-02-19 DIAGNOSIS — E03.9 ACQUIRED HYPOTHYROIDISM: Primary | ICD-10-CM

## 2019-02-19 NOTE — TELEPHONE ENCOUNTER
Patient is scheduled for 03/08/2019 and would like her labs drawn before her visit. Please call her when the lab slip has been done. Her number is [phone]

## 2019-02-21 DIAGNOSIS — L85.3 XEROSIS OF SKIN: ICD-10-CM

## 2019-02-24 RX ORDER — AMMONIUM LACTATE 12 G/100G
CREAM TOPICAL
Qty: 280 G | Refills: 0 | Status: SHIPPED | OUTPATIENT
Start: 2019-02-24 | End: 2019-03-08 | Stop reason: SDUPTHER

## 2019-02-25 ENCOUNTER — HOSPITAL ENCOUNTER (OUTPATIENT)
Dept: LAB | Age: 74
Discharge: HOME | End: 2019-02-25
Payer: MEDICARE

## 2019-02-25 PROCEDURE — 84443 ASSAY THYROID STIM HORMONE: CPT

## 2019-02-25 PROCEDURE — 85025 COMPLETE CBC W/AUTO DIFF WBC: CPT

## 2019-02-25 PROCEDURE — 80053 COMPREHEN METABOLIC PANEL: CPT

## 2019-02-25 PROCEDURE — 36415 COLL VENOUS BLD VENIPUNCTURE: CPT

## 2019-02-25 PROCEDURE — 82043 UR ALBUMIN QUANTITATIVE: CPT

## 2019-02-25 PROCEDURE — 81001 URINALYSIS AUTO W/SCOPE: CPT

## 2019-02-25 PROCEDURE — 80061 LIPID PANEL: CPT

## 2019-02-26 LAB
ALBUMIN SERPL-MCNC: 4.5 G/DL (ref 3.5–4.8)
ALBUMIN/CREAT UR: 5.3 MG/G CREAT (ref 0–30)
ALBUMIN/GLOB SERPL: 1.9 {RATIO} (ref 1.2–2.2)
ALP SERPL-CCNC: 67 IU/L (ref 39–117)
ALT SERPL-CCNC: 16 IU/L (ref 0–32)
APPEARANCE UR: CLEAR
AST SERPL-CCNC: 20 IU/L (ref 0–40)
BACTERIA #/AREA URNS HPF: ABNORMAL /[HPF]
BASOPHILS # BLD AUTO: 0 X10E3/UL (ref 0–0.2)
BASOPHILS NFR BLD AUTO: 0 %
BILIRUB SERPL-MCNC: 0.2 MG/DL (ref 0–1.2)
BILIRUB UR QL STRIP: NEGATIVE
BUN SERPL-MCNC: 15 MG/DL (ref 8–27)
BUN/CREAT SERPL: 19 (ref 12–28)
CALCIUM SERPL-MCNC: 9.4 MG/DL (ref 8.7–10.3)
CASTS URNS QL MICRO: ABNORMAL /LPF
CHLORIDE SERPL-SCNC: 103 MMOL/L (ref 96–106)
CHOLEST SERPL-MCNC: 231 MG/DL (ref 100–199)
CO2 SERPL-SCNC: 26 MMOL/L (ref 20–29)
COLOR UR: YELLOW
CREAT SERPL-MCNC: 0.8 MG/DL (ref 0.57–1)
CREAT UR-MCNC: 126.5 MG/DL
CRYSTALS URNS MICRO: ABNORMAL
EOSINOPHIL # BLD AUTO: 0.2 X10E3/UL (ref 0–0.4)
EOSINOPHIL NFR BLD AUTO: 4 %
EPI CELLS #/AREA URNS HPF: ABNORMAL /HPF
ERYTHROCYTE [DISTWIDTH] IN BLOOD BY AUTOMATED COUNT: 14.3 % (ref 12.3–15.4)
GLOBULIN SER CALC-MCNC: 2.4 G/DL (ref 1.5–4.5)
GLUCOSE SERPL-MCNC: 89 MG/DL (ref 65–99)
GLUCOSE UR QL: NEGATIVE
HCT VFR BLD AUTO: 42.1 % (ref 34–46.6)
HDLC SERPL-MCNC: 47 MG/DL
HGB BLD-MCNC: 13.7 G/DL (ref 11.1–15.9)
HGB UR QL STRIP: NEGATIVE
IMM GRANULOCYTES # BLD AUTO: 0 X10E3/UL (ref 0–0.1)
IMM GRANULOCYTES NFR BLD AUTO: 0 %
INTERPRETATION, 910389: NORMAL
KETONES UR QL STRIP: NEGATIVE
LDLC SERPL CALC-MCNC: 157 MG/DL (ref 0–99)
LEUKOCYTE ESTERASE UR QL STRIP: NEGATIVE
LYMPHOCYTES # BLD AUTO: 1.6 X10E3/UL (ref 0.7–3.1)
LYMPHOCYTES NFR BLD AUTO: 36 %
MCH RBC QN AUTO: 29 PG (ref 26.6–33)
MCHC RBC AUTO-ENTMCNC: 32.5 G/DL (ref 31.5–35.7)
MCV RBC AUTO: 89 FL (ref 79–97)
MICRO URNS: NORMAL
MICRO URNS: NORMAL
MICROALBUMIN UR-MCNC: 6.7 UG/ML
MONOCYTES # BLD AUTO: 0.4 X10E3/UL (ref 0.1–0.9)
MONOCYTES NFR BLD AUTO: 9 %
MUCOUS THREADS URNS QL MICRO: PRESENT
NEUTROPHILS # BLD AUTO: 2.1 X10E3/UL (ref 1.4–7)
NEUTROPHILS NFR BLD AUTO: 51 %
NITRITE UR QL STRIP: NEGATIVE
PH UR STRIP: 7 [PH] (ref 5–7.5)
PLATELET # BLD AUTO: 227 X10E3/UL (ref 150–379)
POTASSIUM SERPL-SCNC: 4.8 MMOL/L (ref 3.5–5.2)
PROT SERPL-MCNC: 6.9 G/DL (ref 6–8.5)
PROT UR QL STRIP: NEGATIVE
RBC # BLD AUTO: 4.73 X10E6/UL (ref 3.77–5.28)
RBC #/AREA URNS HPF: ABNORMAL /HPF
SODIUM SERPL-SCNC: 143 MMOL/L (ref 134–144)
SP GR UR: 1.02 (ref 1–1.03)
TRIGL SERPL-MCNC: 133 MG/DL (ref 0–149)
TSH SERPL DL<=0.005 MIU/L-ACNC: 1.4 UIU/ML (ref 0.45–4.5)
UNIDENT CRYS URNS QL MICRO: PRESENT
URINALYSIS REFLEX, 377202: NORMAL
UROBILINOGEN UR STRIP-MCNC: 0.2 MG/DL (ref 0.2–1)
VLDLC SERPL CALC-MCNC: 27 MG/DL (ref 5–40)
WBC # BLD AUTO: 4.3 X10E3/UL (ref 3.4–10.8)
WBC #/AREA URNS HPF: ABNORMAL /HPF

## 2019-03-08 ENCOUNTER — OFFICE VISIT (OUTPATIENT)
Dept: INTERNAL MEDICINE CLINIC | Age: 74
End: 2019-03-08

## 2019-03-08 VITALS
TEMPERATURE: 97.1 F | SYSTOLIC BLOOD PRESSURE: 130 MMHG | OXYGEN SATURATION: 97 % | DIASTOLIC BLOOD PRESSURE: 86 MMHG | HEIGHT: 66 IN | WEIGHT: 147 LBS | HEART RATE: 64 BPM | RESPIRATION RATE: 18 BRPM | BODY MASS INDEX: 23.63 KG/M2

## 2019-03-08 DIAGNOSIS — E03.9 ACQUIRED HYPOTHYROIDISM: ICD-10-CM

## 2019-03-08 DIAGNOSIS — E78.00 HYPERCHOLESTEREMIA: ICD-10-CM

## 2019-03-08 DIAGNOSIS — R40.0 DAYTIME SOMNOLENCE: Primary | ICD-10-CM

## 2019-03-08 DIAGNOSIS — E55.9 VITAMIN D DEFICIENCY: ICD-10-CM

## 2019-03-08 DIAGNOSIS — L85.3 XEROSIS OF SKIN: ICD-10-CM

## 2019-03-08 RX ORDER — AMMONIUM LACTATE 12 G/100G
CREAM TOPICAL
Qty: 280 G | Refills: 2 | Status: SHIPPED | OUTPATIENT
Start: 2019-03-08 | End: 2020-02-25

## 2019-03-08 RX ORDER — TRAVOPROST OPHTHALMIC SOLUTION 0.04 MG/ML
SOLUTION OPHTHALMIC
COMMUNITY
Start: 2019-01-14 | End: 2019-09-13

## 2019-03-08 RX ORDER — LEVOTHYROXINE SODIUM 75 UG/1
75 TABLET ORAL
Qty: 90 TAB | Refills: 3 | Status: SHIPPED | OUTPATIENT
Start: 2019-03-08 | End: 2020-03-04 | Stop reason: DRUGHIGH

## 2019-03-08 RX ORDER — ESCITALOPRAM OXALATE 5 MG/1
5 TABLET ORAL DAILY
Qty: 90 TAB | Refills: 3 | Status: SHIPPED | OUTPATIENT
Start: 2019-03-08 | End: 2019-08-26 | Stop reason: SDUPTHER

## 2019-03-08 NOTE — PROGRESS NOTES
All health maintenance and other pertinent information has been reviewed in preparation for today's office visit. Patient presents in the office today for:    Chief Complaint   Patient presents with    Follow-up     Dsthymia, Acquired hypothyroidism      1. Have you been to the ER, urgent care clinic since your last visit? Hospitalized since your last visit? No    2. Have you seen or consulted any other health care providers outside of the [address] [place] [address] since your last visit? Include any pap smears or colon screening.  No

## 2019-03-08 NOTE — PROGRESS NOTES
Follow-up (Dsthymia, Acquired hypothyroidism )       HPI:  Ana Lilia Krueger is a 68y.o. year old female who is here for a follow up visit. She reports the following:      Chol improved. Not taking vit D. Fatigued recently. Daytime somnolence. Takes naps for 2 hrs at times. Sleepy waiting for me today. Sleeps adequate hours. Hypothyroidism    Patient is being follow for underactive thyroid and takes medication regularly. She is aware to take the medication on an empty stomach first thing in the morning and not to eat 30 min to 1 hr after taking their pill. No symptoms of hypo or hyperthyroidism: no decreased or increased weight, no feeling cold/chilly or excessively warm, no diarrhea or constipation, no undue sweatiness, anxiety or palpitations. Lab Results   Component Value Date/Time    TSH 1.400 02/25/2019 08:25 AM    TSH 0.358 (L) 03/30/2018 08:50 AM       Depression/Anxiety    Compliant with medications. No impulsive thoughts and/or side effects with medications   doing well on lexapro 5 mg          Assessment and Plan        1. Daytime somnolence  Not getting adequate sleep for some reason (restorative). Sleep on side. Referral important. Sleeps many hours and doesn't feel rested. No sx with exercise.  - SLEEP MEDICINE REFERRAL    2. Xerosis of skin  Refill given to patient. Works well  - ammonium lactate (AMLACTIN) 12 % topical cream; RUB IN TO AFFECTED AREA WELL. Dispense: 280 g; Refill: 2    3. Vitamin D deficiency  Patient compliant with Vit D. Emphasized importance of taking with food and not too much because it can be toxic to our body. Therefore, it should be checked regularly. 4. Hypercholesteremia  LDL still high but much improved from 170 to 157. She still wants to work on diet and exercise. 5. Acquired hypothyroidism  Tolerating thyroid medication and takes on empty stomach.   Aware to watch for symptoms of overactive thyroid like heat intolerance, loose stools, increase appetite, and weight loss along with palpitations. Pt informed if low thyroid symptoms, like cold intolerance, weight gain, hair loss, edema, and mental slowness, please call to get TSH rechecked. Noncompliance of medications can lead to coma and severe mental status changes. Stay on current regimen of  Synthroid. 6. Depression/dythymia  Reviewed side effects of medication and states depression is stable. No impulsive thoughts, sleep is not affected by medication. No GI symptoms. Pt instructed to call if above symptoms and agreed to stay on  lexapro 5 mg. Takes at night              Visit Vitals  /86 (BP 1 Location: Left arm, BP Patient Position: Sitting)   Pulse 64   Temp 97.1 °F (36.2 °C) (Oral)   Resp 18   Ht 5' 5.5\" (1.664 m)   Wt 147 lb (66.7 kg)   SpO2 97%   BMI 24.09 kg/m²       Historical Data    Past Medical History:   Diagnosis Date    Adverse effect of anesthesia     SLOW WAKING PAST ANESTHESIA, SEVERE HEADACHE    Depression     Headache(784.0)     migraines    Hypercholesterolemia     Hypothyroid     Nephrolithiasis     Scoliosis        Past Surgical History:   Procedure Laterality Date    ENDOSCOPY, COLON, DIAGNOSTIC  2009    neg. ([facility])-f/u 5 yrs.  HX DILATION AND CURETTAGE  1986    benign    HX GI      COLONOSCOPY    HX TONSILLECTOMY  childhood    T&A       Outpatient Encounter Medications as of 3/8/2019   Medication Sig Dispense Refill    travoprost (TRAVATAN Z) 0.004 % ophthalmic solution       escitalopram oxalate (LEXAPRO) 5 mg tablet Take 1 Tab by mouth daily. 90 Tab 3    levothyroxine (SYNTHROID) 75 mcg tablet Take 1 Tab by mouth Daily (before breakfast). 90 Tab 3    ammonium lactate (AMLACTIN) 12 % topical cream RUB IN TO AFFECTED AREA WELL. 280 g 2    loratadine (CLARITIN) 10 mg tablet Take 10 mg by mouth daily.  [DISCONTINUED] ammonium lactate (AMLACTIN) 12 % topical cream RUB IN TO AFFECTED AREA WELL.  280 g 0    [DISCONTINUED] escitalopram oxalate (LEXAPRO) 5 mg tablet Take 1 Tab by mouth daily. 90 Tab 3    [DISCONTINUED] levothyroxine (SYNTHROID) 75 mcg tablet Take 1 Tab by mouth Daily (before breakfast). 90 Tab 3     No facility-administered encounter medications on file as of 3/8/2019. Allergies   Allergen Reactions    Talwin [Pentazocine Lactate] Unknown (comments)     Altered sensorium    Amoxicillin Other (comments)     Diarrhea, stomach upset    Darvon [Propoxyphene] Unknown (comments)    Diamox Other (comments)     migraine    Morphine Other (comments)     Pt states she is very sensitive to all narcotics, feels like she will pass out    Simvastatin Other (comments)    Zithromax [Azithromycin] Other (comments)     depression        Social History     Socioeconomic History    Marital status:      Spouse name: Not on file    Number of children: Not on file    Years of education: Not on file    Highest education level: Not on file   Social Needs    Financial resource strain: Not on file    Food insecurity - worry: Not on file    Food insecurity - inability: Not on file   [employer] needs - medical: Not on file   [employer] needs - non-medical: Not on file   Occupational History    Not on file   Tobacco Use    Smoking status: Former Smoker     Years: 7.00     Last attempt to quit: 1970     Years since quittin.1    Smokeless tobacco: Never Used    Tobacco comment: SOCIAL SMOKER ON THE WEEKEND   Substance and Sexual Activity    Alcohol use:  Yes     Alcohol/week: 0.6 - 1.2 oz     Types: 1 Shots of liquor per week     Comment: OCCASIONALLY    Drug use: No    Sexual activity: Yes     Partners: Male   Other Topics Concern    Not on file   Social History Narrative    Not on file        family history includes Arrhythmia in her mother; Breast Cancer (age of onset: 61) in her sister; Cancer in her sister; Coronary Artery Disease (age of onset: 61) in her father; Diabetes in her father; Heart Disease in her brother and father; Heart Disease (age of onset: 61) in her mother; Heart Failure in her mother; Heart Surgery in her father; Hypertension in her mother. Review of Systems   Constitutional: Positive for malaise/fatigue. Negative for chills, fever and weight loss. Eyes: Negative for blurred vision. Respiratory: Negative for shortness of breath. Cardiovascular: Negative for chest pain, palpitations and leg swelling. Gastrointestinal: Negative for abdominal pain. Genitourinary: Negative for dysuria and frequency. Skin: Negative for rash. Neurological: Negative for dizziness, focal weakness, weakness and headaches. Endo/Heme/Allergies: Negative for environmental allergies. Does not bruise/bleed easily. Psychiatric/Behavioral: Negative for depression. The patient is not nervous/anxious and does not have insomnia. Physical Exam   Constitutional: She is oriented to person, place, and time. She appears well-developed and well-nourished. She is active. Non-toxic appearance. She does not have a sickly appearance. She does not appear ill. No distress. Eyes: Conjunctivae are normal. Right eye exhibits no discharge. Neck: Carotid bruit is not present. No thyroid mass and no thyromegaly present. Cardiovascular: Normal rate, regular rhythm, S1 normal, S2 normal, normal heart sounds and normal pulses. Exam reveals no gallop and no friction rub. Pulmonary/Chest: Effort normal and breath sounds normal. No respiratory distress. She has no wheezes. Abdominal: Soft. Bowel sounds are normal. She exhibits no mass. There is no tenderness. Musculoskeletal: She exhibits no edema, tenderness or deformity. Lymphadenopathy:     She has no cervical adenopathy. Neurological: She is alert and oriented to person, place, and time. Coordination normal.   Skin: Skin is warm and dry. No rash noted. No erythema. No pallor. Psychiatric: She has a normal mood and affect.  Her behavior is normal. Thought content normal.   Nursing note and vitals reviewed. Ortho Exam      Orders Placed This Encounter    SLEEP MEDICINE REFERRAL     Referral Priority:   Routine     Referral Type:   Consultation     Referral Reason:   Specialty Services Required     Ref[facility] Location[facility]   [facility]     Referred to Provider:   Taty Marquez MD    travoprost (TRAVATAN Z) 0.004 % ophthalmic solution    escitalopram oxalate (LEXAPRO) 5 mg tablet     Sig: Take 1 Tab by mouth daily. Dispense:  90 Tab     Refill:  3    levothyroxine (SYNTHROID) 75 mcg tablet     Sig: Take 1 Tab by mouth Daily (before breakfast). Dispense:  90 Tab     Refill:  3    ammonium lactate (AMLACTIN) 12 % topical cream     Sig: RUB IN TO AFFECTED AREA WELL. Dispense:  280 g     Refill:  2        I have reviewed the patient's medical history in detail and updated the computerized patient record. We had a prolonged discussion about these complex clinical issues and went over the various important aspects to consider. All questions were answered. Advised her to call back or return to office if symptoms do not improve, change in nature, or persist.    She was given an after visit summary or informed of [facility] which includes patient instructions, diagnoses, current medications, & vitals. She expressed understanding with the diagnosis and plan.

## 2019-03-08 NOTE — PATIENT INSTRUCTIONS
Telephone on 02/19/2019   Component Date Value Ref Range Status    WBC 02/25/2019 4.3  3.4 - 10.8 x10E3/uL Final    RBC 02/25/2019 4.73  3.77 - 5.28 x10E6/uL Final    HGB 02/25/2019 13.7  11.1 - 15.9 g/dL Final    HCT 02/25/2019 42.1  34.0 - 46.6 % Final    MCV 02/25/2019 89  79 - 97 fL Final    MCH 02/25/2019 29.0  26.6 - 33.0 pg Final    MCHC 02/25/2019 32.5  31.5 - 35.7 g/dL Final    RDW 02/25/2019 14.3  12.3 - 15.4 % Final    PLATELET 94/81/6734 202  150 - 379 x10E3/uL Final    NEUTROPHILS 02/25/2019 51  Not Estab. % Final    Lymphocytes 02/25/2019 36  Not Estab. % Final    MONOCYTES 02/25/2019 9  Not Estab. % Final    EOSINOPHILS 02/25/2019 4  Not Estab. % Final    BASOPHILS 02/25/2019 0  Not Estab. % Final    ABS. NEUTROPHILS 02/25/2019 2.1  1.4 - 7.0 x10E3/uL Final    Abs Lymphocytes 02/25/2019 1.6  0.7 - 3.1 x10E3/uL Final    ABS. MONOCYTES 02/25/2019 0.4  0.1 - 0.9 x10E3/uL Final    ABS. EOSINOPHILS 02/25/2019 0.2  0.0 - 0.4 x10E3/uL Final    ABS. BASOPHILS 02/25/2019 0.0  0.0 - 0.2 x10E3/uL Final    IMMATURE GRANULOCYTES 02/25/2019 0  Not Estab. % Final    ABS. IMM.  GRANS. 02/25/2019 0.0  0.0 - 0.1 x10E3/uL Final    Cholesterol, total 02/25/2019 231* 100 - 199 mg/dL Final    Triglyceride 02/25/2019 133  0 - 149 mg/dL Final    HDL Cholesterol 02/25/2019 47  >39 mg/dL Final    VLDL, calculated 02/25/2019 27  5 - 40 mg/dL Final    LDL, calculated 02/25/2019 157* 0 - 99 mg/dL Final    TSH 02/25/2019 1.400  0.450 - 4.500 uIU/mL Final    Glucose 02/25/2019 89  65 - 99 mg/dL Final    BUN 02/25/2019 15  8 - 27 mg/dL Final    Creatinine 02/25/2019 0.80  0.57 - 1.00 mg/dL Final    GFR est non-AA 02/25/2019 73  >59 mL/min/1.73 Final    GFR est AA 02/25/2019 85  >59 mL/min/1.73 Final    BUN/Creatinine ratio 02/25/2019 19  12 - 28 Final    Sodium 02/25/2019 143  134 - 144 mmol/L Final    Potassium 02/25/2019 4.8  3.5 - 5.2 mmol/L Final    Chloride 02/25/2019 103  96 - 106 mmol/L Final    CO2 02/25/2019 26  20 - 29 mmol/L Final    Calcium 02/25/2019 9.4  8.7 - 10.3 mg/dL Final    Protein, total 02/25/2019 6.9  6.0 - 8.5 g/dL Final    Albumin 02/25/2019 4.5  3.5 - 4.8 g/dL Final    GLOBULIN, TOTAL 02/25/2019 2.4  1.5 - 4.5 g/dL Final    A-G Ratio 02/25/2019 1.9  1.2 - 2.2 Final    Bilirubin, total 02/25/2019 0.2  0.0 - 1.2 mg/dL Final    Alk. phosphatase 02/25/2019 67  39 - 117 IU/L Final    AST (SGOT) 02/25/2019 20  0 - 40 IU/L Final    ALT (SGPT) 02/25/2019 16  0 - 32 IU/L Final    Creatinine, urine 02/25/2019 126.5  Not Estab. mg/dL Final    Microalbumin, urine 02/25/2019 6.7  Not Estab. ug/mL Final    Microalb/Creat ratio (ug/mg creat.) 02/25/2019 5.3  0.0 - 30.0 mg/g creat Final    Comment:                      Normal:                0.0 -  30.0                       Albuminuria:          31.0 - 300.0                       Clinical albuminuria:       >300.0      Specific Gravity 02/25/2019 1.021  1.005 - 1.030 Final    pH (UA) 02/25/2019 7.0  5.0 - 7.5 Final    Color 02/25/2019 Yellow  Yellow Final    Appearance 02/25/2019 Clear  Clear Final    Leukocyte Esterase 02/25/2019 Negative  Negative Final    Protein 02/25/2019 Negative  Negative/Trace Final    Glucose 02/25/2019 Negative  Negative Final    Ketone 02/25/2019 Negative  Negative Final    Blood 02/25/2019 Negative  Negative Final    Bilirubin 02/25/2019 Negative  Negative Final    Urobilinogen 02/25/2019 0.2  0.2 - 1.0 mg/dL Final    Nitrites 02/25/2019 Negative  Negative Final    Microscopic Examination 02/25/2019 Comment   Final    Microscopic follows if indicated.  Microscopic exam 02/25/2019 See additional order   Final    Microscopic was indicated and was performed.  URINALYSIS REFLEX 02/25/2019 Comment   Final    This specimen will not reflex to a Urine Culture.     WBC 02/25/2019 0-5  0 - 5 /hpf Final    RBC 02/25/2019 0-2  0 - 2 /hpf Final    Epithelial cells 02/25/2019 None seen  0 - 10 /hpf Final    Casts 02/25/2019 None seen  None seen /lpf Final    Crystals 02/25/2019 Present* N/A Final    Crystal type 02/25/2019 Calcium Oxalate  N/A Final    Mucus 02/25/2019 Present  Not Estab. Final    Bacteria 02/25/2019 Few  None seen/Few Final    INTERPRETATION 02/25/2019 Note   Final    Supplemental report is available.    Admission on 01/17/2019, Discharged on 01/17/2019   Component Date Value Ref Range Status    Hemoglobin (POC) 01/17/2019 13.8  11.5 - 16.0 g/dL Final   Hospital Outpatient Visit on 01/11/2019   Component Date Value Ref Range Status    Ventricular Rate 01/11/2019 59  BPM Final    Atrial Rate 01/11/2019 59  BPM Final    P-R Interval 01/11/2019 160  ms Final    QRS Duration 01/11/2019 76  ms Final    Q-T Interval 01/11/2019 452  ms Final    QTC Calculation (Bezet) 01/11/2019 447  ms Final    Calculated P Axis 01/11/2019 62  degrees Final    Calculated R Axis 01/11/2019 27  degrees Final    Calculated T Axis 01/11/2019 45  degrees Final    Diagnosis 01/11/2019    Final                    Value:Sinus bradycardia with occasional premature ventricular complexes  Otherwise normal ECG  No previous ECGs available  Confirmed by Jennifer Sewell MD, Goldie Addison) on 1/11/2019 6:53:05 PM

## 2019-06-12 ENCOUNTER — OFFICE VISIT (OUTPATIENT)
Dept: SLEEP MEDICINE | Age: 74
End: 2019-06-12

## 2019-06-12 VITALS
HEART RATE: 65 BPM | HEIGHT: 65 IN | OXYGEN SATURATION: 97 % | DIASTOLIC BLOOD PRESSURE: 84 MMHG | BODY MASS INDEX: 24.83 KG/M2 | SYSTOLIC BLOOD PRESSURE: 131 MMHG | WEIGHT: 149 LBS

## 2019-06-12 DIAGNOSIS — G47.33 OBSTRUCTIVE SLEEP APNEA (ADULT) (PEDIATRIC): Primary | ICD-10-CM

## 2019-06-12 NOTE — PROGRESS NOTES
[address] [place], [address]  Tel.  [phone]  Fax. 100 [facility] 60  [place], [address] [facility]  Tel.  [phone]  Fax. [phone] [address]  Tel.  [phone]  Fax. [phone]         Subjective:      Lane Bamberger is an 68 y.o. female referred for evaluation for a sleep disorder. She complains of excessive daytime sleepiness associated with possible snoring, not feeling refreshed. Symptoms began 1 year ago, gradually improving since that time. She usually can fall asleep in 15-20 minutes. Family or house members note whistling sound when she sleeps. She denies falling asleep while at work, driving. Lane Bamberger does wake up frequently at night. She is bothered by waking up too early and left unable to get back to sleep. She actually sleeps about 7 hours at night and wakes up about 2 times during the night. She does not work shifts:  . Once a week she will have a prolonged awakening of 2 hours or more. She lays in bed for awhile when she wakes up because she doesn't feel refreshed. Catalino Sanders indicates she does not get too little sleep at night. Her bedtime is 2230. She awakens at 0630. She does take naps. She takes 2 naps a week lasting 2. She has the following observed behaviors: Loud snoring, Grinding teeth; Nightmares. 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 She exercises daily. Dark room, no caffeine. No technology in the bedroom. She does not think about work in bed when trying to sleep. She says she feels like her energy is low. She feels tired all the time. She went off her low dose lexapro to see if this would reduce daytime fatigue and feels it is about 20% improved.  She does take thyroid medication, last TSH well within normal. She denies significant depression  [place] Sleepiness Score: 3      Allergies   Allergen Reactions    Talwin [Pentazocine Lactate] Unknown (comments)     Altered sensorium    Amoxicillin Other (comments)     Diarrhea, stomach upset    Darvon [Propoxyphene] Unknown (comments)    Diamox Other (comments)     migraine    Morphine Other (comments)     Pt states she is very sensitive to all narcotics, feels like she will pass out    Simvastatin Other (comments)    Zithromax [Azithromycin] Other (comments)     depression         Current Outpatient Medications:     levothyroxine (SYNTHROID) 75 mcg tablet, Take 1 Tab by mouth Daily (before breakfast). , Disp: 90 Tab, Rfl: 3    travoprost (TRAVATAN Z) 0.004 % ophthalmic solution, , Disp: , Rfl:     escitalopram oxalate (LEXAPRO) 5 mg tablet, Take 1 Tab by mouth daily. , Disp: 90 Tab, Rfl: 3    ammonium lactate (AMLACTIN) 12 % topical cream, RUB IN TO AFFECTED AREA WELL. , Disp: 280 g, Rfl: 2    loratadine (CLARITIN) 10 mg tablet, Take 10 mg by mouth daily. , Disp: , Rfl:      She  has a past medical history of Adverse effect of anesthesia, Depression, Headache(784.0), Hypercholesterolemia, Hypothyroid, Nephrolithiasis, and Scoliosis. She  has a past surgical history that includes endoscopy, colon, diagnostic (2009); hx gi; hx tonsillectomy (childhood); and hx dilation and curettage (1986). She family history includes Arrhythmia in her mother; Breast Cancer (age of onset: 61) in her sister; Cancer in her sister; Coronary Artery Disease (age of onset: 61) in her father; Diabetes in her father; Heart Disease in her brother and father; Heart Disease (age of onset: 61) in her mother; Heart Failure in her mother; Heart Surgery in her father; Hypertension in her mother. She  reports that she quit smoking about 49 years ago. She quit after 7.00 years of use. She has never used smokeless tobacco. She reports that she drinks about 0.6 - 1.2 oz of alcohol per week. She reports that she does not use drugs.      Review of Systems:  Constitutional:  No significant weight loss or weight gain.  Eyes:  No blurred vision. CVS:  No significant chest pain  Pulm:  No significant shortness of breath  GI:  No significant nausea or vomiting  :  No significant nocturia  Musculoskeletal:  No significant joint pain at night  Skin:  No significant rashes  Neuro:  No significant dizziness   Psych:  Has history of mild depression but feels it is controlled    Sleep Review of Systems: notable for no difficulty falling asleep; infrequent awakenings at night;  regular dreaming noted; no nightmares ; no early morning headaches; no memory problems; no concentration issues; no history of any automobile or occupational accidents due to daytime drowsiness. Objective:     Visit Vitals  /84 (BP 1 Location: Right arm, BP Patient Position: Sitting)   Pulse 65   Ht 5' 5\" (1.651 m)   Wt 149 lb (67.6 kg)   SpO2 97%   BMI 24.79 kg/m²         General:   Not in acute distress   Eyes:  Anicteric sclerae, no obvious strabismus   Nose:  No obvious nasal septum deviation    Oropharynx:   Class 2 oropharyngeal outlet, thick tongue base, enlarged and boggy uvula, low-lying soft palate, narrow tonsilo-pharyngeal pilars   Tonsils:   tonsils are absent   Neck:   Neck circ. in \"inches\": 13; midline trachea   Chest/Lungs:  Equal lung expansion, clear on auscultation    CVS:  Normal rate, regular rhythm; no JVD   Skin:  Warm to touch; no obvious rashes   Neuro:  No focal deficits ; no obvious tremor    Psych:  Normal affect,  normal countenance;          Assessment:       ICD-10-CM ICD-9-CM    1. Obstructive sleep apnea (adult) (pediatric) G47.33 327.23 POLYSOMNOGRAPHY 1 NIGHT         Plan:     * The patient currently has a Moderate Risk for having sleep apnea. STOP-BANG score 3.  * PSG was ordered for initial evaluation.   We will follow the American Academy of Sleep Medicine protocol regarding split-night procedures and offering a trial of Positive Airway Pressure (CPAP, BPAP, etc.)  * She was provided information on sleep apnea including coresponding risk factors and the importance of proper treatment. * Counseling was provided regarding proper sleep hygiene and safe driving. She is already practicing good sleep hygiene. She will try and increase intensity of exercise and will avoid lying in bed for that 30 minuts or so in the morning. She will check with her PMD regarding her thyroid to ensure dosage/medication is optimal.  * Treatment options for sleep apnea were reviewed. I will call her with the results. Thank you for allowing us to participate in your patient's medical care. We'll keep you updated on these investigations.   Electronically signed by    Karen Segura MD  Diplomate in Sleep Medicine  [facility]

## 2019-06-12 NOTE — PATIENT INSTRUCTIONS
[address] [facility] [place]. [place], [address]  Tel.  [phone]  Fax. 100 [facility] 60  [place], [address]  Tel.  [phone]  Fax. [phone] [address]  Tel.  [phone]  Fax. [phone]     PROPER SLEEP HYGIENE    What to avoid  · Do not have drinks with caffeine, such as coffee or black tea, for 8 hours before bed. · Do not smoke or use other types of tobacco near bedtime. Nicotine is a stimulant and can keep you awake. · Avoid drinking alcohol late in the evening, because it can cause you to wake in the middle of the night. · Do not eat a big meal close to bedtime. If you are hungry, eat a light snack. · Do not drink a lot of water close to bedtime, because the need to urinate may wake you up during the night. · Do not read or watch TV in bed. Use the bed only for sleeping and sexual activity. What to try  · Go to bed at the same time every night, and wake up at the same time every morning. Do not take naps during the day. · Keep your bedroom quiet, dark, and cool. · Get regular exercise, but not within 3 to 4 hours of your bedtime. .  · Sleep on a comfortable pillow and mattress. · If watching the clock makes you anxious, turn it facing away from you so you cannot see the time. · If you worry when you lie down, start a worry book. Well before bedtime, write down your worries, and then set the book and your concerns aside. · Try meditation or other relaxation techniques before you go to bed. · If you cannot fall asleep, get up and go to another room until you feel sleepy. Do something relaxing. Repeat your bedtime routine before you go to bed again. · Make your house quiet and calm about an hour before bedtime. Turn down the lights, turn off the TV, log off the computer, and turn down the volume on music. This can help you relax after a busy day.     Drowsy Driving  The [address] [place] [address] Road Traffic Safety Administration cites drowsiness as a causing factor in more than 942,206 police reported crashes annually, resulting in 76,000 injuries and 1,500 deaths. Other surveys suggest 55% of people polled have driven while drowsy in the past year, 23% had fallen asleep but not crashed, 3% crashed, and 2% had and accident due to drowsy driving. Who is at risk? Young Drivers: One study of drowsy driving accidents states that 55% of the drivers were under 25 years. Of those, 75% were male. Shift Workers and Travelers: People who work overnight or travel across time zones frequently are at higher risk of experiencing Circadian Rhythm Disorders. They are trying to work and function when their body is programed to sleep. Sleep Deprived: Lack of sleep has a serious impact on your ability to pay attention or focus on a task. Consistently getting less than the average of 8 hours your body needs creates partial or cumulative sleep deprivation. Untreated Sleep Disorders: Sleep Apnea, Narcolepsy, R.L.S., and other sleep disorders (untreated) prevent a person from getting enough restful sleep. This leads to excessive daytime sleepiness and increases the risk for drowsy driving accidents by up to 7 times. Medications / Alcohol: Even over the counter medications can cause drowsiness. Medications that impair a drivers attention should have a warning label. Alcohol naturally makes you sleepy and on its own can cause accidents. Combined with excessive drowsiness its effects are amplified. Signs of Drowsy Driving:   * You don't remember driving the last few miles   * You may drift out of your stephanie   * You are unable to focus and your thoughts wander   * You may yawn more often than normal   * You have difficulty keeping your eyes open / nodding off   * Missing traffic signs, speeding, or tailgating  Prevention-   Good sleep hygiene, lifestyle and behavioral choices have the most impact on drowsy driving.  There is no substitute for sleep and the average person requires 8 hours nightly. If you find yourself driving drowsy, stop and sleep. Consider the sleep hygiene tips provided during your visit as well. Medication Refill Policy: Refills for all medications require 1 week advance notice. Please have your pharmacy fax a refill request. We are unable to fax, or call in \"controled substance\" medications and you will need to pick these prescriptions up from our office. [employer] Activation    Thank you for requesting access to [employer]. Please follow the instructions below to securely access and download your online medical record. [employer] allows you to send messages to your doctor, view your test results, renew your prescriptions, schedule appointments, and more. How Do I Sign Up? 1. In your internet browser, go to [email or web address] 2. Click on the First Time User? Click Here link in the Sign In box. You will see the New Member Sign Up page. 3. Enter your [employer] Access Code exactly as it appears below. You will not need to use this code after youve completed the sign-up process. If you do not sign up before the expiration date, you must request a new code. [employer] Access Code: [email or web address]  Expires: 2019  3:20 PM (This is the date your [employer] access code will )    4. Enter the last four digits of your Social Security Number (xxxx) and Date of Birth (mm/dd/yyyy) as indicated and click Submit. You will be taken to the next sign-up page. 5. Create a [employer] ID. This will be your [employer] login ID and cannot be changed, so think of one that is secure and easy to remember. 6. Create a [employer] password. You can change your password at any time. 7. Enter your Password Reset Question and Answer. This can be used at a later time if you forget your password. 8. Enter your e-mail address. You will receive e-mail notification when new information is available in [address] 9. Click Sign Up.  You can now view and download portions of your medical record. 10. Click the Download Summary menu link to download a portable copy of your medical information. Additional Information    If you have questions, please call [phone]. Remember, [employer] is NOT to be used for urgent needs. For medical emergencies, dial 911.

## 2019-08-26 RX ORDER — ESCITALOPRAM OXALATE 5 MG/1
5 TABLET ORAL DAILY
Qty: 30 TAB | Refills: 0 | Status: SHIPPED | OUTPATIENT
Start: 2019-08-26 | End: 2020-02-25

## 2019-08-26 NOTE — TELEPHONE ENCOUNTER
Last refill- 3/8/19  Last office visit - 3/8/2019  Next office visit -   Future Appointments   Date Time Provider Simba Kayla   9/13/2019 10:40 AM MD JESÚS Felton   10/28/2019  9:30 PM BEDROOM [facility] SLEEP [facility] [place]         Requested Prescriptions     Pending Prescriptions Disp Refills    escitalopram oxalate (LEXAPRO) 5 mg tablet 30 Tab 0     Sig: Take 1 Tab by mouth daily.

## 2019-08-26 NOTE — TELEPHONE ENCOUNTER
Requested Prescriptions     Pending Prescriptions Disp Refills    escitalopram oxalate (LEXAPRO) 5 mg tablet 90 Tab 3     Sig: Take 1 Tab by mouth daily.      03/08/19 09/13/19    [facility] - [address] [place] [address], [address] [facility]

## 2019-09-10 ENCOUNTER — TELEPHONE (OUTPATIENT)
Dept: SLEEP MEDICINE | Age: 74
End: 2019-09-10

## 2019-09-12 NOTE — PROGRESS NOTES
Subjective:      Lexx Maldonado is a 68 y.o. female who presents today to become established. Prior PCP - Dr. Evelin Ramirez    She is also here for her Medicare Wellness Exam.     Active Medical Problems:  -hypothyroid  -daytime somnolence - sleep study? ? - rescheduled . -depression   -vitamin D deficiency  -hyperlipidemia - diet controlled  - ACL repair with Dr. Sami Rodriguez - right knee. 1/2019    Health Care Maintenance  -screening mammogram-2 years ago  -screening colonoscopy - 6/2009 -due at this time.   -immunizations - due for prevnar 13, shingrix    Patient Active Problem List   Diagnosis Code    Acquired hypothyroidism E03.9    Hypercholesteremia E78.00    History of colonoscopy Z98.890    Environmental allergies Z91.09    Xerosis of skin L85.3    Vitamin D deficiency E55.9    Osteopenia M85.80    Adjustment disorder F43.20    Migraine headache G43.909    Family history of breast cancer in first degree relative Z80.3     Current Outpatient Medications   Medication Sig Dispense Refill    levothyroxine (SYNTHROID) 75 mcg tablet Take 1 Tab by mouth Daily (before breakfast). 90 Tab 3    ammonium lactate (AMLACTIN) 12 % topical cream RUB IN TO AFFECTED AREA WELL. 280 g 2    escitalopram oxalate (LEXAPRO) 5 mg tablet Take 1 Tab by mouth daily. Need to establish with a new primary care provider for further refills. 30 Tab 0        Review of Systems    Pertinent items are noted in HPI.      Objective:     Visit Vitals  /90 (BP 1 Location: Left arm, BP Patient Position: Sitting)   Pulse 64   Temp 96.7 °F (35.9 °C) (Oral)   Resp 16   Ht 5' 3\" (1.6 m)   Wt 145 lb (65.8 kg)   SpO2 97%   BMI 25.69 kg/m²     General appearance: alert, cooperative, no distress, appears stated age  Head: Normocephalic, without obvious abnormality, atraumatic  Neck: supple, symmetrical, trachea midline, no adenopathy, thyroid: not enlarged, symmetric, no tenderness/mass/nodules, no carotid bruit and no JVD  Lungs: clear to auscultation bilaterally  Heart: regular rate and rhythm, S1, S2 normal, no murmur, click, rub or gallop  Abdomen: soft, non-tender. Bowel sounds normal. No masses,  no organomegaly  Extremities: extremities normal, atraumatic, no cyanosis or edema  Pulses: 2+ and symmetric    Assessment/Plan:     1. Medicare annual wellness visit, subsequent  -recommended getting the shingles vaccine. A prescription for Shingrix was given to the patient. - [place] ANNUAL ALCOHOL SCREEN 15 MIN  - DEPRESSION SCREEN ANNUAL    2. Screening for alcoholism    - [place] ANNUAL ALCOHOL SCREEN 15 MIN    3. Screening for depression    - DEPRESSION SCREEN ANNUAL    4. Hypothyroidism, adult  -clinically euthyroid  -compliant with use of her levothryoxine.     - TSH 3RD GENERATION  - T4, FREE    5. Depression, unspecified depression type  -I evaluated and recommended to continue current doses of medications. 6. Mixed hyperlipidemia  -due for fasting lipid panel . Diet controlled. - CBC WITH AUTOMATED DIFF  - METABOLIC PANEL, COMPREHENSIVE  - LIPID PANEL  - UA WITH REFLEX MICRO AND CULTURE    7. Daytime somnolence  -has sleep study scheduled for near future    8. Encounter for long-term (current) use of medications      9. Encounter to establish care with new doctor      10.  Encounter for immunization  -received her prevnar 15 today without any complications.     - PNEUMOCOCCAL CONJ VACCINE 13 VALENT IM  - ADMIN PNEUMOCOCCAL VACCINE     Orders Placed This Encounter    Depression Screen Annual    Pneumococcal Conjugate vaccine - 13 valent (50 years and older)    CBC WITH AUTOMATED DIFF    METABOLIC PANEL, COMPREHENSIVE    LIPID PANEL    TSH 3RD GENERATION    T4, FREE    UA WITH REFLEX MICRO AND CULTURE    Annual  Alcohol Screen 15 min ()    ADMIN PNEUMOCOCCAL VACCINE  Medicare Injection Admin Charge    varicella-zoster recombinant, PF, (SHINGRIX) 50 mcg/0.5 mL susr injection     Si.5 mL by IntraMUSCular route once for 1 dose. Repeat in 2-6 months     Dispense:  0.5 mL     Refill:  1      Follow-up Disposition:     Follow up in 6 months     Return if symptoms worsen or fail to improve. Advised patient to call back or return to office if symptoms worsen/change/persist.     Discussed expected course/resolution/complications of diagnosis in detail with patient. Medication risks/benefits/costs/interactions/alternatives discussed with patient. Patient was given an after visit summary which includes diagnoses, current medications, & vitals. Patient expressed understanding with the diagnosis and plan. This is the Subsequent Medicare Annual Wellness Exam, performed 12 months or more after the Initial AWV or the last Subsequent AWV    I have reviewed the patient's medical history in detail and updated the computerized patient record. History     Past Medical History:   Diagnosis Date    Adverse effect of anesthesia     SLOW WAKING PAST ANESTHESIA, SEVERE HEADACHE    Depression     Headache(784.0)     migraines    Hypercholesterolemia     Hypothyroid     Nephrolithiasis     Scoliosis       Past Surgical History:   Procedure Laterality Date    ENDOSCOPY, COLON, DIAGNOSTIC  2009    neg. ([place])-f/u 5 yrs.  HX CATARACT REMOVAL Right 06/2019    HX DILATION AND CURETTAGE  1986    benign    HX GI      COLONOSCOPY    HX ORTHOPAEDIC Right 01/17/2019    ACL replacement     HX TONSILLECTOMY  childhood    T&A     Current Outpatient Medications   Medication Sig Dispense Refill    levothyroxine (SYNTHROID) 75 mcg tablet Take 1 Tab by mouth Daily (before breakfast). 90 Tab 3    ammonium lactate (AMLACTIN) 12 % topical cream RUB IN TO AFFECTED AREA WELL. 280 g 2    escitalopram oxalate (LEXAPRO) 5 mg tablet Take 1 Tab by mouth daily. Need to establish with a new primary care provider for further refills.  30 Tab 0     Allergies   Allergen Reactions    Talwin [Pentazocine Lactate] Unknown (comments)     Altered sensorium    Amoxicillin Other (comments)     Diarrhea, stomach upset    Darvon [Propoxyphene] Unknown (comments)    Diamox Other (comments)     migraine    Morphine Other (comments)     Pt states she is very sensitive to all narcotics, feels like she will pass out    Simvastatin Other (comments)    Zithromax [Azithromycin] Other (comments)     depression     Family History   Problem Relation Age of Onset    Hypertension Mother     Heart Disease Mother 61        CHF/etoh    Arrhythmia Mother     Heart Failure Mother     Diabetes Father     Coronary Artery Disease Father 61        CABG (twice)    Heart Disease Father     Heart Surgery Father     Breast Cancer Sister 61    Cancer Sister         BREAST    Heart Disease Brother     Anesth Problems Neg Hx      Social History     Tobacco Use    Smoking status: Former Smoker     Years: 7.00     Last attempt to quit: 1970     Years since quittin.7    Smokeless tobacco: Never Used    Tobacco comment: SOCIAL SMOKER ON THE WEEKEND   Substance Use Topics    Alcohol use: Yes     Alcohol/week: 1.0 - 2.0 standard drinks     Types: 1 Shots of liquor per week     Comment: OCCASIONALLY     Patient Active Problem List   Diagnosis Code    Acquired hypothyroidism E03.9    Hypercholesteremia E78.00    History of colonoscopy Z98.890    Environmental allergies Z91.09    Xerosis of skin L85.3    Vitamin D deficiency E55.9    Osteopenia M85.80    Adjustment disorder F43.20    Migraine headache G43.909    Family history of breast cancer in first degree relative Z80.3       Depression Risk Factor Screening:     3 most recent PHQ Screens 2017   PHQ Not Done -   Little interest or pleasure in doing things Not at all   Feeling down, depressed, irritable, or hopeless Not at all   Total Score PHQ 2 0     Alcohol Risk Factor Screening: You do not drink alcohol or very rarely.     Functional Ability and Level of Safety:   Hearing Loss  Hearing is good.    Activities of Daily Living  The home contains: handrails and grab bars  Patient does total self care    Fall Risk  Fall Risk Assessment, last 12 mths 3/8/2019   Able to walk? No   Fall in past 12 months? -       Abuse Screen  Patient is not abused    Cognitive Screening   Evaluation of Cognitive Function:  Has your family/caregiver stated any concerns about your memory: no  Normal    Patient Care Team   Patient Care Team:  Pina Jones MD as PCP - General (Internal Medicine)  Moe Gonzalez MD (Otolaryngology)  Luiza Oropeza MD (Otolaryngology)  Sybil Burroughs MD (Dermatology)  Myrna Peters MD (Ophthalmology)    Assessment/Plan   Education and counseling provided:  Are appropriate based on today's review and evaluation    Diagnoses and all orders for this visit:    1. Medicare annual wellness visit, subsequent  -     [place] ANNUAL ALCOHOL SCREEN [address]    2.  Screening for alcoholism  -     [place] ANNUAL ALCOHOL SCREEN 15 MIN    3. Screening for depression  -     DEPRESSION SCREEN ANNUAL    Other orders  -     CBC WITH AUTOMATED DIFF  -     METABOLIC PANEL, COMPREHENSIVE  -     LIPID PANEL  -     TSH 3RD GENERATION  -     T4, FREE  -     UA WITH REFLEX MICRO AND CULTURE        Health Maintenance Due   Topic Date Due    Shingrix Vaccine Age 49> (1 of 2) 10/17/1995    GLAUCOMA SCREENING Q2Y  06/25/2014    Pneumococcal 65+ years (2 of 2 - PCV13) 09/18/2014    MEDICARE YEARLY EXAM  05/16/2018    COLONOSCOPY  06/09/2019    Influenza Age 9 to Adult  08/01/2019

## 2019-09-13 ENCOUNTER — OFFICE VISIT (OUTPATIENT)
Dept: INTERNAL MEDICINE CLINIC | Age: 74
End: 2019-09-13

## 2019-09-13 VITALS
SYSTOLIC BLOOD PRESSURE: 128 MMHG | HEART RATE: 64 BPM | OXYGEN SATURATION: 97 % | DIASTOLIC BLOOD PRESSURE: 90 MMHG | BODY MASS INDEX: 25.69 KG/M2 | HEIGHT: 63 IN | RESPIRATION RATE: 16 BRPM | WEIGHT: 145 LBS | TEMPERATURE: 96.7 F

## 2019-09-13 DIAGNOSIS — Z23 ENCOUNTER FOR IMMUNIZATION: ICD-10-CM

## 2019-09-13 DIAGNOSIS — E78.2 MIXED HYPERLIPIDEMIA: ICD-10-CM

## 2019-09-13 DIAGNOSIS — Z00.00 MEDICARE ANNUAL WELLNESS VISIT, SUBSEQUENT: Primary | ICD-10-CM

## 2019-09-13 DIAGNOSIS — R40.0 DAYTIME SOMNOLENCE: ICD-10-CM

## 2019-09-13 DIAGNOSIS — Z13.39 SCREENING FOR ALCOHOLISM: ICD-10-CM

## 2019-09-13 DIAGNOSIS — Z76.89 ENCOUNTER TO ESTABLISH CARE WITH NEW DOCTOR: ICD-10-CM

## 2019-09-13 DIAGNOSIS — Z79.899 ENCOUNTER FOR LONG-TERM (CURRENT) USE OF MEDICATIONS: ICD-10-CM

## 2019-09-13 DIAGNOSIS — Z13.31 SCREENING FOR DEPRESSION: ICD-10-CM

## 2019-09-13 DIAGNOSIS — E03.9 HYPOTHYROIDISM, ADULT: ICD-10-CM

## 2019-09-13 DIAGNOSIS — F32.A DEPRESSION, UNSPECIFIED DEPRESSION TYPE: ICD-10-CM

## 2019-09-13 NOTE — PROGRESS NOTES
Verified name and birth date for privacy precautions. Chart reviewed in preparation for today's visit.      Chief Complaint   Patient presents with   [address]     previous patient of Dr Cornejo Starch Maintenance Due   Topic    Shingrix Vaccine Age 50> (1 of 2)    GLAUCOMA SCREENING Q2Y     Pneumococcal 65+ years (2 of 2 - PCV13)    MEDICARE YEARLY EXAM     COLONOSCOPY     Influenza Age 5 to Adult          Wt Readings from Last 3 Encounters:   09/13/19 145 lb (65.8 kg)   06/12/19 149 lb (67.6 kg)   03/08/19 147 lb (66.7 kg)     Temp Readings from Last 3 Encounters:   09/13/19 96.7 °F (35.9 °C) (Oral)   03/08/19 97.1 °F (36.2 °C) (Oral)   01/17/19 98.1 °F (36.7 °C)     BP Readings from Last 3 Encounters:   09/13/19 128/90   06/12/19 131/84   03/08/19 130/86     Pulse Readings from Last 3 Encounters:   09/13/19 64   06/12/19 65   03/08/19 64         Learning Assessment:  :     Learning Assessment 9/13/2019 5/15/2017 6/22/2015 2/17/2014 4/18/2013   PRIMARY LEARNER Patient Patient Patient Patient Patient   HIGHEST LEVEL OF EDUCATION - PRIMARY LEARNER  > 4 YEARS OF COLLEGE GRADUATED HIGH SCHOOL OR GED > 4 YEARS OF COLLEGE > 4 YEARS OF COLLEGE -   BARRIERS PRIMARY LEARNER NONE - NONE NONE -   CO-LEARNER CAREGIVER No - No No -   PRIMARY LANGUAGE ENGLISH ENGLISH ENGLISH ENGLISH ENGLISH    NEED - - No No -   LEARNER PREFERENCE PRIMARY DEMONSTRATION DEMONSTRATION DEMONSTRATION LISTENING LISTENING     - - - DEMONSTRATION PICTURES   LEARNING SPECIAL TOPICS - - no no -   ANSWERED BY patient  Patient  patient patient patient   RELATIONSHIP SELF SELF SELF SELF SELF   ASSESSMENT COMMENT - - none none -       Depression Screening:  :     3 most recent PHQ Screens 9/13/2019   PHQ Not Done -   Little interest or pleasure in doing things Not at all   Feeling down, depressed, irritable, or hopeless Not at all   Total Score PHQ 2 0       Fall Risk Assessment:  :     Fall Risk Assessment, last [facility] 9/13/2019   Able to walk? Yes   Fall in past 12 months? No       Abuse Screening:  :     Abuse Screening Questionnaire 9/13/2019 5/15/2017 6/22/2015 4/1/2014 2/17/2014   Do you ever feel afraid of your partner? N N N N N   Are you in a relationship with someone who physically or mentally threatens you? N N N N N   Is it safe for you to go home? Y Y Y Y Y       Coordination of Care Questionnaire:  :     1) Have you been to an emergency room, urgent care clinic since your last visit? no   Hospitalized since your last visit? no             2) Have you seen or consulted any other health care providers outside of [address] [place] [address] since your last visit? no  (Include any pap smears or colon screenings in this section.)    3) Do you have an Advance Directive on file? no      Patient is currently accompanied by her self & I have received verbal consent from Robert Bennett to discuss any/all medical information while he/she is present in the room.     ------------------------------------------------

## 2019-09-13 NOTE — PROGRESS NOTES
Phi España is a 68 y.o. female who presents for routine immunization per verbal order from Leonel Page MD.  She denies any symptoms , reactions or allergies that would exclude them from being immunized today. Risks and adverse reactions were discussed and the VIS was given to her. All questions were addressed. She was observed for 10 min post injection. There were no reactions observed.

## 2019-09-13 NOTE — PATIENT INSTRUCTIONS
Medicare Wellness Visit, Female     The best way to live healthy is to have a lifestyle where you eat a well-balanced diet, exercise regularly, limit alcohol use, and quit all forms of tobacco/nicotine, if applicable. Regular preventive services are another way to keep healthy. Preventive services (vaccines, screening tests, monitoring & exams) can help personalize your care plan, which helps you manage your own care. Screening tests can find health problems at the earliest stages, when they are easiest to treat. [facility]rij[facility] Armando follows the current, evidence-based guidelines published by the [facility] ([facility]) when recommending preventive services for our patients. Because we follow these guidelines, sometimes recommendations change over time as research supports it. (For example, mammograms used to be recommended annually. Even though Medicare will still pay for an annual mammogram, the newer guidelines recommend a mammogram every two years for women of average risk.)  Of course, you and your doctor may decide to screen more often for some diseases, based on your risk and your health status. Preventive services for you include:  - Medicare offers their members a free annual wellness visit, which is time for you and your primary care provider to discuss and plan for your preventive service needs. Take advantage of this benefit every year!  -All adults over the age of 72 should receive the recommended pneumonia vaccines. Current USPSTF guidelines recommend a series of two vaccines for the best pneumonia protection.   -All adults should have a flu vaccine yearly and a tetanus vaccine every 10 years. All adults age 61 and older should receive a shingles vaccine once in their lifetime.    -A bone mass density test is recommended when a woman turns 65 to screen for osteoporosis. This test is only recommended one time, as a screening.  Some providers will use this same test as a disease monitoring tool if you already have osteoporosis. -All adults age 38-68 who are overweight should have a diabetes screening test once every three years.   -Other screening tests and preventive services for persons with diabetes include: an eye exam to screen for diabetic retinopathy, a kidney function test, a foot exam, and stricter control over your cholesterol.   -Cardiovascular screening for adults with routine risk involves an electrocardiogram (ECG) at intervals determined by your doctor.   -Colorectal cancer screenings should be done for adults age 54-65 with no increased risk factors for colorectal cancer. There are a number of acceptable methods of screening for this type of cancer. Each test has its own benefits and drawbacks. Discuss with your doctor what is most appropriate for you during your annual wellness visit. The different tests include: colonoscopy (considered the best screening method), a fecal occult blood test, a fecal DNA test, and sigmoidoscopy. -Breast cancer screenings are recommended every other year for women of normal risk, age 54-69.  -Cervical cancer screenings for women over age 72 are only recommended with certain risk factors.   -All adults born between [facility] should be screened once for Hepatitis C. Here is a list of your current Health Maintenance items (your personalized list of preventive services) with a due date:  Health Maintenance Due   Topic Date Due    Shingles Vaccine (1 of 2) 10/17/1995    Glaucoma Screening   06/25/2014    Pneumococcal Vaccine (2 of 2 - PCV13) 09/18/2014    Annual Well Visit  05/16/2018    Colonoscopy  06/09/2019    Flu Vaccine  08/01/2019         Vaccine Information Statement     Pneumococcal Conjugate Vaccine (PCV13): What You Need to Know    Many Vaccine Information Statements are available in Urdu and other languages. See www.immunize.org/vis.   Hojas de información Sobre Vacunas están disponibles en español y en muchos michael storey. Visite www.immunize.org/vis. 1. Why get vaccinated? Vaccination can protect both children and adults from pneumococcal disease. Pneumococcal disease is caused by bacteria that can spread from person to person through close contact. It can cause ear infections, and it can also lead to more serious infections of the:   Lungs (pneumonia),   Blood (bacteremia), and   Covering of the brain and spinal cord (meningitis). Pneumococcal pneumonia is most common among adults. Pneumococcal meningitis can cause deafness and brain damage, and it kills about 1 child in 10 who get it. Anyone can get pneumococcal disease, but children under 3years of age and adults 72 years and older, people with certain medical conditions, and cigarette smokers are at the highest risk. Before there was a vaccine, the [facility] saw:   more than 700 cases of meningitis,   about 13,000 blood infections,   about 5 million ear infections, and   about 200 deaths  in children under 5 each year from pneumococcal disease. Since vaccine became available, severe pneumococcal disease in these children has fallen by 88%. About 18,000 older adults die of pneumococcal disease each year in the [place]. Treatment of pneumococcal infections with penicillin and other drugs is not as effective as it used to be, because some strains of the disease have become resistant to these drugs. This makes prevention of the disease, through vaccination, even more important. 2. PCV13 vaccine    Pneumococcal conjugate vaccine (called PCV13) protects against 13 types of pneumococcal bacteria. PCV13 is routinely given to children at 2, 4, 6, and 1515 months of age. It is also recommended for children and adults 3to 59years of age with certain health conditions, and for all adults 72years of age and older. Your doctor can give you details.     3. Some people should not get this vaccine    Anyone who has ever had a life-threatening allergic reaction to a dose of this vaccine, to an earlier pneumococcal vaccine called PCV7, or to any vaccine containing diphtheria toxoid (for example, DTaP), should not get PCV13. Anyone with a severe allergy to any component of PCV13 should not get the vaccine. Tell your doctor if the person being vaccinated has any severe allergies. If the person scheduled for vaccination is not feeling well, your healthcare provider might decide to reschedule the shot on another day. 4. Risks of a vaccine reaction    With any medicine, including vaccines, there is a chance of reactions. These are usually mild and go away on their own, but serious reactions are also possible. Problems reported following PCV13 varied by age and dose in the series. The most common problems reported among children were:    About half became drowsy after the shot, had a temporary loss of appetite, or had redness or tenderness where the shot was given.  About 1 out of 3 had swelling where the shot was given.  About 1 out of 3 had a mild fever, and about 1 in 20 had a fever over 102.2°F.   Up to about 8 out of 10 became fussy or irritable. Adults have reported pain, redness, and swelling where the shot was given; also mild fever, fatigue, headache, chills, or muscle pain. [facility] children who get PCV13 along with inactivated flu vaccine at the same time may be at increased risk for seizures caused by fever. Ask your doctor for more information. Problems that could happen after any vaccine:     People sometimes faint after a medical procedure, including vaccination. Sitting or lying down for about 15 minutes can help prevent fainting, and injuries caused by a fall. Tell your doctor if you feel dizzy, or have vision changes or ringing in the ears.  Some older children and adults get severe pain in the shoulder and have difficulty moving the arm where a shot was given.  This happens very rarely.  Any medication can cause a severe allergic reaction. Such reactions from a vaccine are very rare, estimated at about 1 in a million doses, and would happen within a few minutes to a few hours after the vaccination. As with any medicine, there is a very small chance of a vaccine causing a serious injury or death. The safety of vaccines is always being monitored. For more information, visit: www.cdc.gov/vaccinesafety/     5. What if there is a serious reaction? What should I look for?  Look for anything that concerns you, such as signs of a severe allergic reaction, very high fever, or unusual behavior. Signs of a severe allergic reaction can include hives, swelling of the face and throat, difficulty breathing, a fast heartbeat, dizziness, and weakness  usually within a few minutes to a few hours after the vaccination. What should I do?  If you think it is a severe allergic reaction or other emergency that cant wait, call 9-1-1 or get the person to the nearest hospital. Otherwise, call your doctor. Reactions should be reported to the Vaccine Adverse Event Reporting System (VAERS). Your doctor should file this report, or you can do it yourself through the VAERS web site at www.vaers. [facility].gov, or by calling 3-593.635.8949. [employer] does not give medical advice. 6. The National Vaccine Injury Compensation Program    The [facility] Vaccine Injury Compensation Program (VICP) is a federal program that was created to compensate people who may have been injured by certain vaccines. Persons who believe they may have been injured by a vaccine can learn about the program and about filing a claim by calling 9-286.412.8069 or visiting the [employer] website at www.[facility].gov/vaccinecompensation. There is a time limit to file a claim for compensation. 7. How can I learn more?  Ask your healthcare provider.  He or she can give you the vaccine package insert or suggest other sources of information.  Call your local or state health department.  Contact the Centers for Disease Control and Prevention (CDC):  - Call 3-746.482.2568 (1-800-CDC-INFO) or  - Visit CDCs website at www.cdc.gov/vaccines    Vaccine Information Statement   PCV13 Vaccine   11/5/2015   [address] [id number][address][id number]    Department of Health and Human Services  Centers for Disease Control and Prevention    Office Use Only

## 2019-09-20 ENCOUNTER — HOSPITAL ENCOUNTER (OUTPATIENT)
Dept: LAB | Age: 74
Discharge: HOME | End: 2019-09-20
Payer: MEDICARE

## 2019-09-20 PROCEDURE — 81001 URINALYSIS AUTO W/SCOPE: CPT

## 2019-09-20 PROCEDURE — 80061 LIPID PANEL: CPT

## 2019-09-20 PROCEDURE — 80053 COMPREHEN METABOLIC PANEL: CPT

## 2019-09-20 PROCEDURE — 84443 ASSAY THYROID STIM HORMONE: CPT

## 2019-09-20 PROCEDURE — 84439 ASSAY OF FREE THYROXINE: CPT

## 2019-09-20 PROCEDURE — 85025 COMPLETE CBC W/AUTO DIFF WBC: CPT

## 2019-09-21 LAB
ALBUMIN SERPL-MCNC: 4.4 G/DL (ref 3.5–4.8)
ALBUMIN/GLOB SERPL: 2.1 {RATIO} (ref 1.2–2.2)
ALP SERPL-CCNC: 58 IU/L (ref 39–117)
ALT SERPL-CCNC: 17 IU/L (ref 0–32)
APPEARANCE UR: CLEAR
AST SERPL-CCNC: 25 IU/L (ref 0–40)
BACTERIA #/AREA URNS HPF: ABNORMAL /[HPF]
BASOPHILS # BLD AUTO: 0 X10E3/UL (ref 0–0.2)
BASOPHILS NFR BLD AUTO: 1 %
BILIRUB SERPL-MCNC: 0.4 MG/DL (ref 0–1.2)
BILIRUB UR QL STRIP: NEGATIVE
BUN SERPL-MCNC: 16 MG/DL (ref 8–27)
BUN/CREAT SERPL: 21 (ref 12–28)
CALCIUM SERPL-MCNC: 9.3 MG/DL (ref 8.7–10.3)
CASTS URNS QL MICRO: ABNORMAL /LPF
CHLORIDE SERPL-SCNC: 103 MMOL/L (ref 96–106)
CHOLEST SERPL-MCNC: 246 MG/DL (ref 100–199)
CO2 SERPL-SCNC: 25 MMOL/L (ref 20–29)
COLOR UR: YELLOW
CREAT SERPL-MCNC: 0.76 MG/DL (ref 0.57–1)
CRYSTALS URNS MICRO: ABNORMAL
EOSINOPHIL # BLD AUTO: 0.1 X10E3/UL (ref 0–0.4)
EOSINOPHIL NFR BLD AUTO: 3 %
EPI CELLS #/AREA URNS HPF: ABNORMAL /HPF (ref 0–10)
ERYTHROCYTE [DISTWIDTH] IN BLOOD BY AUTOMATED COUNT: 13 % (ref 12.3–15.4)
GLOBULIN SER CALC-MCNC: 2.1 G/DL (ref 1.5–4.5)
GLUCOSE SERPL-MCNC: 81 MG/DL (ref 65–99)
GLUCOSE UR QL: NEGATIVE
HCT VFR BLD AUTO: 44.8 % (ref 34–46.6)
HDLC SERPL-MCNC: 52 MG/DL
HGB BLD-MCNC: 14.8 G/DL (ref 11.1–15.9)
HGB UR QL STRIP: ABNORMAL
IMM GRANULOCYTES # BLD AUTO: 0 X10E3/UL (ref 0–0.1)
IMM GRANULOCYTES NFR BLD AUTO: 0 %
INTERPRETATION, 910389: NORMAL
KETONES UR QL STRIP: NEGATIVE
LDLC SERPL CALC-MCNC: 174 MG/DL (ref 0–99)
LEUKOCYTE ESTERASE UR QL STRIP: NEGATIVE
LYMPHOCYTES # BLD AUTO: 1.4 X10E3/UL (ref 0.7–3.1)
LYMPHOCYTES NFR BLD AUTO: 41 %
MCH RBC QN AUTO: 29.8 PG (ref 26.6–33)
MCHC RBC AUTO-ENTMCNC: 33 G/DL (ref 31.5–35.7)
MCV RBC AUTO: 90 FL (ref 79–97)
MICRO URNS: ABNORMAL
MONOCYTES # BLD AUTO: 0.3 X10E3/UL (ref 0.1–0.9)
MONOCYTES NFR BLD AUTO: 10 %
MUCOUS THREADS URNS QL MICRO: PRESENT
NEUTROPHILS # BLD AUTO: 1.5 X10E3/UL (ref 1.4–7)
NEUTROPHILS NFR BLD AUTO: 45 %
NITRITE UR QL STRIP: NEGATIVE
PH UR STRIP: 5.5 [PH] (ref 5–7.5)
PLATELET # BLD AUTO: 222 X10E3/UL (ref 150–450)
POTASSIUM SERPL-SCNC: 4.4 MMOL/L (ref 3.5–5.2)
PROT SERPL-MCNC: 6.5 G/DL (ref 6–8.5)
PROT UR QL STRIP: NEGATIVE
RBC # BLD AUTO: 4.97 X10E6/UL (ref 3.77–5.28)
RBC #/AREA URNS HPF: ABNORMAL /HPF (ref 0–2)
SODIUM SERPL-SCNC: 143 MMOL/L (ref 134–144)
SP GR UR: 1.02 (ref 1–1.03)
T4 FREE SERPL-MCNC: 1.25 NG/DL (ref 0.82–1.77)
TRIGL SERPL-MCNC: 102 MG/DL (ref 0–149)
TSH SERPL DL<=0.005 MIU/L-ACNC: 0.73 UIU/ML (ref 0.45–4.5)
UNIDENT CRYS URNS QL MICRO: PRESENT
URINALYSIS REFLEX, 377202: ABNORMAL
UROBILINOGEN UR STRIP-MCNC: 0.2 MG/DL (ref 0.2–1)
VLDLC SERPL CALC-MCNC: 20 MG/DL (ref 5–40)
WBC # BLD AUTO: 3.3 X10E3/UL (ref 3.4–10.8)
WBC #/AREA URNS HPF: ABNORMAL /HPF (ref 0–5)

## 2019-09-23 NOTE — PROGRESS NOTES
Heart CT done 12/2015 showed calcium score of 0. Statin intolerant. Recommended repeating heart CT.  Letter sent 9/23/2019

## 2019-09-25 ENCOUNTER — HOSPITAL ENCOUNTER (OUTPATIENT)
Dept: MAMMOGRAPHY | Age: 74
Discharge: HOME | End: 2019-09-25
Attending: INTERNAL MEDICINE
Payer: MEDICARE

## 2019-09-25 DIAGNOSIS — Z12.31 VISIT FOR SCREENING MAMMOGRAM: ICD-10-CM

## 2019-09-25 PROCEDURE — 77063 BREAST TOMOSYNTHESIS BI: CPT

## 2019-09-26 PROBLEM — Z92.89 H/O SCREENING MAMMOGRAPHY: Chronic | Status: ACTIVE | Noted: 2019-09-26

## 2019-10-08 ENCOUNTER — TELEPHONE (OUTPATIENT)
Dept: INTERNAL MEDICINE CLINIC | Age: 74
End: 2019-10-08

## 2019-10-08 DIAGNOSIS — E78.2 MIXED HYPERLIPIDEMIA: Primary | ICD-10-CM

## 2019-10-08 NOTE — TELEPHONE ENCOUNTER
Patient left message with call center that she needs to schedule a heart CT scan. Please return call.

## 2019-10-16 ENCOUNTER — HOSPITAL ENCOUNTER (OUTPATIENT)
Dept: CT IMAGING | Age: 74
Discharge: HOME | End: 2019-10-16
Attending: INTERNAL MEDICINE
Payer: SELF-PAY

## 2019-10-16 DIAGNOSIS — E78.2 MIXED HYPERLIPIDEMIA: ICD-10-CM

## 2019-10-16 PROCEDURE — 75571 CT HRT W/O DYE W/CA TEST: CPT

## 2019-10-25 NOTE — PROGRESS NOTES
Calcium score is 0. Will hold use of statin at this time. Continue low fat diet and regular exercise.  Letter sent 10/24/2019

## 2019-10-28 ENCOUNTER — HOSPITAL ENCOUNTER (OUTPATIENT)
Dept: SLEEP MEDICINE | Age: 74
Discharge: HOME | End: 2019-10-28
Attending: INTERNAL MEDICINE
Payer: MEDICARE

## 2019-10-28 VITALS
HEIGHT: 65 IN | WEIGHT: 149 LBS | DIASTOLIC BLOOD PRESSURE: 90 MMHG | OXYGEN SATURATION: 96 % | BODY MASS INDEX: 24.83 KG/M2 | SYSTOLIC BLOOD PRESSURE: 149 MMHG | HEART RATE: 72 BPM | TEMPERATURE: 96.9 F

## 2019-10-28 DIAGNOSIS — G47.33 OBSTRUCTIVE SLEEP APNEA (ADULT) (PEDIATRIC): ICD-10-CM

## 2019-10-28 PROCEDURE — 95810 POLYSOM 6/> YRS 4/> PARAM: CPT | Performed by: INTERNAL MEDICINE

## 2019-11-05 ENCOUNTER — OFFICE VISIT (OUTPATIENT)
Dept: INTERNAL MEDICINE CLINIC | Age: 74
End: 2019-11-05

## 2019-11-05 VITALS
TEMPERATURE: 98.5 F | HEIGHT: 65 IN | RESPIRATION RATE: 17 BRPM | WEIGHT: 146 LBS | BODY MASS INDEX: 24.32 KG/M2 | SYSTOLIC BLOOD PRESSURE: 146 MMHG | HEART RATE: 50 BPM | DIASTOLIC BLOOD PRESSURE: 86 MMHG | OXYGEN SATURATION: 97 %

## 2019-11-05 DIAGNOSIS — R09.82 POST-NASAL DRAINAGE: ICD-10-CM

## 2019-11-05 DIAGNOSIS — R05.9 COUGH: Primary | ICD-10-CM

## 2019-11-05 RX ORDER — ESTRADIOL 0.1 MG/G
CREAM VAGINAL
COMMUNITY
Start: 2019-10-31 | End: 2021-11-16 | Stop reason: SDUPTHER

## 2019-11-05 RX ORDER — LORATADINE 10 MG/1
10 TABLET ORAL
COMMUNITY
End: 2020-07-22 | Stop reason: SDUPTHER

## 2019-11-05 NOTE — PROGRESS NOTES
77 yo female with cough. Started a week ago with sore throat, then nasal drip with post-nasal component. Head still feels tight, but she \"can breathe\". No fever. She feels chest rattle, but no mucus production. She has taken Mucinex-DM for several days. She is leaving for a trip to [place] in several days. PE: WNWD WF w/ deep cough   T - 98.5   Phar - posterior lymphoid tissue accentuation   Neck - no nodes   Ears - TMs - nl   Nasal membranes - moderately boggy   Lungs - clear    Imp: Cough   Post-nasal drainage    Plan: Saline NS   Nasacort in AM   Mucinex   Hydration   Delsym   Call if sxs worsen or fever develops  ____________________________  Expected course of current diagnosed problem(s) as well as expected progression and possible complications, and desired follow up with provider are discussed with patient. Patient is encouraged to be back in touch with any questions or concerns. Patient expresses understanding of plan of care. Patient is given AVS which includes diagnoses, current medications, vitals.

## 2019-11-05 NOTE — PROGRESS NOTES
Verified name and birth date for privacy precautions. Chart reviewed in preparation for today's visit. Chief Complaint   Patient presents with    Cough     Pt c/o \"really heavy drainage with deep bronchial cough\". reports cough to be nonproductive x1 week. Taking Mucinex- DM x4 days. There are no preventive care reminders to display for this patient. Wt Readings from Last 3 Encounters:   11/05/19 146 lb (66.2 kg)   10/28/19 149 lb (67.6 kg)   09/13/19 145 lb (65.8 kg)     Temp Readings from Last 3 Encounters:   11/05/19 98.5 °F (36.9 °C) (Oral)   10/28/19 96.9 °F (36.1 °C)   09/13/19 96.7 °F (35.9 °C) (Oral)     BP Readings from Last 3 Encounters:   11/05/19 146/86   10/28/19 149/90   09/13/19 128/90     Pulse Readings from Last 3 Encounters:   11/05/19 (!) 50   10/28/19 72   09/13/19 64         Learning Assessment:  :     Learning Assessment 9/13/2019 5/15/2017 6/22/2015 2/17/2014 4/18/2013   PRIMARY LEARNER Patient Patient Patient Patient Patient   HIGHEST LEVEL OF EDUCATION - PRIMARY LEARNER  > 4 YEARS OF COLLEGE GRADUATED HIGH SCHOOL OR GED > 4 YEARS OF COLLEGE > 4 YEARS OF COLLEGE -   BARRIERS PRIMARY LEARNER NONE - NONE NONE -   CO-LEARNER CAREGIVER No - No No -   PRIMARY LANGUAGE ENGLISH ENGLISH ENGLISH ENGLISH ENGLISH    NEED - - No No -   LEARNER PREFERENCE PRIMARY DEMONSTRATION DEMONSTRATION DEMONSTRATION LISTENING LISTENING     - - - DEMONSTRATION PICTURES   LEARNING SPECIAL TOPICS - - no no -   ANSWERED BY patient  Patient  patient patient patient   RELATIONSHIP SELF SELF SELF SELF SELF   ASSESSMENT COMMENT - - none none -       Depression Screening:  :     3 most recent PHQ Screens 9/13/2019   PHQ Not Done -   Little interest or pleasure in doing things Not at all   Feeling down, depressed, irritable, or hopeless Not at all   Total Score PHQ 2 0       Fall Risk Assessment:  :     Fall Risk Assessment, last 12 mths 9/13/2019   Able to walk? Yes   Fall in past 12 months? No       Abuse Screening:  :     Abuse Screening Questionnaire 9/13/2019 5/15/2017 6/22/2015 4/1/2014 2/17/2014   Do you ever feel afraid of your partner? N N N N N   Are you in a relationship with someone who physically or mentally threatens you? N N N N N   Is it safe for you to go home?  Y Y Y Y Y       Coordination of Care Questionnaire:  :     1) Have you been to an emergency room, urgent care clinic since your last visit? no   Hospitalized since your last visit? no             2) Have you seen or consulted any other health care providers outside of [address] [place] [address] since your last visit? no  (Include any pap smears or colon screenings in this section.)    3) Do you have an Advance Directive on file? no      Patient is currently accompanied by her self.     ------------------------------------------------

## 2019-11-05 NOTE — PATIENT INSTRUCTIONS
1. Mucinex (Guaifenesen) plain-Blue Box 600 mg. Take one twice daily with full glass water   Take for 10 days    2. Saline Nasal Spray - use liberally to flush post-nasal area; use as many times a day as desired. Keep spraying with head tilted back until you feel need to swallow    3. Drink lots of fluids (mainly water) to keep mucus thinner    4. If needed, for cough, we recommend Delsym cough syrup    5. Long acting antihistamine (Allegra/Fexofenadine or Zyrtec/Ceterizine 10 mg at bedtime) is useful if allergy symptoms are also present    6. Oral decongestants should not be used as  they actually contribute to overdrying/thickening of the mucus, and can raise the BP and overstimulate the heart    7.  Steroid nasal spray (Nasacort) - 2 sprays each nostril once daily in AM; use with head in upright position

## 2019-11-08 ENCOUNTER — TELEPHONE (OUTPATIENT)
Dept: SLEEP MEDICINE | Age: 74
End: 2019-11-08

## 2019-11-22 ENCOUNTER — DOCUMENTATION ONLY (OUTPATIENT)
Dept: SLEEP MEDICINE | Age: 74
End: 2019-11-22

## 2020-01-07 ENCOUNTER — TELEPHONE (OUTPATIENT)
Dept: INTERNAL MEDICINE CLINIC | Age: 75
End: 2020-01-07

## 2020-01-07 DIAGNOSIS — M54.9 BACK PAIN, UNSPECIFIED BACK LOCATION, UNSPECIFIED BACK PAIN LATERALITY, UNSPECIFIED CHRONICITY: Primary | ICD-10-CM

## 2020-01-07 NOTE — TELEPHONE ENCOUNTER
Patient has extreme back pain and has gotten an appt with her PT for tomorrow morning at [address] [place] [address], ph# [phone]. Is requesting a referral for this appt. Will be in session with clients from 2:00-4:00 this afternoon.   Could you please let her know if Dr. Scotty Neville will order the referral?

## 2020-01-15 ENCOUNTER — TELEPHONE (OUTPATIENT)
Dept: INTERNAL MEDICINE CLINIC | Age: 75
End: 2020-01-15

## 2020-01-15 NOTE — TELEPHONE ENCOUNTER
Returned call to pt regarding message received. Left detailed message on personal VM advising of the information listed below found within the chart. Encouraged pt to contact the office back if she has additional questions or concerns.      Office note 07/2015 states: Plans to see Dr. Fran Neville for colonoscopy  Office note 02/2013 indicates: Last Colonoscopy: 2009 normal Gopi Evangelista)

## 2020-01-15 NOTE — TELEPHONE ENCOUNTER
Patient requesting callback to let her know where she had her last colonoscopy. States Dr. Marsha Walter told her it was time for one, but she does not know where the last one was done -- had a very bad experience that time and does not want to go back to the same doctor.

## 2020-01-16 ENCOUNTER — TELEPHONE (OUTPATIENT)
Dept: INTERNAL MEDICINE CLINIC | Age: 75
End: 2020-01-16

## 2020-01-16 NOTE — TELEPHONE ENCOUNTER
Gisselle Mccall (Self) [phone] Marda Enter)     Pt requesting a call back regarding notes from dr baker's office from 3/15/16.

## 2020-01-16 NOTE — TELEPHONE ENCOUNTER
[facility]  [address] (Self) [phone] (M)      Pt requesting a call back regarding notes from dr baker's office from 3/15/16.

## 2020-01-16 NOTE — TELEPHONE ENCOUNTER
Returned call to pt - verified self and birth date. Pt states she spoke with Hamzah Day at Dr. Jean Carlos Donaldson office and they were faxing the colonoscopy report from 03.15.16    [facility], [address] (Self) [phone] (M)      Pt requesting a call back regarding notes from dr baker's office from 3/15/16.

## 2020-02-24 DIAGNOSIS — L85.3 XEROSIS OF SKIN: ICD-10-CM

## 2020-02-25 RX ORDER — ESCITALOPRAM OXALATE 5 MG/1
5 TABLET ORAL DAILY
Qty: 90 TAB | Refills: 0 | Status: SHIPPED | OUTPATIENT
Start: 2020-02-25 | End: 2020-05-07

## 2020-02-25 RX ORDER — AMMONIUM LACTATE 12 G/100G
CREAM TOPICAL
Qty: 280 G | Refills: 0 | Status: SHIPPED | OUTPATIENT
Start: 2020-02-25 | End: 2020-04-21

## 2020-02-25 NOTE — TELEPHONE ENCOUNTER
Patient is requesting a refill of her lexapro. In review of her chart, her last refill was done in August, 2019 for 1 month supply with no refills. Patient stopped taking her lexapro , but was feeling a 'dip' and recurrence of her depression in the last month. She has been using an old prescription and now needs a refill. She has a follow up appointment on 3/20/2020. Will continue her lexapro 5mg daily dose. Orders Placed This Encounter    escitalopram oxalate (LEXAPRO) 5 mg tablet     Sig: Take 1 Tab by mouth daily. Dispense:  90 Tab     Refill:  0    ammonium lactate (AMLACTIN) 12 % topical cream     Sig: RUB IN TO AFFECTED AREA WELL.      Dispense:  280 g     Refill:  0

## 2020-03-03 ENCOUNTER — OFFICE VISIT (OUTPATIENT)
Dept: INTERNAL MEDICINE CLINIC | Age: 75
End: 2020-03-03

## 2020-03-03 ENCOUNTER — HOSPITAL ENCOUNTER (OUTPATIENT)
Dept: LAB | Age: 75
Discharge: HOME | End: 2020-03-03
Payer: MEDICARE

## 2020-03-03 VITALS
TEMPERATURE: 98.1 F | DIASTOLIC BLOOD PRESSURE: 60 MMHG | RESPIRATION RATE: 16 BRPM | OXYGEN SATURATION: 99 % | BODY MASS INDEX: 24.59 KG/M2 | WEIGHT: 144 LBS | SYSTOLIC BLOOD PRESSURE: 130 MMHG | HEIGHT: 64 IN | HEART RATE: 62 BPM

## 2020-03-03 DIAGNOSIS — R53.83 FATIGUE, UNSPECIFIED TYPE: Primary | ICD-10-CM

## 2020-03-03 DIAGNOSIS — E03.9 ACQUIRED HYPOTHYROIDISM: ICD-10-CM

## 2020-03-03 DIAGNOSIS — G47.33 OSA (OBSTRUCTIVE SLEEP APNEA): ICD-10-CM

## 2020-03-03 DIAGNOSIS — E55.9 VITAMIN D DEFICIENCY: ICD-10-CM

## 2020-03-03 PROCEDURE — 36415 COLL VENOUS BLD VENIPUNCTURE: CPT

## 2020-03-03 PROCEDURE — 84443 ASSAY THYROID STIM HORMONE: CPT

## 2020-03-03 PROCEDURE — 85025 COMPLETE CBC W/AUTO DIFF WBC: CPT

## 2020-03-03 PROCEDURE — 80053 COMPREHEN METABOLIC PANEL: CPT

## 2020-03-03 PROCEDURE — 84439 ASSAY OF FREE THYROXINE: CPT

## 2020-03-03 PROCEDURE — 82306 VITAMIN D 25 HYDROXY: CPT

## 2020-03-03 NOTE — PROGRESS NOTES
75 yo female c/o fatigue for the last month. She has an appointment with Dr. Philip Melendez on 3/12, and wants to have labs drawn prior to that appointment. She has Hypothyroidism, and last lab in Sept was normal.  She gets up in AM, but wants to go back to bed. In Jan, she resumed taking her Lexapro, and exposing herself to more light. Sleep study in Nov 2019 showed \"Significant Sleep Apnea\". She has not been on C-pap, but is going to talk to Dr. Marvel Key tomorrow about this. Bowel fx is one loose stool in AMs; this is right after she wakes up. Stool is not black or bright red. Colonoscopy in March 2015 was normal with next advised for 10 years. No dietary changes. ROSE with mod exertion is unchanged; she has not exercised regularly since an ACL repair a year ago. Never a tobacco user. No known lung issues. No caffeine in afternoons, and alcohol is maybe one drink/week. PE: WNWD WF is alert   BP - 130/60   Carotids - no bruits   Heart - RRR, no murmur   Lungs - clear    Abd - no M,T,O   LEs - no edema; DP pulses = 2+    Imp: Fatigue for past month   HALEY - not on tx - seeing Dr. Marvel Key tomorrow   Hypothroidism   Hx Low Vit D - not on supplement    Plan: Labs today   See Dr. Philip Melendez as scheduled next week  __________________________  Expected course of current diagnosed problem(s) as well as expected progression and possible complications, and desired follow up with provider are discussed with patient. Patient is encouraged to be back in touch with any questions or concerns. Patient expresses understanding of plan of care. Patient is given AVS which includes diagnoses, current medications, vitals.

## 2020-03-03 NOTE — PROGRESS NOTES
Verified name and birth date for privacy precautions. Chart reviewed in preparation for today's visit. Chief Complaint   Patient presents with    Fatigue          There are no preventive care reminders to display for this patient. Wt Readings from Last 3 Encounters:   03/03/20 144 lb (65.3 kg)   11/05/19 146 lb (66.2 kg)   10/28/19 149 lb (67.6 kg)     Temp Readings from Last 3 Encounters:   03/03/20 98.1 °F (36.7 °C) (Oral)   11/05/19 98.5 °F (36.9 °C) (Oral)   10/28/19 96.9 °F (36.1 °C)     BP Readings from Last 3 Encounters:   03/03/20 130/60   11/05/19 146/86   10/28/19 149/90     Pulse Readings from Last 3 Encounters:   03/03/20 62   11/05/19 (!) 50   10/28/19 72         Learning Assessment:  :     Learning Assessment 9/13/2019 5/15/2017 6/22/2015 2/17/2014 4/18/2013   PRIMARY LEARNER Patient Patient Patient Patient Patient   HIGHEST LEVEL OF EDUCATION - PRIMARY LEARNER  > 4 YEARS OF COLLEGE GRADUATED HIGH SCHOOL OR GED > 4 YEARS OF COLLEGE > 4 YEARS OF COLLEGE -   BARRIERS PRIMARY LEARNER NONE - NONE NONE -   CO-LEARNER CAREGIVER No - No No -   PRIMARY LANGUAGE ENGLISH ENGLISH ENGLISH ENGLISH ENGLISH    NEED - - No No -   LEARNER PREFERENCE PRIMARY DEMONSTRATION DEMONSTRATION DEMONSTRATION LISTENING LISTENING     - - - DEMONSTRATION PICTURES   LEARNING SPECIAL TOPICS - - no no -   ANSWERED BY patient  Patient  patient patient patient   RELATIONSHIP SELF SELF SELF SELF SELF   ASSESSMENT COMMENT - - none none -       Depression Screening:  :     3 most recent PHQ Screens 3/3/2020   PHQ Not Done -   Little interest or pleasure in doing things Not at all   Feeling down, depressed, irritable, or hopeless Not at all   Total Score PHQ 2 0       Fall Risk Assessment:  :     Fall Risk Assessment, last 12 mths 3/3/2020   Able to walk? Yes   Fall in past 12 months?  No       Abuse Screening:  :     Abuse Screening Questionnaire 3/3/2020 9/13/2019 5/15/2017 6/22/2015 4/1/2014 2/17/2014   Do you ever feel afraid of your partner? N N N N N N   Are you in a relationship with someone who physically or mentally threatens you? N N N N N N   Is it safe for you to go home?  Y Y Y Y Y Y       Coordination of Care Questionnaire:  :     1) Have you been to an emergency room, urgent care clinic since your last visit? no   Hospitalized since your last visit? no             2) Have you seen or consulted any other health care providers outside of [address] [place] [address] since your last visit? no  (Include any pap smears or colon screenings in this section.)    3) Do you have an Advance Directive on file? no        ------------------------------------------------

## 2020-03-04 ENCOUNTER — TELEPHONE (OUTPATIENT)
Dept: INTERNAL MEDICINE CLINIC | Age: 75
End: 2020-03-04

## 2020-03-04 DIAGNOSIS — E03.9 ACQUIRED HYPOTHYROIDISM: Primary | ICD-10-CM

## 2020-03-04 LAB
25(OH)D3+25(OH)D2 SERPL-MCNC: 22.9 NG/ML (ref 30–100)
ALBUMIN SERPL-MCNC: 4.5 G/DL (ref 3.7–4.7)
ALBUMIN/GLOB SERPL: 2.3 {RATIO} (ref 1.2–2.2)
ALP SERPL-CCNC: 64 IU/L (ref 39–117)
ALT SERPL-CCNC: 14 IU/L (ref 0–32)
AST SERPL-CCNC: 23 IU/L (ref 0–40)
BASOPHILS # BLD AUTO: 0 X10E3/UL (ref 0–0.2)
BASOPHILS NFR BLD AUTO: 1 %
BILIRUB SERPL-MCNC: 0.4 MG/DL (ref 0–1.2)
BUN SERPL-MCNC: 14 MG/DL (ref 8–27)
BUN/CREAT SERPL: 18 (ref 12–28)
CALCIUM SERPL-MCNC: 10 MG/DL (ref 8.7–10.3)
CHLORIDE SERPL-SCNC: 102 MMOL/L (ref 96–106)
CO2 SERPL-SCNC: 25 MMOL/L (ref 20–29)
CREAT SERPL-MCNC: 0.76 MG/DL (ref 0.57–1)
EOSINOPHIL # BLD AUTO: 0.1 X10E3/UL (ref 0–0.4)
EOSINOPHIL NFR BLD AUTO: 2 %
ERYTHROCYTE [DISTWIDTH] IN BLOOD BY AUTOMATED COUNT: 13 % (ref 11.7–15.4)
GLOBULIN SER CALC-MCNC: 2 G/DL (ref 1.5–4.5)
GLUCOSE SERPL-MCNC: 83 MG/DL (ref 65–99)
HCT VFR BLD AUTO: 45.8 % (ref 34–46.6)
HGB BLD-MCNC: 14.9 G/DL (ref 11.1–15.9)
IMM GRANULOCYTES # BLD AUTO: 0 X10E3/UL (ref 0–0.1)
IMM GRANULOCYTES NFR BLD AUTO: 0 %
LYMPHOCYTES # BLD AUTO: 1.6 X10E3/UL (ref 0.7–3.1)
LYMPHOCYTES NFR BLD AUTO: 35 %
MCH RBC QN AUTO: 29.5 PG (ref 26.6–33)
MCHC RBC AUTO-ENTMCNC: 32.5 G/DL (ref 31.5–35.7)
MCV RBC AUTO: 91 FL (ref 79–97)
MONOCYTES # BLD AUTO: 0.5 X10E3/UL (ref 0.1–0.9)
MONOCYTES NFR BLD AUTO: 10 %
NEUTROPHILS # BLD AUTO: 2.4 X10E3/UL (ref 1.4–7)
NEUTROPHILS NFR BLD AUTO: 52 %
PLATELET # BLD AUTO: 231 X10E3/UL (ref 150–450)
POTASSIUM SERPL-SCNC: 4.9 MMOL/L (ref 3.5–5.2)
PROT SERPL-MCNC: 6.5 G/DL (ref 6–8.5)
RBC # BLD AUTO: 5.05 X10E6/UL (ref 3.77–5.28)
SODIUM SERPL-SCNC: 141 MMOL/L (ref 134–144)
T4 FREE SERPL-MCNC: 1.67 NG/DL (ref 0.82–1.77)
TSH SERPL DL<=0.005 MIU/L-ACNC: 0.3 UIU/ML (ref 0.45–4.5)
WBC # BLD AUTO: 4.6 X10E3/UL (ref 3.4–10.8)

## 2020-03-04 RX ORDER — LEVOTHYROXINE SODIUM 50 UG/1
50 TABLET ORAL
Qty: 90 TAB | Refills: 1 | Status: SHIPPED | OUTPATIENT
Start: 2020-03-04 | End: 2020-06-18 | Stop reason: DRUGHIGH

## 2020-03-04 NOTE — PROGRESS NOTES
Reviewed lab results with patient by phone:   TSH too suppressed     Plan: Change Levothyroxine dose to 50 mcg     Recheck lab in 6 weeks     Vit D level too low    Plan: OTC Vit D3 1000 units daily    She reports she \"slept through\" / past her appointment with Dr. Sol Chanel today, and will have to re-schedule.     She will see Dr. Atul Gilmore here on 3/12

## 2020-03-09 ENCOUNTER — OFFICE VISIT (OUTPATIENT)
Dept: SLEEP MEDICINE | Age: 75
End: 2020-03-09

## 2020-03-09 VITALS
WEIGHT: 145 LBS | OXYGEN SATURATION: 98 % | BODY MASS INDEX: 24.75 KG/M2 | HEIGHT: 64 IN | SYSTOLIC BLOOD PRESSURE: 129 MMHG | DIASTOLIC BLOOD PRESSURE: 84 MMHG | HEART RATE: 69 BPM

## 2020-03-09 DIAGNOSIS — E07.9 THYROID DISORDER: ICD-10-CM

## 2020-03-09 DIAGNOSIS — G47.33 OBSTRUCTIVE SLEEP APNEA (ADULT) (PEDIATRIC): Primary | ICD-10-CM

## 2020-03-09 NOTE — PATIENT INSTRUCTIONS
[address] [facility] [place]. [place], [address]  Tel.  [phone]  Fax. [address] [facility]  [place], [address] [facility]  Tel.  [phone]  Fax. [phone] [address]  Tel.  [phone]  Fax. [phone]     Learning About CPAP for Sleep Apnea  What is CPAP? CPAP is a small machine that you use at home every night while you sleep. It increases air pressure in your throat to keep your airway open. When you have sleep apnea, this can help you sleep better so you feel much better. CPAP stands for \"continuous positive airway pressure. \"  The CPAP machine will have one of the following:  · A mask that covers your nose and mouth  · Prongs that fit into your nose  · A mask that covers your nose only, the most common type. This type is called NCPAP. The N stands for \"nasal.\"  Why is it done? CPAP is usually the best treatment for obstructive sleep apnea. It is the first treatment choice and the most widely used. Your doctor may suggest CPAP if you have:  · Moderate to severe sleep apnea. · Sleep apnea and coronary artery disease (CAD) or heart failure. How does it help? · CPAP can help you have more normal sleep, so you feel less sleepy and more alert during the daytime. · CPAP may help keep heart failure or other heart problems from getting worse. · NCPAP may help lower your blood pressure. · If you use CPAP, your bed partner may also sleep better because you are not snoring or restless. What are the side effects? Some people who use CPAP have:  · A dry or stuffy nose and a sore throat. · Irritated skin on the face. · Sore eyes. · Bloating. If you have any of these problems, work with your doctor to fix them. Here are some things you can try:  · Be sure the mask or nasal prongs fit well. · See if your doctor can adjust the pressure of your CPAP. · If your nose is dry, try a humidifier.   · If your nose is runny or stuffy, try decongestant medicine or a steroid nasal spray. If these things do not help, you might try a different type of machine. Some machines have air pressure that adjusts on its own. Others have air pressures that are different when you breathe in than when you breathe out. This may reduce discomfort caused by too much pressure in your nose. Where can you learn more? Go to "[employer].be  Enter Angie Dey in the search box to learn more about \"Learning About CPAP for Sleep Apnea. \"   © 7889-6783 Healthwise, Incorporated. Care instructions adapted under license by [facility] [employer] (which disclaims liability or warranty for this information). This care instruction is for use with your licensed healthcare professional. If you have questions about a medical condition or this instruction, always ask your healthcare professional. [address] any warranty or liability for your use of this information. Content Version: 4.9.24189; Last Revised: January 11, 2010  PROPER SLEEP HYGIENE    What to avoid  · Do not have drinks with caffeine, such as coffee or black tea, for 8 hours before bed. · Do not smoke or use other types of tobacco near bedtime. Nicotine is a stimulant and can keep you awake. · Avoid drinking alcohol late in the evening, because it can cause you to wake in the middle of the night. · Do not eat a big meal close to bedtime. If you are hungry, eat a light snack. · Do not drink a lot of water close to bedtime, because the need to urinate may wake you up during the night. · Do not read or watch TV in bed. Use the bed only for sleeping and sexual activity. What to try  · Go to bed at the same time every night, and wake up at the same time every morning. Do not take naps during the day. · Keep your bedroom quiet, dark, and cool. · Get regular exercise, but not within 3 to 4 hours of your bedtime. .  · Sleep on a comfortable pillow and mattress.   · If watching the clock makes you anxious, turn it facing away from you so you cannot see the time. · If you worry when you lie down, start a worry book. Well before bedtime, write down your worries, and then set the book and your concerns aside. · Try meditation or other relaxation techniques before you go to bed. · If you cannot fall asleep, get up and go to another room until you feel sleepy. Do something relaxing. Repeat your bedtime routine before you go to bed again. · Make your house quiet and calm about an hour before bedtime. Turn down the lights, turn off the TV, log off the computer, and turn down the volume on music. This can help you relax after a busy day. Drowsy Driving: [facility] cites drowsiness as a causing factor in more than 519,451 police reported crashes annually, resulting in 76,000 injuries and 1,500 deaths. Other surveys suggest 55% of people polled have driven while drowsy in the past year, 23% had fallen asleep but not crashed, 3% crashed, and 2% had and accident due to drowsy driving. Who is at risk? Young Drivers: One study of drowsy driving accidents states that 55% of the drivers were under 25 years. Of those, 75% were male. Shift Workers and Travelers: People who work overnight or travel across time zones frequently are at higher risk of experiencing Circadian Rhythm Disorders. They are trying to work and function when their body is programed to sleep. Sleep Deprived: Lack of sleep has a serious impact on your ability to pay attention or focus on a task. Consistently getting less than the average of 8 hours your body needs creates partial or cumulative sleep deprivation. Untreated Sleep Disorders: Sleep Apnea, Narcolepsy, R.L.S., and other sleep disorders (untreated) prevent a person from getting enough restful sleep. This leads to excessive daytime sleepiness and increases the risk for drowsy driving accidents by up to 7 times.   Medications / Alcohol: Even over the counter medications can cause drowsiness. Medications that impair a drivers attention should have a warning label. Alcohol naturally makes you sleepy and on its own can cause accidents. Combined with excessive drowsiness its effects are amplified. Signs of Drowsy Driving:   * You don't remember driving the last few miles   * You may drift out of your stephanie   * You are unable to focus and your thoughts wander   * You may yawn more often than normal   * You have difficulty keeping your eyes open / nodding off   * Missing traffic signs, speeding, or tailgating  Prevention-   Good sleep hygiene, lifestyle and behavioral choices have the most impact on drowsy driving. There is no substitute for sleep and the average person requires 8 hours nightly. If you find yourself driving drowsy, stop and sleep. Consider the sleep hygiene tips provided during your visit as well. Medication Refill Policy: Refills for all medications require 1 week advance notice. Please have your pharmacy fax a refill request. We are unable to fax, or call in \"controled substance\" medications and you will need to pick these prescriptions up from our office. Zounds Activation    Thank you for requesting access to Zounds. Please follow the instructions below to securely access and download your online medical record. Zounds allows you to send messages to your doctor, view your test results, renew your prescriptions, schedule appointments, and more. How Do I Sign Up? 1. In your internet browser, go to [email or web address]. 2. Click on the First Time User? Click Here link in the Sign In box. You will see the New Member Sign Up page. 3. Enter your Zounds Access Code exactly as it appears below. You will not need to use this code after youve completed the sign-up process. If you do not sign up before the expiration date, you must request a new code.     Zounds Access Code: [email or web address]  Expires: 2020 11:40 AM (This is the date your Lumiary access code will )    4. Enter the last four digits of your Social Security Number (xxxx) and Date of Birth (mm/dd/yyyy) as indicated and click Submit. You will be taken to the next sign-up page. 5. Create a Lumiary ID. This will be your Lumiary login ID and cannot be changed, so think of one that is secure and easy to remember. 6. Create a Lumiary password. You can change your password at any time. 7. Enter your Password Reset Question and Answer. This can be used at a later time if you forget your password. 8. Enter your e-mail address. You will receive e-mail notification when new information is available in [address] 9. Click Sign Up. You can now view and download portions of your medical record. 10. Click the Download Summary menu link to download a portable copy of your medical information. Additional Information    If you have questions, please call [phone]. Remember, Lumiary is NOT to be used for urgent needs. For medical emergencies, dial 911.

## 2020-03-09 NOTE — PROGRESS NOTES
[address] [facility] [address] [place]. [place], [address]   Tel.  [phone]   Fax. [address]   [place], [address] [facility]   Tel.  [phone]   Fax. [phone] [address]   Tel.  [phone]   Fax. [phone]       Subjective:   Kristen Jolly is a 76 y.o.  femaile seen for a sleep disorder follow-up, last seen by Dr. Marvel Key on 6/12/2019, prior notes reviewed in detail. In lab sleep test 10/2019 showed AHI of 12.9/hr with a lowest SaO2 of 87%. She did not want to start PAP therapy at that time. She is here today to discuss sleep therapy options including a PAP device. She continues to experience excessive daytime sleepiness and it has worsened. The sleepiness is describes as being moderate. She has been taking naps during the day. She has been snoring for a number of years and her snoring has worsened, it is exacerbated by sleeping supine. [place] Sleepiness Score: 6 and Modified F.O.S.Q. Score Total / 2: 13 which reflects moderate daytime sleepiness. Allergies   Allergen Reactions    Talwin [Pentazocine Lactate] Unknown (comments)     Altered sensorium    Amoxicillin Other (comments)     Diarrhea, stomach upset    Darvon [Propoxyphene] Unknown (comments)    Diamox Other (comments)     migraine    Morphine Other (comments)     Pt states she is very sensitive to all narcotics, feels like she will pass out    Simvastatin Other (comments)    Zithromax [Azithromycin] Other (comments)     depression       She has a current medication list which includes the following prescription(s): levothyroxine, escitalopram oxalate, ammonium lactate, estradiol, and loratadine. .      She  has a past medical history of Adverse effect of anesthesia, Depression, Headache(784.0), Hypercholesterolemia, Hypothyroid, Nephrolithiasis, and Scoliosis. Sleep Review of Systems: notable for Negative difficulty falling asleep;  Positive awakenings at night; Negative early morning headaches; Negative memory problems; Negative concentration issues; Negative chest pain; Negative shortness of breath; Negative significant joint pain at night; Negative significant muscle pain at night; Negative rashes or itching; Negative heartburn; Negative significant mood issues; 3-4 afternoon naps per week    Objective:     Visit Vitals  /84 (BP 1 Location: Left arm, BP Patient Position: Sitting)   Pulse 69   Ht 5' 3.5\" (1.613 m)   Wt 145 lb (65.8 kg)   SpO2 98%   BMI 25.28 kg/m²          General:   Alert, oriented, not in acute distress   Eyes:  Anicteric Sclerae; no obvious strabismus   Nose:  No obvious nasal septum deviation    Oropharynx:   Mallampati score 4, thick tongue base, uvula not seen due to low-lying soft palate, narrow tonsilo-pharyngeal pilars   Neck:   Midline trachea   Chest/Lungs:  Symmetrical lung expansion, clear lung fields on auscultation    CVS:  Normal rate, regular rhythm,  no JVD   Extremities:  No obvious rashes, no edema    Neuro:  No focal deficits; No obvious tremor    Psych:  Normal affect,  normal countenance       Assessment:       ICD-10-CM ICD-9-CM    1. Obstructive sleep apnea (adult) (pediatric) G47.33 327.23 AMB SUPPLY ORDER   2. Thyroid disorder E07.9 246.9    3. Adult BMI 25.0-25.9 kg/sq m Z68.25 V85.21        Patient has a history and examination consistent with the diagnosis of sleep apnea. Plan:     Follow-up and Dispositions    · Return in about 8 weeks (around 5/4/2020). * She was provided information on sleep apnea including corresponding risk factors and the importance of proper treatment. * Treatment options were reviewed in detail. she would like to proceed with PAP therapy. Patient will be seen in follow-up in 6-8 weeks after PAP setup to gauge treatment response and adherence to therapy.       Orders Placed This Encounter    AMB SUPPLY ORDER     Diagnosis: (G47.33) HALEY (obstructive sleep apnea)  (primary encounter diagnosis)       Respironics Dreamstation APAP 4 cm H20 to 12 cm H20    Heated Humidifier  Mode, chintan access to sleep center  Length of need 99    Mask-fitting evaluation and mask per patient preference     Nasal Pillows Combo Mask (Replace) 2 per month.  Nasal Pillows (Replace) 2 per month.  Nasal Cushion (Replace) 2 per month.  Nasal Interface Mask 1 every 3 months.  Full Face Mask 1 every 3 months.  Full Face Mask Cushion 1 per month.  Pos Airway pressure chin strap   Headgear 1 every 6 months.  Tubing with heating element 1 every 3 months.  Filter(s) Disposable 2 per month.  Filter(s) Non-Disposable 1 every 6 months. .   [address] [facility] for Humidifier (Replace) 1 every 6 months. MADELIN Sepulveda; NPI: [id number]    Electronically signed. Date:- 03/09/20       * The patient was counseled regarding proper sleep hygiene, with emphasis on ensuring sufficient total sleep time; safe driving and the benefits of exercise and weight loss. * All of her questions were addressed. 2. Thyroid Disorder - continue on her current regimen. Medications were recently changed. 3. Encouraged continued weight management program through appropriate diet and exercise regimen as significant weight reduction has been shown to reduce severity of obstructive sleep apnea. Office visit exceeded 25 minutes with counseling and direction of care regarding sleep apnea therapy alternatives taking up more than 50% of the allotted time. Patient's phone number [phone] was reviewed and confirmed for accuracy. She gives permission for messages regarding results and appointments to be left at that number. MADELNI Sepulveda, [address] [place] [address]  Electronically signed.  09/30/19

## 2020-03-12 ENCOUNTER — OFFICE VISIT (OUTPATIENT)
Dept: INTERNAL MEDICINE CLINIC | Age: 75
End: 2020-03-12

## 2020-03-12 VITALS
BODY MASS INDEX: 25.1 KG/M2 | WEIGHT: 147 LBS | TEMPERATURE: 98 F | OXYGEN SATURATION: 97 % | HEART RATE: 67 BPM | DIASTOLIC BLOOD PRESSURE: 68 MMHG | SYSTOLIC BLOOD PRESSURE: 130 MMHG | RESPIRATION RATE: 16 BRPM | HEIGHT: 64 IN

## 2020-03-12 DIAGNOSIS — E78.00 HYPERCHOLESTEREMIA: ICD-10-CM

## 2020-03-12 DIAGNOSIS — F32.A DEPRESSION, UNSPECIFIED DEPRESSION TYPE: ICD-10-CM

## 2020-03-12 DIAGNOSIS — Z79.899 ENCOUNTER FOR LONG-TERM (CURRENT) USE OF MEDICATIONS: ICD-10-CM

## 2020-03-12 DIAGNOSIS — E55.9 VITAMIN D DEFICIENCY: ICD-10-CM

## 2020-03-12 DIAGNOSIS — E03.9 HYPOTHYROIDISM, ADULT: Primary | ICD-10-CM

## 2020-03-12 NOTE — PROGRESS NOTES
Verified name and birth date for privacy precautions. Chart reviewed in preparation for today's visit. Chief Complaint   Patient presents with    Thyroid Problem     follow up     Cholesterol Problem          There are no preventive care reminders to display for this patient. Wt Readings from Last 3 Encounters:   03/12/20 147 lb (66.7 kg)   03/09/20 145 lb (65.8 kg)   03/03/20 144 lb (65.3 kg)     Temp Readings from Last 3 Encounters:   03/12/20 98 °F (36.7 °C) (Oral)   03/03/20 98.1 °F (36.7 °C) (Oral)   11/05/19 98.5 °F (36.9 °C) (Oral)     BP Readings from Last 3 Encounters:   03/12/20 130/68   03/09/20 129/84   03/03/20 130/60     Pulse Readings from Last 3 Encounters:   03/12/20 67   03/09/20 69   03/03/20 62         Learning Assessment:  :     Learning Assessment 9/13/2019 5/15/2017 6/22/2015 2/17/2014 4/18/2013   PRIMARY LEARNER Patient Patient Patient Patient Patient   HIGHEST LEVEL OF EDUCATION - PRIMARY LEARNER  > 4 YEARS OF COLLEGE GRADUATED HIGH SCHOOL OR GED > 4 YEARS OF COLLEGE > 4 YEARS OF COLLEGE -   BARRIERS PRIMARY LEARNER NONE - NONE NONE -   CO-LEARNER CAREGIVER No - No No -   PRIMARY LANGUAGE ENGLISH ENGLISH ENGLISH ENGLISH ENGLISH    NEED - - No No -   LEARNER PREFERENCE PRIMARY DEMONSTRATION DEMONSTRATION DEMONSTRATION LISTENING LISTENING     - - - DEMONSTRATION PICTURES   LEARNING SPECIAL TOPICS - - no no -   ANSWERED BY patient  Patient  patient patient patient   RELATIONSHIP SELF SELF SELF SELF SELF   ASSESSMENT COMMENT - - none none -       Depression Screening:  :     3 most recent PHQ Screens 3/3/2020   PHQ Not Done -   Little interest or pleasure in doing things Not at all   Feeling down, depressed, irritable, or hopeless Not at all   Total Score PHQ 2 0       Fall Risk Assessment:  :     Fall Risk Assessment, last 12 mths 3/3/2020   Able to walk? Yes   Fall in past 12 months?  No       Abuse Screening:  :     Abuse Screening Questionnaire 3/3/2020 9/13/2019 5/15/2017 6/22/2015 4/1/2014 2/17/2014   Do you ever feel afraid of your partner? N N N N N N   Are you in a relationship with someone who physically or mentally threatens you? N N N N N N   Is it safe for you to go home?  Y Y Y Y Y Y       Coordination of Care Questionnaire:  :     1) Have you been to an emergency room, urgent care clinic since your last visit? no   Hospitalized since your last visit? no             2) Have you seen or consulted any other health care providers outside of [address] [place] [address] since your last visit? no  (Include any pap smears or colon screenings in this section.)    3) Do you have an Advance Directive on file? no      ------------------------------------------------

## 2020-03-12 NOTE — PROGRESS NOTES
Subjective:      Bella Brown is a 76 y.o. female who presents today for follow up of her hypothyroid, hyperlipidemia and depression. She follows with Dr. Stevie Sacks for her HALEY. She was diagnosed with HALEY in 6/2019, but declined use of CPAP. She has been experiencing more daytime somnolence. She was started on APAP on 3/9/2020. She was seen by Robert Vásquez NP on 3/3/2020 for fatigue. Labs done on 3/3/2020 showed that her TSH was suppressed. Her levothyroxine dose was decreased to 50mcg daily. She was also started on vitamin D supplement daily. Patient had heart CT test done 10/16/2019 due do intolerance of simvastatin and didn't want to use another statin. Her calcium score is 0. Patient Active Problem List   Diagnosis Code    Acquired hypothyroidism E03.9    Hypercholesteremia E78.00    History of colonoscopy Z98.890    Environmental allergies Z91.09    Xerosis of skin L85.3    Vitamin D deficiency E55.9    Osteopenia M85.80    Adjustment disorder F43.20    Migraine headache G43.909    Family history of breast cancer in first degree relative Z80.3    H/O screening mammography Z92.89    HALEY (obstructive sleep apnea) G47.33     Current Outpatient Medications   Medication Sig Dispense Refill    levothyroxine (SYNTHROID) 50 mcg tablet Take 1 Tab by mouth Daily (before breakfast). 90 Tab 1    escitalopram oxalate (LEXAPRO) 5 mg tablet Take 1 Tab by mouth daily. 90 Tab 0    ammonium lactate (AMLACTIN) 12 % topical cream RUB IN TO AFFECTED AREA WELL. 280 g 0    loratadine (CLARITIN) 10 mg tablet Take 10 mg by mouth.  estradiol (ESTRACE) 0.01 % (0.1 mg/gram) vaginal cream Indications: pt has not started Rx at this time. Review of Systems    Pertinent items are noted in HPI.      Objective:     Visit Vitals  /68 (BP 1 Location: Left arm, BP Patient Position: Sitting)   Pulse 67   Temp 98 °F (36.7 °C) (Oral)   Resp 16   Ht 5' 3.5\" (1.613 m)   Wt 147 lb (66.7 kg) SpO2 97%   BMI 25.63 kg/m²     General appearance: alert, cooperative, no distress, appears stated age    Assessment/Plan:     1. Hypothyroidism, adult  -dose adjusted 2 weeks ago at her visit with Shane Majano NP.   -script given to have her tsh rechecked in may, 2020.      - TSH 3RD GENERATION; Future  - T4, FREE; Future    2. Depression, unspecified depression type  -stable on lexapro 5mg daily    3. Vitamin D deficiency  -taking vitamin D3 1000 units daily. 4. Hypercholesteremia  -encouraged maintaining low fat diet and regular exercise  -calcium score of 0 on heart ct in 10/2019.    -recommended repeat of test in 2 year. 5. Encounter for long-term (current) use of medications    Orders Placed This Encounter    TSH 3RD GENERATION     Standing Status:   Future     Standing Expiration Date:   8/12/2020    T4, FREE     Standing Status:   Future     Standing Expiration Date:   8/12/2020         Follow-up Disposition:     Follow up in 6 months     Return if symptoms worsen or fail to improve. Advised patient to call back or return to office if symptoms worsen/change/persist.     Discussed expected course/resolution/complications of diagnosis in detail with patient. Medication risks/benefits/costs/interactions/alternatives discussed with patient. Patient was given an after visit summary which includes diagnoses, current medications, & vitals. Patient expressed understanding with the diagnosis and plan.

## 2020-03-13 ENCOUNTER — DOCUMENTATION ONLY (OUTPATIENT)
Dept: SLEEP MEDICINE | Age: 75
End: 2020-03-13

## 2020-04-16 ENCOUNTER — TELEPHONE (OUTPATIENT)
Dept: INTERNAL MEDICINE CLINIC | Age: 75
End: 2020-04-16

## 2020-04-16 NOTE — TELEPHONE ENCOUNTER
Returned patient's call. She continues to experience fatigue and feeling \"depressed\" although not as bad in past.  In early March, TSH was too low, and her medication dose was lowered. She was c/o fatigue then, too. Her Vit D was lower than normal, and she was advised to take a daily supplement (now taking 1000 international units daily)  . She has HALEY, and had not in the past wanted to use C-pap. She is now considering using it as her daytime drowsiness and snoring have continued to worsen. She is taking 2 hour naps daily. She is exposing herself to light, exercising, etc.     She is on Lexapro 5 mg daily. She last saw Dr. Ann Cross 3/12 with next visit advised for 6 months - this appointment has not been made.       Plan: Increase Lexapro to 10 mg ( she will use her 5 mg tablets, and call for increase to 10 mg if she sees improvement in sx)   If no improvement with Lexapro change, will have her come in for TSH draw

## 2020-04-16 NOTE — TELEPHONE ENCOUNTER
Returned call to patient who continues to c/o fatigue. When seen on 3/3, TSH was too suppressed so thyroid med dose was changed; Vit D was low, so she was advised to take daily Vit D. She is on Lexapro 5 mg daily (adjustment disorder).

## 2020-04-21 DIAGNOSIS — L85.3 XEROSIS OF SKIN: ICD-10-CM

## 2020-04-21 RX ORDER — AMMONIUM LACTATE 12 G/100G
CREAM TOPICAL
Qty: 280 G | Refills: 0 | Status: SHIPPED | OUTPATIENT
Start: 2020-04-21 | End: 2020-07-31

## 2020-04-30 ENCOUNTER — TELEPHONE (OUTPATIENT)
Dept: SLEEP MEDICINE | Age: 75
End: 2020-04-30

## 2020-04-30 NOTE — TELEPHONE ENCOUNTER
Patient consents to virtual visit but unsure until appointment time of method (video or audio). Will confirm closer to appointment.

## 2020-05-04 ENCOUNTER — VIRTUAL VISIT (OUTPATIENT)
Dept: SLEEP MEDICINE | Age: 75
End: 2020-05-04

## 2020-05-04 ENCOUNTER — TELEPHONE (OUTPATIENT)
Dept: SLEEP MEDICINE | Age: 75
End: 2020-05-04

## 2020-05-04 ENCOUNTER — DOCUMENTATION ONLY (OUTPATIENT)
Dept: SLEEP MEDICINE | Age: 75
End: 2020-05-04

## 2020-05-04 VITALS — WEIGHT: 145 LBS | HEIGHT: 63 IN | BODY MASS INDEX: 25.69 KG/M2

## 2020-05-04 DIAGNOSIS — G47.33 OBSTRUCTIVE SLEEP APNEA (ADULT) (PEDIATRIC): Primary | ICD-10-CM

## 2020-05-04 DIAGNOSIS — E07.9 THYROID DISORDER: ICD-10-CM

## 2020-05-04 NOTE — PROGRESS NOTES
Faxed PAP orders to [facility] and left voicemail to call to scheduled 3-month return visit with Hollie Acuna NP.

## 2020-05-04 NOTE — PATIENT INSTRUCTIONS
[address] [facility]., [place]. [address] [facility], [address] Tel.  [phone] Fax. 100 [facility] [address] [employer], [address] [facility] Tel.  [phone] Fax. [phone] [address] [employer] [place]el Franco Tel.  [phone] Fax. [phone] Learning About CPAP for Sleep Apnea What is CPAP? CPAP is a small machine that you use at home every night while you sleep. It increases air pressure in your throat to keep your airway open. When you have sleep apnea, this can help you sleep better so you feel much better. CPAP stands for \"continuous positive airway pressure. \" The CPAP machine will have one of the following: · A mask that covers your nose and mouth · Prongs that fit into your nose · A mask that covers your nose only, the most common type. This type is called NCPAP. The N stands for \"nasal.\" Why is it done? CPAP is usually the best treatment for obstructive sleep apnea. It is the first treatment choice and the most widely used. Your doctor may suggest CPAP if you have: · Moderate to severe sleep apnea. · Sleep apnea and coronary artery disease (CAD) or heart failure. How does it help? · CPAP can help you have more normal sleep, so you feel less sleepy and more alert during the daytime. · CPAP may help keep heart failure or other heart problems from getting worse. · NCPAP may help lower your blood pressure. · If you use CPAP, your bed partner may also sleep better because you are not snoring or restless. What are the side effects? Some people who use CPAP have: · A dry or stuffy nose and a sore throat. · Irritated skin on the face. · Sore eyes. · Bloating. If you have any of these problems, work with your doctor to fix them. Here are some things you can try: · Be sure the mask or nasal prongs fit well. · See if your doctor can adjust the pressure of your CPAP. · If your nose is dry, try a humidifier. · If your nose is runny or stuffy, try decongestant medicine or a steroid nasal spray. If these things do not help, you might try a different type of machine. Some machines have air pressure that adjusts on its own. Others have air pressures that are different when you breathe in than when you breathe out. This may reduce discomfort caused by too much pressure in your nose. Where can you learn more? Go to [employer] Enter A823 in the search box to learn more about \"Learning About CPAP for Sleep Apnea. \"  
© 4795-6431 Healthwise, Incorporated. Care instructions adapted under license by [facility] (which disclaims liability or warranty for this information). This care instruction is for use with your licensed healthcare professional. If you have questions about a medical condition or this instruction, always ask your healthcare professional. [address] any warranty or liability for your use of this information. Content Version: 1.7.35496; Last Revised: January 11, 2010 PROPER SLEEP HYGIENE What to avoid · Do not have drinks with caffeine, such as coffee or black tea, for 8 hours before bed. · Do not smoke or use other types of tobacco near bedtime. Nicotine is a stimulant and can keep you awake. · Avoid drinking alcohol late in the evening, because it can cause you to wake in the middle of the night. · Do not eat a big meal close to bedtime. If you are hungry, eat a light snack. · Do not drink a lot of water close to bedtime, because the need to urinate may wake you up during the night. · Do not read or watch TV in bed. Use the bed only for sleeping and sexual activity. What to try · Go to bed at the same time every night, and wake up at the same time every morning. Do not take naps during the day. · Keep your bedroom quiet, dark, and cool. · Get regular exercise, but not within 3 to 4 hours of your bedtime. Louise Valderrama · Sleep on a comfortable pillow and mattress. · If watching the clock makes you anxious, turn it facing away from you so you cannot see the time. · If you worry when you lie down, start a worry book. Well before bedtime, write down your worries, and then set the book and your concerns aside. · Try meditation or other relaxation techniques before you go to bed. · If you cannot fall asleep, get up and go to another room until you feel sleepy. Do something relaxing. Repeat your bedtime routine before you go to bed again. · Make your house quiet and calm about an hour before bedtime. Turn down the lights, turn off the TV, log off the computer, and turn down the volume on music. This can help you relax after a busy day. Drowsy Driving: The [facility] cites drowsiness as a causing factor in more than 246,516 police reported crashes annually, resulting in 76,000 injuries and 1,500 deaths. Other surveys suggest 55% of people polled have driven while drowsy in the past year, 23% had fallen asleep but not crashed, 3% crashed, and 2% had and accident due to drowsy driving. Who is at risk? Young Drivers: One study of drowsy driving accidents states that 55% of the drivers were under 25 years. Of those, 75% were male. Shift Workers and Travelers: People who work overnight or travel across time zones frequently are at higher risk of experiencing Circadian Rhythm Disorders. They are trying to work and function when their body is programed to sleep. Sleep Deprived: Lack of sleep has a serious impact on your ability to pay attention or focus on a task. Consistently getting less than the average of 8 hours your body needs creates partial or cumulative sleep deprivation.   
Untreated Sleep Disorders: Sleep Apnea, Narcolepsy, R.L.S., and other sleep disorders (untreated) prevent a person from getting enough restful sleep. This leads to excessive daytime sleepiness and increases the risk for drowsy driving accidents by up to 7 times. Medications / Alcohol: Even over the counter medications can cause drowsiness. Medications that impair a drivers attention should have a warning label. Alcohol naturally makes you sleepy and on its own can cause accidents. Combined with excessive drowsiness its effects are amplified. Signs of Drowsy Driving: * You don't remember driving the last few miles * You may drift out of your stephanie * You are unable to focus and your thoughts wander * You may yawn more often than normal 
 * You have difficulty keeping your eyes open / nodding off * Missing traffic signs, speeding, or tailgating Prevention-  
Good sleep hygiene, lifestyle and behavioral choices have the most impact on drowsy driving. There is no substitute for sleep and the average person requires 8 hours nightly. If you find yourself driving drowsy, stop and sleep. Consider the sleep hygiene tips provided during your visit as well. Medication Refill Policy: Refills for all medications require 1 week advance notice. Please have your pharmacy fax a refill request. We are unable to fax, or call in \"controled substance\" medications and you will need to pick these prescriptions up from our office. [employer] Activation Thank you for requesting access to [employer]. Please follow the instructions below to securely access and download your online medical record. [employer] allows you to send messages to your doctor, view your test results, renew your prescriptions, schedule appointments, and more. How Do I Sign Up? 1. In your internet browser, go to [email or web address] 2. Click on the First Time User? Click Here link in the Sign In box. You will see the New Member Sign Up page. 3. Enter your [employer] Access Code exactly as it appears below.  You will not need to use this code after youve completed the sign-up process. If you do not sign up before the expiration date, you must request a new code. [employer] Access Code: [email or web address] Expires: 2020  9:25 AM (This is the date your [employer] access code will ) 4. Enter the last four digits of your Social Security Number (xxxx) and Date of Birth (mm/dd/yyyy) as indicated and click Submit. You will be taken to the next sign-up page. 5. Create a [employer] ID. This will be your [employer] login ID and cannot be changed, so think of one that is secure and easy to remember. 6. Create a [employer] password. You can change your password at any time. 7. Enter your Password Reset Question and Answer. This can be used at a later time if you forget your password. 8. Enter your e-mail address. You will receive e-mail notification when new information is available in [address]. 9. Click Sign Up. You can now view and download portions of your medical record. 10. Click the Download Summary menu link to download a portable copy of your medical information. Additional Information If you have questions, please call [phone]. Remember, [employer] is NOT to be used for urgent needs. For medical emergencies, dial 911.

## 2020-05-04 NOTE — PROGRESS NOTES
[address] [facility] [place]. [place], [address]   Tel.  [phone]   Fax. [address] [facility] [address] [facility] [address]   [place], [address] [facility]   Tel.  [phone]   Fax. [phone] [address] [facility] Russel Franco   Tel.  [phone]   Fax. [address] Vidhi Kruse is a 76 y.o. female who was seen by synchronous (real-time) audio-video technology on 5/4/2020. Consent:  She and/or her healthcare decision maker is aware that this patient-initiated Telehealth encounter is a billable service, with coverage as determined by her insurance carrier. She is aware that she may receive a bill and has provided verbal consent to proceed: Yes    I was at home while conducting this encounter. Patient verified with photo in chart. Subjective:     Christos Madera is seen for a sleep disorder follow-up, last seen by me on 3/9/2020, previously seen by Dr. Loan Koehler on 6/12/2019, prior notes reviewed in detail. In lab sleep test 10/2019 showed AHI of 12.9/hr with a lowest SaO2 of 87%. She did not want to start PAP therapy last year, she had planned to start this March but with COVID-19 concerns decided to wait until our visit to discuss. She is now ready to start PAP therapy. She reports she has seen improved daytime sleepiness with adjustments to her thyroid medication but is still having issues while sleeping. She has been snoring for a number of years and her snoring has worsened, it is exacerbated by sleeping supine. She wakes up with dry mouth. She  will experience frequent awakening from sleep. In general she is able to return to sleep after awakening, she tends to awaken spontaneously. Cheraw Sleepiness Score: 4 and Modified F.O.S.Q. Score Total / 2: 20 which reflects mild daytime sleepiness.     Allergies   Allergen Reactions    Talwin [Pentazocine Lactate] Unknown (comments)     Altered sensorium    Amoxicillin Other (comments)     Diarrhea, stomach upset    Darvon [Propoxyphene] Unknown (comments)    Diamox Other (comments)     migraine    Morphine Other (comments)     Pt states she is very sensitive to all narcotics, feels like she will pass out    Simvastatin Other (comments)    Zithromax [Azithromycin] Other (comments)     depression       She has a current medication list which includes the following prescription(s): ammonium lactate, levothyroxine, escitalopram oxalate, estradiol, and loratadine. .      She  has a past medical history of Adverse effect of anesthesia, Depression, Headache(784.0), Hypercholesterolemia, Hypothyroid, Nephrolithiasis, and Scoliosis. Sleep Review of Systems: notable for Negative difficulty falling asleep; Positive awakenings at night; Negative chest pain; Negative shortness of breath; Negative significant joint pain at night; Negative significant muscle pain at night; Negative rashes or itching; Negative heartburn; Negative significant mood issues; 0 afternoon naps per week    Objective:   Vitals reported by patient    Visit Vitals  Ht 5' 3\" (1.6 m)   Wt 145 lb (65.8 kg)   BMI 25.69 kg/m²          Physical Exam completed by visual and auditory observation of patient with verbal input from patient. General:   Alert, oriented, not in acute distress   Eyes:  Anicteric Sclerae; no obvious strabismus   Nose:  No obvious nasal septum deviation    Neck:   Midline trachea, no visible mass   Chest/Lungs:  Respiratory effort normal, no visualized signs of difficulty breathing or respiratory distress   CVS:  No JVD   Extremities:  No obvious rashes noted on face, neck, or hands   Neuro:  No facial asymmetry, no focal deficits; no obvious tremor    Psych:  Normal affect,  normal countenance       Assessment:       ICD-10-CM ICD-9-CM    1. Obstructive sleep apnea (adult) (pediatric) G47.33 327.23 AMB SUPPLY ORDER   2. Thyroid disorder E07.9 246.9    3.  Adult BMI 25.0-25.9 kg/sq m Z68.25 V85.21      AHI 12.9(10/2019)    Patient has a history and examination consistent with the diagnosis of sleep apnea. Plan:     Follow-up and Dispositions    · Return in about 3 months (around 8/4/2020) for first adherence. * She was provided information on sleep apnea including corresponding risk factors and the importance of proper treatment. she would like to proceed with PAP therapy. Patient will be seen in follow-up in 6-8 weeks after PAP setup to gauge treatment response and adherence to therapy. Orders Placed This Encounter    AMB SUPPLY ORDER     Diagnosis: (G47.33) HALEY (obstructive sleep apnea)  (primary encounter diagnosis)       Respironics Dreamstation APAP 5 cm H20 to 12 cm H20    Heated Humidifier  Modem, chintan access to sleep center  Length of need 99    Mask-fitting evaluation and mask per patient preference        Nasal Pillows (Replace) 2 per month.  Nasal Interface Mask 1 every 3 months.  Pos Airway pressure chin strap   Headgear 1 every 6 months.  Tubing with heating element 1 every 3 months.  Filter(s) Disposable 2 per month.  Filter(s) Non-Disposable 1 every 6 months. .   [address] [facility] for Humidifier (Replace) 1 every 6 months. LIZETT AlBC; NPI: [id number]    Electronically signed. Date:- 05/04/20       * She was provided information on sleep apnea including corresponding risk factors and the importance of proper treatment. * Treatment options were reviewed in detail. she would like to proceed with PAP therapy. Patient will be seen in follow-up in 6-8 weeks after PAP setup to gauge treatment response and adherence to therapy. * The patient was counseled regarding proper sleep hygiene, with emphasis on ensuring sufficient total sleep time; safe driving and the benefits of exercise and weight loss. * All of her questions were addressed. 2. Thyroid Disorder - continue on her current regimen.      3. Recommended a dedicated weight loss program through appropriate diet and exercise regimen as significant weight reduction has been shown to reduce severity of obstructive sleep apnea. Patient's phone number [phone] (home)  was reviewed and confirmed for accuracy. She gives permission for messages regarding results and appointments to be left at that number. Pursuant to the emergency declaration under the [address] [place] [address] waiver authority and the [employer] Act, this Virtual  Visit was conducted, with patient's consent, to reduce the patient's risk of exposure to COVID-19 and provide continuity of care for an established patient. Services were provided through a video synchronous discussion virtually to substitute for in-person clinic visit. MADELIN Olmos, [address] [place] [address]  Electronically signed.  09/30/19

## 2020-05-04 NOTE — TELEPHONE ENCOUNTER
Spoke to [employer] at [employer] regarding her PAP set-up. It was never done. They claimed to have called her 4 times with a few messages that voicemail was full.

## 2020-05-12 DIAGNOSIS — E03.9 HYPOTHYROIDISM, ADULT: ICD-10-CM

## 2020-06-10 ENCOUNTER — TELEPHONE (OUTPATIENT)
Dept: INTERNAL MEDICINE CLINIC | Age: 75
End: 2020-06-10

## 2020-06-11 NOTE — TELEPHONE ENCOUNTER
Patient spoke with Evelyn Benton in March, 2020. Her lexapro dose was increased to 10mg daily . She is still feeling some fatigue and is wondering if it is her thyroid. She is taking levothyroxine 50mcg daily. She has an order to have her TSH pending still from 3/2020. Recommended she call our office to schedule a lab appointment. Patient states understanding and agrees with plan.

## 2020-06-15 ENCOUNTER — OFFICE VISIT (OUTPATIENT)
Dept: INTERNAL MEDICINE CLINIC | Age: 75
End: 2020-06-15

## 2020-06-15 ENCOUNTER — HOSPITAL ENCOUNTER (OUTPATIENT)
Dept: LAB | Age: 75
Discharge: HOME | End: 2020-06-15
Payer: MEDICARE

## 2020-06-15 VITALS — TEMPERATURE: 97.6 F

## 2020-06-15 PROCEDURE — 84439 ASSAY OF FREE THYROXINE: CPT

## 2020-06-15 PROCEDURE — 84443 ASSAY THYROID STIM HORMONE: CPT

## 2020-06-15 PROCEDURE — 36415 COLL VENOUS BLD VENIPUNCTURE: CPT

## 2020-06-16 ENCOUNTER — TELEPHONE (OUTPATIENT)
Dept: INTERNAL MEDICINE CLINIC | Age: 75
End: 2020-06-16

## 2020-06-16 LAB
T4 FREE SERPL-MCNC: 1.04 NG/DL (ref 0.82–1.77)
TSH SERPL DL<=0.005 MIU/L-ACNC: 5.23 UIU/ML (ref 0.45–4.5)

## 2020-06-16 NOTE — TELEPHONE ENCOUNTER
Patient notified that her TSH is elevated. She is taking levothyroxine 50mcg daily. Plan:  -increase dosage to 50mcg daily except twice weekly, she takes 100mcg. Patient states understanding and agrees with plan. .        Recheck Tsh is 6-8 weeks.

## 2020-06-18 ENCOUNTER — OFFICE VISIT (OUTPATIENT)
Dept: INTERNAL MEDICINE CLINIC | Age: 75
End: 2020-06-18

## 2020-06-18 VITALS
RESPIRATION RATE: 19 BRPM | HEART RATE: 66 BPM | OXYGEN SATURATION: 95 % | HEIGHT: 63 IN | TEMPERATURE: 97.6 F | WEIGHT: 144 LBS | BODY MASS INDEX: 25.52 KG/M2 | DIASTOLIC BLOOD PRESSURE: 70 MMHG | SYSTOLIC BLOOD PRESSURE: 119 MMHG

## 2020-06-18 DIAGNOSIS — E78.2 MIXED HYPERLIPIDEMIA: ICD-10-CM

## 2020-06-18 DIAGNOSIS — F32.A DEPRESSION, UNSPECIFIED DEPRESSION TYPE: ICD-10-CM

## 2020-06-18 DIAGNOSIS — Z79.899 ENCOUNTER FOR LONG-TERM (CURRENT) USE OF MEDICATIONS: ICD-10-CM

## 2020-06-18 DIAGNOSIS — E03.9 HYPOTHYROIDISM, ADULT: Primary | ICD-10-CM

## 2020-06-18 RX ORDER — ESCITALOPRAM OXALATE 10 MG/1
10 TABLET ORAL DAILY
COMMUNITY
End: 2020-07-31 | Stop reason: SDUPTHER

## 2020-06-18 RX ORDER — LEVOTHYROXINE SODIUM 50 UG/1
50 TABLET ORAL
Qty: 108 TAB | Refills: 0 | Status: SHIPPED | OUTPATIENT
Start: 2020-06-18 | End: 2020-07-31

## 2020-06-18 RX ORDER — LEVOTHYROXINE SODIUM 50 UG/1
50 TABLET ORAL
COMMUNITY
End: 2020-06-18 | Stop reason: SDUPTHER

## 2020-06-18 NOTE — PROGRESS NOTES
Subjective:      Jacky Lock is a 76 y.o. female who presents today for follow up of her hypothyroid, hyperlipidemia and depression. She follows with the sleep center fo her HALEY. Using a cpap. Her TSH was noted to be elevated on labs done earlier this week. Her levothyroxine dose was increased to 50mcg daily, but 2 days per week she takes 100mcg. Recommended she recheck her TSH in 6-8 weeks. She denies any chest pain, shortness of breath, syncope, headaches, nausea/vomiting, or any bowel changes. Patient Active Problem List   Diagnosis Code    Acquired hypothyroidism E03.9    Hypercholesteremia E78.00    History of colonoscopy Z98.890    Environmental allergies Z91.09    Xerosis of skin L85.3    Vitamin D deficiency E55.9    Osteopenia M85.80    Adjustment disorder F43.20    Migraine headache G43.909    Family history of breast cancer in first degree relative Z80.3    H/O screening mammography Z92.89    HALEY (obstructive sleep apnea) G47.33     Current Outpatient Medications   Medication Sig Dispense Refill    escitalopram oxalate (Lexapro) 10 mg tablet Take 10 mg by mouth daily.  levothyroxine (SYNTHROID) 50 mcg tablet Take 1 Tab by mouth Daily (before breakfast). 1 tablet daily with the exception 2 days per week she takes 2 tablets. 108 Tab 0    ammonium lactate (AMLACTIN) 12 % topical cream RUB IN TO AFFECTED AREA WELL. 280 g 0    estradiol (ESTRACE) 0.01 % (0.1 mg/gram) vaginal cream Indications: pt has not started Rx at this time.  loratadine (CLARITIN) 10 mg tablet Take 10 mg by mouth. Review of Systems    Pertinent items are noted in HPI.      Objective:     Visit Vitals  /70 (BP 1 Location: Left arm, BP Patient Position: Sitting)   Pulse 66   Temp 97.6 °F (36.4 °C) (Temporal)   Resp 19   Ht 5' 3\" (1.6 m)   Wt 144 lb (65.3 kg)   SpO2 95%   BMI 25.51 kg/m²     General appearance: alert, cooperative, no distress, appears stated age  Head: Normocephalic, without obvious abnormality, atraumatic  Neck: supple, symmetrical, trachea midline, no adenopathy, thyroid: not enlarged, symmetric, no tenderness/mass/nodules, no carotid bruit and no JVD  Lungs: clear to auscultation bilaterally  Heart: regular rate and rhythm, S1, S2 normal, no murmur, click, rub or gallop  Abdomen: soft, non-tender. Bowel sounds normal. No masses,  no organomegaly  Extremities: extremities normal, atraumatic, no cyanosis or edema    Assessment/Plan:   1. Hypothyroidism, adult  -lab slip given to have her TSH rechecked in the mid August, 2020. She is taking levothyroxine 50mg daily except 2 days per week, she takes 100mcg daily.     - TSH 3RD GENERATION; Future  - T4, FREE; Future  - METABOLIC PANEL, COMPREHENSIVE    2. Depression, unspecified depression type  -I evaluated and recommended to continue current doses of medications. On lexapro 10mg daily. 3. Mixed hyperlipidemia  -due for fasting lipid panel     - LIPID PANEL  - CBC WITH AUTOMATED DIFF    4. Encounter for long-term (current) use of medications         Follow-up Disposition:     Follow up in 6 months     Return if symptoms worsen or fail to improve. Advised patient to call back or return to office if symptoms worsen/change/persist.     Discussed expected course/resolution/complications of diagnosis in detail with patient. Medication risks/benefits/costs/interactions/alternatives discussed with patient. Patient was given an after visit summary which includes diagnoses, current medications, & vitals. Patient expressed understanding with the diagnosis and plan.

## 2020-07-11 ENCOUNTER — OFFICE VISIT (OUTPATIENT)
Dept: PRIMARY CARE CLINIC | Age: 75
End: 2020-07-11

## 2020-07-11 DIAGNOSIS — Z20.822 EXPOSURE TO COVID-19 VIRUS: Primary | ICD-10-CM

## 2020-07-11 NOTE — PATIENT INSTRUCTIONS
Learning About Coronavirus [phone])  Coronavirus [phone]): Overview  What is coronavirus (COVID-19)? The coronavirus disease (COVID-19) is caused by a virus. It is an illness that was first found in [place], [place], in December 2019. It has since spread worldwide. The virus can cause fever, cough, and trouble breathing. In severe cases, it can cause pneumonia and make it hard to breathe without help. It can cause death. Coronaviruses are a large group of viruses. They cause the common cold. They also cause more serious illnesses like Middle East respiratory syndrome (MERS) and severe acute respiratory syndrome (SARS). COVID-19 is caused by a novel coronavirus. That means it's a new type that has not been seen in people before. This virus spreads person-to-person through droplets from coughing and sneezing. It can also spread when you are close to someone who is infected. And it can spread when you touch something that has the virus on it, such as a doorknob or a tabletop. What can you do to protect yourself from coronavirus (COVID-19)? The best way to protect yourself from getting sick is to:  · Avoid areas where there is an outbreak. · Avoid contact with people who may be infected. · Wash your hands often with soap or alcohol-based hand sanitizers. · Avoid crowds and try to stay at least 6 feet away from other people. · Wash your hands often, especially after you cough or sneeze. Use soap and water, and scrub for at least 20 seconds. If soap and water aren't available, use an alcohol-based hand . · Avoid touching your mouth, nose, and eyes. What can you do to avoid spreading the virus to others? To help avoid spreading the virus to others:  · Cover your mouth with a tissue when you cough or sneeze. Then throw the tissue in the trash. · Use a disinfectant to clean things that you touch often. · Stay home if you are sick or have been exposed to the virus.  Don't go to school, work, or public areas. And don't use public transportation. · If you are sick:  ? Leave your home only if you need to get medical care. But call the doctor's office first so they know you're coming. And wear a face mask, if you have one.  ? If you have a face mask, wear it whenever you're around other people. It can help stop the spread of the virus when you cough or sneeze. ? Clean and disinfect your home every day. Use household  and disinfectant wipes or sprays. Take special care to clean things that you grab with your hands. These include doorknobs, remote controls, phones, and handles on your refrigerator and microwave. And don't forget countertops, tabletops, bathrooms, and computer keyboards. When to call for help  Call 911 anytime you think you may need emergency care. For example, call if:  · You have severe trouble breathing. (You can't talk at all.)  · You have constant chest pain or pressure. · You are severely dizzy or lightheaded. · You are confused or can't think clearly. · Your face and lips have a blue color. · You pass out (lose consciousness) or are very hard to wake up. Call your doctor now if you develop symptoms such as:  · Shortness of breath. · Fever. · Cough. If you need to get care, call ahead to the doctor's office for instructions before you go. Make sure you wear a face mask, if you have one, to prevent exposing other people to the virus. Where can you get the latest information? The following health organizations are tracking and studying this virus. Their websites contain the most up-to-date information. Christiano Levin also learn what to do if you think you may have been exposed to the virus. · U.S. Centers for Disease Control and Prevention (CDC): The CDC provides updated news about the disease and travel advice. The website also tells you how to prevent the spread of infection. www.cdc.gov  · World Health Organization [facility]): WHO offers information about the virus outbreaks.  WHO also has travel advice. www.who.int  Current as of: April 1, 2020               Content Version: 12.4  © 2489-4551 Health[employer], [employer]orporated. Care instructions adapted under license by your healthcare professional. If you have questions about a medical condition or this instruction, always ask your healthcare professional. [address] any warranty or liability for your use of this information.

## 2020-07-11 NOTE — PROGRESS NOTES
[facility]wona Schmidt [facility]  Flu Clinic Note      Subjective:   Gladys Benjamin is a 76y.o. year old female who presents to flu clinic for evaluation of the following:    See scanned note for details. Exposure to [address] Infection  Patient requests retesting   Denies current symptoms of COVID19      Review of Systems   Pertinent positives and negative per HPI. All other systems  reviewed are negative for a Comprehensive ROS (10+). Past Medical History:   Diagnosis Date    Adverse effect of anesthesia     SLOW WAKING PAST ANESTHESIA, SEVERE HEADACHE    Depression     Headache(784.0)     migraines    Hypercholesterolemia     Hypothyroid     Nephrolithiasis     Scoliosis         Social History     Socioeconomic History    Marital status:      Spouse name: Not on file    Number of children: Not on file    Years of education: Not on file    Highest education level: Not on file   Occupational History    Not on file   Social Needs    Financial resource strain: Not on file    Food insecurity     Worry: Not on file     Inability: Not on file    Transportation needs     Medical: Not on file     Non-medical: Not on file   Tobacco Use    Smoking status: Former Smoker     Years: 7.00     Last attempt to quit: 1970     Years since quittin.5    Smokeless tobacco: Never Used    Tobacco comment: SOCIAL SMOKER ON THE WEEKEND   Substance and Sexual Activity    Alcohol use:  Yes     Alcohol/week: 1.0 - 2.0 standard drinks     Types: 1 Shots of liquor per week     Comment: OCCASIONALLY    Drug use: No    Sexual activity: Yes     Partners: Male   Lifestyle    Physical activity     Days per week: Not on file     Minutes per session: Not on file    Stress: Not on file   Relationships    Social connections     Talks on phone: Not on file     Gets together: Not on file     Attends Cheondoism service: Not on file     Active member of club or organization: Not on file Attends meetings of clubs or organizations: Not on file     Relationship status: Not on file    Intimate partner violence     Fear of current or ex partner: Not on file     Emotionally abused: Not on file     Physically abused: Not on file     Forced sexual activity: Not on file   Other Topics Concern    Not on file   Social History Narrative    Not on file           Objective:   See scanned note for vitals. Physical Examination:  General: Alert, cooperative, no distress, appears stated age. Eyes: Conjunctivae clear. Pupils equally round. Extraocular muscles intact. Ears: Normal appearing external ear   Nose: Nares normal appearing  Mouth/Throat: Lips, mucosa, and tongue normal. Moist mucous membranes. No tonsillar enlargement noted. Neck: Supple, symmetrical, trachea midline, no neck mass visualized. Respiratory: Breathing comfortably, in no acute respiratory distress. Cardiovascular: Visualized extremities without edema. MSK: Upper extremities normal appearing. Skin: Skin color, texture, turgor normal. No rashes or lesions on exposed skin. Neurologic: No facial asymmetry. Normal gaze  Psychiatric: Affect appropriate. Thoughts logical. Speech volume and speed normal. No hallucinations. Well kempt. Orders Only on 05/12/2020   Component Date Value Ref Range Status    TSH 06/15/2020 5.230* 0.450 - 4.500 uIU/mL Final    T4, Free 06/15/2020 1.04  0.82 - 1.77 ng/dL Final         Assessment/ Plan:   Diagnoses and all orders for this visit:    1. Exposure to COVID-19 virus  -     NOVEL CORONAVIRUS (COVID-19); Future      COVDI19 test done as requested. Currently asymptomatic. Educated patient on red flag symptoms to warrant return to clinic or emergency room visit. I have discussed the diagnosis with the patient and the intended plan as seen in the above orders. The patient has been offered or received an after-visit summary and questions were answered concerning future plans.   I have discussed medication side effects and warnings with the patient as well. Follow-up and Dispositions    · Return if symptoms worsen or fail to improve.           Signed,    Sabrina Lawson MD  7/11/2020

## 2020-07-16 LAB — SARS-COV-2, NAA: NOT DETECTED

## 2020-07-22 ENCOUNTER — TELEPHONE (OUTPATIENT)
Dept: INTERNAL MEDICINE CLINIC | Age: 75
End: 2020-07-22

## 2020-07-22 RX ORDER — LORATADINE 10 MG/1
10 TABLET ORAL DAILY
Qty: 90 TAB | Refills: 2 | Status: SHIPPED | OUTPATIENT
Start: 2020-07-22

## 2020-07-31 DIAGNOSIS — L85.3 XEROSIS OF SKIN: ICD-10-CM

## 2020-07-31 RX ORDER — LEVOTHYROXINE SODIUM 50 UG/1
TABLET ORAL
Qty: 90 TAB | Refills: 0 | Status: SHIPPED | OUTPATIENT
Start: 2020-07-31 | End: 2020-12-17

## 2020-07-31 RX ORDER — AMMONIUM LACTATE 12 G/100G
CREAM TOPICAL
Qty: 280 G | Refills: 0 | Status: SHIPPED | OUTPATIENT
Start: 2020-07-31 | End: 2021-01-04 | Stop reason: SDUPTHER

## 2020-07-31 RX ORDER — ESCITALOPRAM OXALATE 5 MG/1
TABLET ORAL
Qty: 90 TAB | Refills: 1 | OUTPATIENT
Start: 2020-07-31

## 2020-07-31 RX ORDER — ESCITALOPRAM OXALATE 10 MG/1
10 TABLET ORAL DAILY
Qty: 90 TAB | Refills: 1 | Status: SHIPPED | OUTPATIENT
Start: 2020-07-31 | End: 2021-01-04 | Stop reason: SDUPTHER

## 2020-08-17 ENCOUNTER — HOSPITAL ENCOUNTER (OUTPATIENT)
Dept: LAB | Age: 75
Discharge: HOME | End: 2020-08-17
Payer: MEDICARE

## 2020-08-17 ENCOUNTER — TELEPHONE (OUTPATIENT)
Dept: SLEEP MEDICINE | Age: 75
End: 2020-08-17

## 2020-08-17 DIAGNOSIS — E03.9 HYPOTHYROIDISM, ADULT: ICD-10-CM

## 2020-08-17 PROCEDURE — 36415 COLL VENOUS BLD VENIPUNCTURE: CPT

## 2020-08-17 PROCEDURE — 84443 ASSAY THYROID STIM HORMONE: CPT

## 2020-08-17 PROCEDURE — 85025 COMPLETE CBC W/AUTO DIFF WBC: CPT

## 2020-08-17 PROCEDURE — 80061 LIPID PANEL: CPT

## 2020-08-17 PROCEDURE — 84439 ASSAY OF FREE THYROXINE: CPT

## 2020-08-17 PROCEDURE — 80053 COMPREHEN METABOLIC PANEL: CPT

## 2020-08-17 NOTE — TELEPHONE ENCOUNTER
Patient called into the office to discuss her cpap usage and other treatment options. Please call her back at [phone].

## 2020-08-18 LAB
ALBUMIN SERPL-MCNC: 4.3 G/DL (ref 3.7–4.7)
ALBUMIN/GLOB SERPL: 1.9 {RATIO} (ref 1.2–2.2)
ALP SERPL-CCNC: 65 IU/L (ref 39–117)
ALT SERPL-CCNC: 13 IU/L (ref 0–32)
AST SERPL-CCNC: 22 IU/L (ref 0–40)
BASOPHILS # BLD AUTO: 0 X10E3/UL (ref 0–0.2)
BASOPHILS NFR BLD AUTO: 1 %
BILIRUB SERPL-MCNC: 0.4 MG/DL (ref 0–1.2)
BUN SERPL-MCNC: 12 MG/DL (ref 8–27)
BUN/CREAT SERPL: 14 (ref 12–28)
CALCIUM SERPL-MCNC: 9.4 MG/DL (ref 8.7–10.3)
CHLORIDE SERPL-SCNC: 101 MMOL/L (ref 96–106)
CHOLEST SERPL-MCNC: 264 MG/DL (ref 100–199)
CO2 SERPL-SCNC: 23 MMOL/L (ref 20–29)
CREAT SERPL-MCNC: 0.87 MG/DL (ref 0.57–1)
EOSINOPHIL # BLD AUTO: 0.1 X10E3/UL (ref 0–0.4)
EOSINOPHIL NFR BLD AUTO: 3 %
ERYTHROCYTE [DISTWIDTH] IN BLOOD BY AUTOMATED COUNT: 12.9 % (ref 11.7–15.4)
GLOBULIN SER CALC-MCNC: 2.3 G/DL (ref 1.5–4.5)
GLUCOSE SERPL-MCNC: 96 MG/DL (ref 65–99)
HCT VFR BLD AUTO: 44 % (ref 34–46.6)
HDLC SERPL-MCNC: 47 MG/DL
HGB BLD-MCNC: 14.3 G/DL (ref 11.1–15.9)
IMM GRANULOCYTES # BLD AUTO: 0 X10E3/UL (ref 0–0.1)
IMM GRANULOCYTES NFR BLD AUTO: 0 %
LDLC SERPL CALC-MCNC: 187 MG/DL (ref 0–99)
LYMPHOCYTES # BLD AUTO: 1.4 X10E3/UL (ref 0.7–3.1)
LYMPHOCYTES NFR BLD AUTO: 34 %
MCH RBC QN AUTO: 29.7 PG (ref 26.6–33)
MCHC RBC AUTO-ENTMCNC: 32.5 G/DL (ref 31.5–35.7)
MCV RBC AUTO: 91 FL (ref 79–97)
MONOCYTES # BLD AUTO: 0.3 X10E3/UL (ref 0.1–0.9)
MONOCYTES NFR BLD AUTO: 9 %
NEUTROPHILS # BLD AUTO: 2.1 X10E3/UL (ref 1.4–7)
NEUTROPHILS NFR BLD AUTO: 53 %
PLATELET # BLD AUTO: 230 X10E3/UL (ref 150–450)
POTASSIUM SERPL-SCNC: 5 MMOL/L (ref 3.5–5.2)
PROT SERPL-MCNC: 6.6 G/DL (ref 6–8.5)
RBC # BLD AUTO: 4.82 X10E6/UL (ref 3.77–5.28)
SODIUM SERPL-SCNC: 140 MMOL/L (ref 134–144)
T4 FREE SERPL-MCNC: 1.2 NG/DL (ref 0.82–1.77)
TRIGL SERPL-MCNC: 151 MG/DL (ref 0–149)
TSH SERPL DL<=0.005 MIU/L-ACNC: 0.98 UIU/ML (ref 0.45–4.5)
VLDLC SERPL CALC-MCNC: 30 MG/DL (ref 5–40)
WBC # BLD AUTO: 4 X10E3/UL (ref 3.4–10.8)

## 2020-08-18 NOTE — TELEPHONE ENCOUNTER
Patient last seen by me on 5/4/2020, previously seen by Dr. Cory Padgett 6/12/2019, prior notes reviewed in detail.  In lab sleep test 10/2019 showed AHI of 12.9/hr with a lowest SaO2 of 87%. PAP therapy not started at time of testing, device ordered 5/4/2020. No record of device set up or data available remotely. Message sent to [address] [place] [address] for data - they report device has not been set up yet. Called patient and she is busy at moment, will call her back later. Returned patient call. We have discussed the benefits of PAP therapy and the related insurance requirements for use of a minimum of 4 hours at least 70% of the days in a 30 day period. She has been talking to [facility] and is confused about the costs. I referred her back to [facility].     Stephany Laurent NP, [facility]  08/18/20

## 2020-08-21 ENCOUNTER — TELEPHONE (OUTPATIENT)
Dept: INTERNAL MEDICINE CLINIC | Age: 75
End: 2020-08-21

## 2020-08-21 NOTE — TELEPHONE ENCOUNTER
Spoke with patient and pasquale lab results.  She did not share the details of her condition, only said that she would call the office back Monday if there was no improvement

## 2020-09-28 ENCOUNTER — TRANSCRIBE ORDER (OUTPATIENT)
Dept: SCHEDULING | Age: 75
End: 2020-09-28

## 2020-09-28 DIAGNOSIS — Z12.31 VISIT FOR SCREENING MAMMOGRAM: Primary | ICD-10-CM

## 2020-10-07 ENCOUNTER — OFFICE VISIT (OUTPATIENT)
Dept: INTERNAL MEDICINE CLINIC | Age: 75
End: 2020-10-07
Payer: MEDICARE

## 2020-10-07 VITALS — TEMPERATURE: 97.3 F

## 2020-10-07 DIAGNOSIS — Z23 NEEDS FLU SHOT: Primary | ICD-10-CM

## 2020-10-07 PROCEDURE — 90694 VACC AIIV4 NO PRSRV 0.5ML IM: CPT | Performed by: INTERNAL MEDICINE

## 2020-10-07 NOTE — PATIENT INSTRUCTIONS
Vaccine Information Statement Influenza (Flu) Vaccine (Inactivated or Recombinant): What You Need to Know Many Vaccine Information Statements are available in [place] and other languages. See www.immunize.org/vis Hojas de información sobre vacunas están disponibles en español y en muchos otros idiomas. Visite www.immunize.org/vis 1. Why get vaccinated? Influenza vaccine can prevent influenza (flu). Flu is a contagious disease that spreads around the [place] [facility] every year, usually between October and May. Anyone can get the flu, but it is more dangerous for some people. Infants and young children, people 72years of age and older, pregnant women, and people with certain health conditions or a weakened immune system are at greatest risk of flu complications. Pneumonia, bronchitis, sinus infections and ear infections are examples of flu-related complications. If you have a medical condition, such as heart disease, cancer or diabetes, flu can make it worse. Flu can cause fever and chills, sore throat, muscle aches, fatigue, cough, headache, and runny or stuffy nose. Some people may have vomiting and diarrhea, though this is more common in children than adults. Each year thousands of people in the [facility] die from flu, and many more are hospitalized. Flu vaccine prevents millions of illnesses and flu-related visits to the doctor each year. 2. Influenza vaccines CDC recommends everyone 10months of age and older get vaccinated every flu season. Children 6 months through 6years of age may need 2 doses during a single flu season. Everyone else needs only 1 dose each flu season. It takes about 2 weeks for protection to develop after vaccination. There are many flu viruses, and they are always changing. Each year a new flu vaccine is made to protect against three or four viruses that are likely to cause disease in the upcoming flu season.  Even when the vaccine doesnt exactly match these viruses, it may still provide some protection. Influenza vaccine does not cause flu. Influenza vaccine may be given at the same time as other vaccines. 3. Talk with your health care provider Tell your vaccine provider if the person getting the vaccine: 
 Has had an allergic reaction after a previous dose of influenza vaccine, or has any severe, life-threatening allergies.  Has ever had Guillain-Barré Syndrome (also called GBS). In some cases, your health care provider may decide to postpone influenza vaccination to a future visit. People with minor illnesses, such as a cold, may be vaccinated. People who are moderately or severely ill should usually wait until they recover before getting influenza vaccine. Your health care provider can give you more information. 4. Risks of a reaction  Soreness, redness, and swelling where shot is given, fever, muscle aches, and headache can happen after influenza vaccine.  There may be a very small increased risk of Guillain-Barré Syndrome (GBS) after inactivated influenza vaccine (the flu shot). Breonna Huizar children who get the flu shot along with pneumococcal vaccine (PCV13), and/or DTaP vaccine at the same time might be slightly more likely to have a seizure caused by fever. Tell your health care provider if a child who is getting flu vaccine has ever had a seizure. People sometimes faint after medical procedures, including vaccination. Tell your provider if you feel dizzy or have vision changes or ringing in the ears. As with any medicine, there is a very remote chance of a vaccine causing a severe allergic reaction, other serious injury, or death. 5. What if there is a serious problem? An allergic reaction could occur after the vaccinated person leaves the clinic.  If you see signs of a severe allergic reaction (hives, swelling of the face and throat, difficulty breathing, a fast heartbeat, dizziness, or weakness), call 9-1-1 and get the person to the nearest hospital. 
 
For other signs that concern you, call your health care provider. Adverse reactions should be reported to the Vaccine Adverse Event Reporting System (VAERS). Your health care provider will usually file this report, or you can do it yourself. Visit the VAERS website at www.vaers. hhs.gov or call 1-560.578.1303. VAERS is only for reporting reactions, and VAERS staff do not give medical advice. 6. The National Vaccine Injury Compensation Program 
 
The [facility] Vaccine Injury Compensation Program (VICP) is a federal program that was created to compensate people who may have been injured by certain vaccines. Visit the VICP website at www.hrsa.gov/vaccinecompensation or call 6-618.694.2110 to learn about the program and about filing a claim. There is a time limit to file a claim for compensation. 7. How can I learn more?  Ask your health care provider.  Call your local or state health department.  Contact the Centers for Disease Control and Prevention (CDC): 
- Call 5-554.736.4546 (1-800-CDC-INFO) or 
- Visit CDCs influenza website at www.cdc.gov/flu Vaccine Information Statement (Interim) Inactivated Influenza Vaccine 8/15/2019 
[address] [id number] Department of Health and [facility] Centers for Disease Control and Prevention Office Use Only

## 2020-10-07 NOTE — PROGRESS NOTES
Pharmacy Note - Immunizations    Leonel Willett is a 76 y.o.  female  who present for a flu shot. She denies any symptoms, reactions or allergies that would exclude them from being immunized today. Risks and adverse reactions were discussed and the VIS was given to them. All questions were addressed. Verbal order received from Dr. Go Gallagher    Patient was observed for 10 min post injection. There were no reactions observed.     CELESTE FinnD BCACP    CLINICAL PHARMACY CONSULT: MED RECONCILIATION/REVIEW ADDENDUM    For Pharmacy Admin Tracking Only    PHSO: PHSO Patient?: Yes  Total # of Interventions Recommended: Count: 1  Total Interventions Accepted: 1  Time Spent (min): 15    CELESTE McleanD, BCACP

## 2020-11-10 NOTE — TELEPHONE ENCOUNTER
Noted.    Results reviewed with her. She had mild apnea worse in REM sleep. Treatment options for sleep apnea were reviewed. She wants to think about which option she would prefer and will get back to me.  All of her questions addressed

## 2020-12-17 ENCOUNTER — HOSPITAL ENCOUNTER (OUTPATIENT)
Dept: MAMMOGRAPHY | Age: 75
Discharge: HOME | End: 2020-12-17
Attending: INTERNAL MEDICINE | Admitting: INTERNAL MEDICINE
Payer: MEDICARE

## 2020-12-17 DIAGNOSIS — Z12.31 VISIT FOR SCREENING MAMMOGRAM: ICD-10-CM

## 2020-12-17 PROCEDURE — 77063 BREAST TOMOSYNTHESIS BI: CPT

## 2021-01-04 ENCOUNTER — OFFICE VISIT (OUTPATIENT)
Dept: INTERNAL MEDICINE CLINIC | Age: 76
End: 2021-01-04
Payer: MEDICARE

## 2021-01-04 VITALS
OXYGEN SATURATION: 97 % | BODY MASS INDEX: 26.08 KG/M2 | RESPIRATION RATE: 16 BRPM | WEIGHT: 147.2 LBS | HEIGHT: 63 IN | SYSTOLIC BLOOD PRESSURE: 130 MMHG | HEART RATE: 61 BPM | DIASTOLIC BLOOD PRESSURE: 83 MMHG | TEMPERATURE: 97.3 F

## 2021-01-04 DIAGNOSIS — E78.2 MIXED HYPERLIPIDEMIA: ICD-10-CM

## 2021-01-04 DIAGNOSIS — Z13.31 SCREENING FOR DEPRESSION: ICD-10-CM

## 2021-01-04 DIAGNOSIS — L85.3 XEROSIS OF SKIN: ICD-10-CM

## 2021-01-04 DIAGNOSIS — Z00.00 MEDICARE ANNUAL WELLNESS VISIT, SUBSEQUENT: Primary | ICD-10-CM

## 2021-01-04 DIAGNOSIS — Z79.899 ENCOUNTER FOR LONG-TERM (CURRENT) USE OF MEDICATIONS: ICD-10-CM

## 2021-01-04 DIAGNOSIS — F32.A DEPRESSION, UNSPECIFIED DEPRESSION TYPE: ICD-10-CM

## 2021-01-04 DIAGNOSIS — E03.9 HYPOTHYROIDISM, ADULT: ICD-10-CM

## 2021-01-04 PROCEDURE — G0439 PPPS, SUBSEQ VISIT: HCPCS | Performed by: INTERNAL MEDICINE

## 2021-01-04 PROCEDURE — G8427 DOCREV CUR MEDS BY ELIG CLIN: HCPCS | Performed by: INTERNAL MEDICINE

## 2021-01-04 PROCEDURE — G8536 NO DOC ELDER MAL SCRN: HCPCS | Performed by: INTERNAL MEDICINE

## 2021-01-04 PROCEDURE — 1101F PT FALLS ASSESS-DOCD LE1/YR: CPT | Performed by: INTERNAL MEDICINE

## 2021-01-04 PROCEDURE — G8510 SCR DEP NEG, NO PLAN REQD: HCPCS | Performed by: INTERNAL MEDICINE

## 2021-01-04 PROCEDURE — G0463 HOSPITAL OUTPT CLINIC VISIT: HCPCS | Performed by: INTERNAL MEDICINE

## 2021-01-04 PROCEDURE — 99214 OFFICE O/P EST MOD 30 MIN: CPT | Performed by: INTERNAL MEDICINE

## 2021-01-04 PROCEDURE — G8399 PT W/DXA RESULTS DOCUMENT: HCPCS | Performed by: INTERNAL MEDICINE

## 2021-01-04 PROCEDURE — G8419 CALC BMI OUT NRM PARAM NOF/U: HCPCS | Performed by: INTERNAL MEDICINE

## 2021-01-04 PROCEDURE — 1090F PRES/ABSN URINE INCON ASSESS: CPT | Performed by: INTERNAL MEDICINE

## 2021-01-04 PROCEDURE — 3017F COLORECTAL CA SCREEN DOC REV: CPT | Performed by: INTERNAL MEDICINE

## 2021-01-04 RX ORDER — ESCITALOPRAM OXALATE 10 MG/1
10 TABLET ORAL DAILY
Qty: 90 TAB | Refills: 1 | Status: SHIPPED | OUTPATIENT
Start: 2021-01-04 | End: 2021-08-02

## 2021-01-04 RX ORDER — AMMONIUM LACTATE 12 G/100G
CREAM TOPICAL
Qty: 280 G | Refills: 0 | Status: SHIPPED | OUTPATIENT
Start: 2021-01-04 | End: 2021-01-15

## 2021-01-04 RX ORDER — LEVOTHYROXINE SODIUM 50 UG/1
TABLET ORAL
Qty: 120 TAB | Refills: 1 | Status: SHIPPED | OUTPATIENT
Start: 2021-01-04 | End: 2021-09-28

## 2021-01-04 NOTE — PATIENT INSTRUCTIONS
Medicare Wellness Visit, Female The best way to live healthy is to have a lifestyle where you eat a well-balanced diet, exercise regularly, limit alcohol use, and quit all forms of tobacco/nicotine, if applicable. Regular preventive services are another way to keep healthy. Preventive services (vaccines, screening tests, monitoring & exams) can help personalize your care plan, which helps you manage your own care. Screening tests can find health problems at the earliest stages, when they are easiest to treat. [facility] follows the current, evidence-based guidelines published by the [facility] Leoncio Bazzi ([facility]) when recommending preventive services for our patients. Because we follow these guidelines, sometimes recommendations change over time as research supports it. (For example, mammograms used to be recommended annually. Even though Medicare will still pay for an annual mammogram, the newer guidelines recommend a mammogram every two years for women of average risk). Of course, you and your doctor may decide to screen more often for some diseases, based on your risk and your co-morbidities (chronic disease you are already diagnosed with). Preventive services for you include: - Medicare offers their members a free annual wellness visit, which is time for you and your primary care provider to discuss and plan for your preventive service needs. Take advantage of this benefit every year! 
-All adults over the age of 72 should receive the recommended pneumonia vaccines. Current USPSTF guidelines recommend a series of two vaccines for the best pneumonia protection.  
-All adults should have a flu vaccine yearly and a tetanus vaccine every 10 years.  
-All adults age 48 and older should receive the shingles vaccines (series of two vaccines). -All adults age 38-68 who are overweight should have a diabetes screening test once every three years. -All adults born between [birth year] and 1965 should be screened once for Hepatitis C. 
-Other screening tests and preventive services for persons with diabetes include: an eye exam to screen for diabetic retinopathy, a kidney function test, a foot exam, and stricter control over your cholesterol.  
-Cardiovascular screening for adults with routine risk involves an electrocardiogram (ECG) at intervals determined by your doctor.  
-Colorectal cancer screenings should be done for adults age 54-65 with no increased risk factors for colorectal cancer. There are a number of acceptable methods of screening for this type of cancer. Each test has its own benefits and drawbacks. Discuss with your doctor what is most appropriate for you during your annual wellness visit. The different tests include: colonoscopy (considered the best screening method), a fecal occult blood test, a fecal DNA test, and sigmoidoscopy. 
 
-A bone mass density test is recommended when a woman turns 65 to screen for osteoporosis. This test is only recommended one time, as a screening. Some providers will use this same test as a disease monitoring tool if you already have osteoporosis. -Breast cancer screenings are recommended every other year for women of normal risk, age 54-69. 
-Cervical cancer screenings for women over age 72 are only recommended with certain risk factors. Here is a list of your current Health Maintenance items (your personalized list of preventive services) with a due date: There are no preventive care reminders to display for this patient.

## 2021-01-05 LAB
ALBUMIN SERPL-MCNC: 4 G/DL (ref 3.5–5)
ALBUMIN/GLOB SERPL: 1.5 {RATIO} (ref 1.1–2.2)
ALP SERPL-CCNC: 67 U/L (ref 45–117)
ALT SERPL-CCNC: 23 U/L (ref 12–78)
ANION GAP SERPL CALC-SCNC: 5 MMOL/L (ref 5–15)
AST SERPL-CCNC: 20 U/L (ref 15–37)
BASOPHILS # BLD: 0 K/UL (ref 0–0.1)
BASOPHILS NFR BLD: 1 % (ref 0–1)
BILIRUB SERPL-MCNC: 0.3 MG/DL (ref 0.2–1)
BUN SERPL-MCNC: 14 MG/DL (ref 6–20)
BUN/CREAT SERPL: 17 (ref 12–20)
CALCIUM SERPL-MCNC: 9.1 MG/DL (ref 8.5–10.1)
CHLORIDE SERPL-SCNC: 106 MMOL/L (ref 97–108)
CHOLEST SERPL-MCNC: 270 MG/DL
CO2 SERPL-SCNC: 28 MMOL/L (ref 21–32)
CREAT SERPL-MCNC: 0.82 MG/DL (ref 0.55–1.02)
DIFFERENTIAL METHOD BLD: ABNORMAL
EOSINOPHIL # BLD: 0.2 K/UL (ref 0–0.4)
EOSINOPHIL NFR BLD: 3 % (ref 0–7)
ERYTHROCYTE [DISTWIDTH] IN BLOOD BY AUTOMATED COUNT: 14.1 % (ref 11.5–14.5)
GLOBULIN SER CALC-MCNC: 2.6 G/DL (ref 2–4)
GLUCOSE SERPL-MCNC: 81 MG/DL (ref 65–100)
HCT VFR BLD AUTO: 44.4 % (ref 35–47)
HDLC SERPL-MCNC: 50 MG/DL
HDLC SERPL: 5.4 {RATIO} (ref 0–5)
HGB BLD-MCNC: 13.9 G/DL (ref 11.5–16)
IMM GRANULOCYTES # BLD AUTO: 0 K/UL (ref 0–0.04)
IMM GRANULOCYTES NFR BLD AUTO: 1 % (ref 0–0.5)
LDLC SERPL CALC-MCNC: 176 MG/DL (ref 0–100)
LIPID PROFILE,FLP: ABNORMAL
LYMPHOCYTES # BLD: 1.5 K/UL (ref 0.8–3.5)
LYMPHOCYTES NFR BLD: 34 % (ref 12–49)
MCH RBC QN AUTO: 29.8 PG (ref 26–34)
MCHC RBC AUTO-ENTMCNC: 31.3 G/DL (ref 30–36.5)
MCV RBC AUTO: 95.3 FL (ref 80–99)
MONOCYTES # BLD: 0.4 K/UL (ref 0–1)
MONOCYTES NFR BLD: 10 % (ref 5–13)
NEUTS SEG # BLD: 2.3 K/UL (ref 1.8–8)
NEUTS SEG NFR BLD: 51 % (ref 32–75)
NRBC # BLD: 0 K/UL (ref 0–0.01)
NRBC BLD-RTO: 0 PER 100 WBC
PLATELET # BLD AUTO: 223 K/UL (ref 150–400)
PMV BLD AUTO: 11.2 FL (ref 8.9–12.9)
POTASSIUM SERPL-SCNC: 4.7 MMOL/L (ref 3.5–5.1)
PROT SERPL-MCNC: 6.6 G/DL (ref 6.4–8.2)
RBC # BLD AUTO: 4.66 M/UL (ref 3.8–5.2)
SODIUM SERPL-SCNC: 139 MMOL/L (ref 136–145)
T4 FREE SERPL-MCNC: 1 NG/DL (ref 0.8–1.5)
TRIGL SERPL-MCNC: 220 MG/DL (ref ?–150)
TSH SERPL DL<=0.05 MIU/L-ACNC: 1.56 UIU/ML (ref 0.36–3.74)
VLDLC SERPL CALC-MCNC: 44 MG/DL
WBC # BLD AUTO: 4.4 K/UL (ref 3.6–11)

## 2021-01-15 DIAGNOSIS — L85.3 XEROSIS OF SKIN: ICD-10-CM

## 2021-01-15 RX ORDER — AMMONIUM LACTATE 12 G/100G
CREAM TOPICAL
Qty: 280 G | Refills: 0 | Status: SHIPPED | OUTPATIENT
Start: 2021-01-15 | End: 2021-07-04

## 2021-01-25 ENCOUNTER — TELEPHONE (OUTPATIENT)
Dept: INTERNAL MEDICINE CLINIC | Age: 76
End: 2021-01-25

## 2021-01-26 NOTE — TELEPHONE ENCOUNTER
Patient received her lab letter in the mail today. It was mailed on 1/7/21. She needs to reduce the fats and sugars in her diet. Patient states understanding and agrees with plan.

## 2021-05-06 ENCOUNTER — TELEPHONE (OUTPATIENT)
Dept: INTERNAL MEDICINE CLINIC | Age: 76
End: 2021-05-06

## 2021-05-06 NOTE — TELEPHONE ENCOUNTER
5/6/2021. Spoke with patient. Having back pain. Would like PT. She has an appointment with jason Brown on Monday, May 10, 2020. Recommended - she talk with Dr. Ceci Yanes about PT and if he feels that it is appropriate, he will be able to order. she states understanding and agrees with plan.

## 2021-05-10 ENCOUNTER — TELEPHONE (OUTPATIENT)
Dept: INTERNAL MEDICINE CLINIC | Age: 76
End: 2021-05-10

## 2021-05-10 DIAGNOSIS — E03.9 HYPOTHYROIDISM, ADULT: Primary | ICD-10-CM

## 2021-05-10 DIAGNOSIS — Z79.899 ENCOUNTER FOR LONG-TERM (CURRENT) USE OF MEDICATIONS: ICD-10-CM

## 2021-05-10 DIAGNOSIS — E78.2 MIXED HYPERLIPIDEMIA: ICD-10-CM

## 2021-05-13 ENCOUNTER — TELEPHONE (OUTPATIENT)
Dept: INTERNAL MEDICINE CLINIC | Age: 76
End: 2021-05-13

## 2021-05-13 DIAGNOSIS — E03.9 HYPOTHYROIDISM, UNSPECIFIED TYPE: ICD-10-CM

## 2021-05-13 DIAGNOSIS — Z79.899 ENCOUNTER FOR LONG-TERM (CURRENT) USE OF MEDICATIONS: ICD-10-CM

## 2021-05-13 DIAGNOSIS — E78.2 MIXED HYPERLIPIDEMIA: Primary | ICD-10-CM

## 2021-05-20 ENCOUNTER — OFFICE VISIT (OUTPATIENT)
Dept: INTERNAL MEDICINE CLINIC | Age: 76
End: 2021-05-20
Payer: MEDICARE

## 2021-05-20 VITALS
DIASTOLIC BLOOD PRESSURE: 75 MMHG | OXYGEN SATURATION: 96 % | SYSTOLIC BLOOD PRESSURE: 128 MMHG | WEIGHT: 143.6 LBS | RESPIRATION RATE: 16 BRPM | HEIGHT: 63 IN | TEMPERATURE: 97.5 F | BODY MASS INDEX: 25.45 KG/M2 | HEART RATE: 64 BPM

## 2021-05-20 DIAGNOSIS — Z79.899 ENCOUNTER FOR LONG-TERM (CURRENT) USE OF MEDICATIONS: ICD-10-CM

## 2021-05-20 DIAGNOSIS — E03.9 HYPOTHYROIDISM, ADULT: ICD-10-CM

## 2021-05-20 DIAGNOSIS — M79.671 RIGHT FOOT PAIN: ICD-10-CM

## 2021-05-20 DIAGNOSIS — Z86.69 H/O SLEEP DISTURBANCE: ICD-10-CM

## 2021-05-20 DIAGNOSIS — F32.A DEPRESSION, UNSPECIFIED DEPRESSION TYPE: ICD-10-CM

## 2021-05-20 DIAGNOSIS — E78.2 MIXED HYPERLIPIDEMIA: Primary | ICD-10-CM

## 2021-05-20 PROCEDURE — G8419 CALC BMI OUT NRM PARAM NOF/U: HCPCS | Performed by: INTERNAL MEDICINE

## 2021-05-20 PROCEDURE — G8536 NO DOC ELDER MAL SCRN: HCPCS | Performed by: INTERNAL MEDICINE

## 2021-05-20 PROCEDURE — 99214 OFFICE O/P EST MOD 30 MIN: CPT | Performed by: INTERNAL MEDICINE

## 2021-05-20 PROCEDURE — G8427 DOCREV CUR MEDS BY ELIG CLIN: HCPCS | Performed by: INTERNAL MEDICINE

## 2021-05-20 PROCEDURE — G8399 PT W/DXA RESULTS DOCUMENT: HCPCS | Performed by: INTERNAL MEDICINE

## 2021-05-20 PROCEDURE — 3017F COLORECTAL CA SCREEN DOC REV: CPT | Performed by: INTERNAL MEDICINE

## 2021-05-20 PROCEDURE — G8510 SCR DEP NEG, NO PLAN REQD: HCPCS | Performed by: INTERNAL MEDICINE

## 2021-05-20 PROCEDURE — 1090F PRES/ABSN URINE INCON ASSESS: CPT | Performed by: INTERNAL MEDICINE

## 2021-05-20 PROCEDURE — 1101F PT FALLS ASSESS-DOCD LE1/YR: CPT | Performed by: INTERNAL MEDICINE

## 2021-05-20 PROCEDURE — G0463 HOSPITAL OUTPT CLINIC VISIT: HCPCS | Performed by: INTERNAL MEDICINE

## 2021-05-20 NOTE — PROGRESS NOTES
Assessment/Plan:     1. Mixed hyperlipidemia  -cholesterol improved with dietary modifications.   -history of heart CT with calcium score of 0. Done in 2019.   -continue low fat diet. 2. Hypothyroidism, adult  -TSH normal with current dose of levothyroxine  -continue levothyroxine 50mcg daily. 3. Depression, unspecified depression type  -patient states she is doing quite well with lexapro 10mg daily. Will continue this dosage. 4. Encounter for long-term (current) use of medications      5. Right foot pain  -continue PT  -has appointment with orthopedics for right foot evaluation upcoming. 6. H/O sleep disturbance  -has untreated sleep apnea. Now having progressive daytime fatigue  -she would like to see Dr. Celeste Sandoval. Which  I concur with this evaluation.   -has normal thyroid function. No orders of the defined types were placed in this encounter. Follow-up Disposition:     Follow up in 6 months       Subjective:      Kalpesh Perez is a 76 y.o. female who presents today for follow up of her depression, hypothyroid and hyperlipidemia.     -labs completed on 5/13/21  -for hyperlipidemia - diet controlled. Calcium score done on 10/16/2019 was zero. Since last visit:  -saw ortho - went to orthoPT. Was having foot pain which was causing her to limp and cause back pain. She has an appointment with ortho to evaluate her right foot. Having pain in right foot with wearing closed toe shoes.   -she has been tested for sleep apnea in the past.  It did indicate that she has mild sleep apnea. She was tested just prior to the [facility] quarantine. No treatment at this time. She states that she is now having significant daytime fatigue, mostly in afternoon. Her friend recommended she see Dr. Celeste Sandoval, neurology for evaluation and treatment. She would like to go.     Patient Care Team:  -Dr. Renny Dutton.   -Dr. Gracie Daniel - colorectal surgeon- colonoscopy     Current Outpatient Medications   Medication Sig Dispense Refill    ammonium lactate (AMLACTIN) 12 % topical cream RUB IN TO AFFECTED AREA WELL 280 g 0    escitalopram oxalate (Lexapro) 10 mg tablet Take 1 Tab by mouth daily. 90 Tab 1    levothyroxine (SYNTHROID) 50 mcg tablet TAKE ONE TABLET BY MOUTH DAILY BEFORE BREAKFAST WITH THE EXCEPTION OF2 DAYS PER WEEK SHE TAKES 2 TABLETS. 120 Tab 1    loratadine (CLARITIN) 10 mg tablet Take 1 Tab by mouth daily. (Patient taking differently: Take 10 mg by mouth daily as needed.) 90 Tab 2    estradiol (ESTRACE) 0.01 % (0.1 mg/gram) vaginal cream Indications: pt has not started Rx at this time. Review of Systems    Pertinent items are noted in HPI. Objective: Wt Readings from Last 3 Encounters:   01/04/21 147 lb 3.2 oz (66.8 kg)   06/18/20 144 lb (65.3 kg)   05/04/20 145 lb (65.8 kg)     BP Readings from Last 3 Encounters:   01/04/21 130/83   06/18/20 119/70   03/12/20 130/68     Visit Vitals  /75   Pulse 64   Temp 97.5 °F (36.4 °C) (Temporal)   Resp 16   Ht 5' 3\" (1.6 m)   Wt 143 lb 9.6 oz (65.1 kg)   SpO2 96%   BMI 25.44 kg/m²     General appearance: alert, cooperative, no distress, appears stated age  Head: Normocephalic, without obvious abnormality, atraumatic  Neck: supple, symmetrical, trachea midline, no adenopathy, thyroid: not enlarged, symmetric, no tenderness/mass/nodules, no carotid bruit and no JVD  Lungs: clear to auscultation bilaterally  Heart: regular rate and rhythm, S1, S2 normal, no murmur, click, rub or gallop  Extremities: extremities normal, atraumatic, no cyanosis or edema  Pulses: 2+ and symmetric              Disclaimer:  Return if symptoms worsen or fail to improve. Advised patient to call back or return to office if symptoms worsen/change/persist.     Discussed expected course/resolution/complications of diagnosis in detail with patient. Medication risks/benefits/costs/interactions/alternatives discussed with patient.    Patient was given an after visit summary which includes diagnoses, current medications, & vitals. Patient expressed understanding with the diagnosis and plan.

## 2021-07-05 DIAGNOSIS — L85.3 XEROSIS OF SKIN: ICD-10-CM

## 2021-07-06 RX ORDER — AMMONIUM LACTATE 12 G/100G
CREAM TOPICAL
Qty: 280 G | Refills: 0 | Status: SHIPPED | OUTPATIENT
Start: 2021-07-06 | End: 2021-11-16 | Stop reason: SDUPTHER

## 2021-08-02 RX ORDER — ESCITALOPRAM OXALATE 10 MG/1
TABLET ORAL
Qty: 90 TABLET | Refills: 0 | Status: SHIPPED | OUTPATIENT
Start: 2021-08-02 | End: 2021-09-01

## 2021-09-12 NOTE — TELEPHONE ENCOUNTER
----- Message from Mila Wooten sent at 4/15/2020  5:58 PM EDT -----  Regarding: Dr. Brittany Lawson Message/Vendor Calls    Caller's first and last name:  pt    Reason for call:  Requesting to speak with the provider or PA Joshua Go required yes/no and why:  yes    Best contact number(s):  [phone]    Details to clarify the request:  Pt requesting to speak with the provider or PA  in regards to same symptoms treated 03/2020. Pt stated, she has no energy over sleeping and feeling depressed. The last time this accord it was an issues with pt's thyroid.  This seems more so chemical related for these symptoms not psychological.  Mila Wooten Arm band on/IV intact

## 2021-09-28 RX ORDER — LEVOTHYROXINE SODIUM 50 UG/1
TABLET ORAL
Qty: 120 TABLET | Refills: 1 | Status: SHIPPED | OUTPATIENT
Start: 2021-09-28 | End: 2021-11-16 | Stop reason: SDUPTHER

## 2021-11-16 ENCOUNTER — OFFICE VISIT (OUTPATIENT)
Dept: INTERNAL MEDICINE CLINIC | Age: 76
End: 2021-11-16
Payer: MEDICARE

## 2021-11-16 VITALS
HEIGHT: 63 IN | WEIGHT: 145.2 LBS | RESPIRATION RATE: 16 BRPM | DIASTOLIC BLOOD PRESSURE: 80 MMHG | BODY MASS INDEX: 25.73 KG/M2 | HEART RATE: 60 BPM | SYSTOLIC BLOOD PRESSURE: 125 MMHG | TEMPERATURE: 97.1 F | OXYGEN SATURATION: 98 %

## 2021-11-16 DIAGNOSIS — E78.2 MIXED HYPERLIPIDEMIA: ICD-10-CM

## 2021-11-16 DIAGNOSIS — L85.3 XEROSIS OF SKIN: ICD-10-CM

## 2021-11-16 DIAGNOSIS — Z79.899 ENCOUNTER FOR LONG-TERM (CURRENT) USE OF MEDICATIONS: ICD-10-CM

## 2021-11-16 DIAGNOSIS — F32.A DEPRESSION, UNSPECIFIED DEPRESSION TYPE: ICD-10-CM

## 2021-11-16 DIAGNOSIS — Z23 NEEDS FLU SHOT: ICD-10-CM

## 2021-11-16 DIAGNOSIS — E03.9 HYPOTHYROIDISM, ADULT: Primary | ICD-10-CM

## 2021-11-16 PROCEDURE — 1090F PRES/ABSN URINE INCON ASSESS: CPT | Performed by: INTERNAL MEDICINE

## 2021-11-16 PROCEDURE — G8536 NO DOC ELDER MAL SCRN: HCPCS | Performed by: INTERNAL MEDICINE

## 2021-11-16 PROCEDURE — 90694 VACC AIIV4 NO PRSRV 0.5ML IM: CPT | Performed by: INTERNAL MEDICINE

## 2021-11-16 PROCEDURE — G0463 HOSPITAL OUTPT CLINIC VISIT: HCPCS | Performed by: INTERNAL MEDICINE

## 2021-11-16 PROCEDURE — G8427 DOCREV CUR MEDS BY ELIG CLIN: HCPCS | Performed by: INTERNAL MEDICINE

## 2021-11-16 PROCEDURE — 99214 OFFICE O/P EST MOD 30 MIN: CPT | Performed by: INTERNAL MEDICINE

## 2021-11-16 PROCEDURE — G8399 PT W/DXA RESULTS DOCUMENT: HCPCS | Performed by: INTERNAL MEDICINE

## 2021-11-16 PROCEDURE — G8419 CALC BMI OUT NRM PARAM NOF/U: HCPCS | Performed by: INTERNAL MEDICINE

## 2021-11-16 PROCEDURE — 1101F PT FALLS ASSESS-DOCD LE1/YR: CPT | Performed by: INTERNAL MEDICINE

## 2021-11-16 PROCEDURE — G8432 DEP SCR NOT DOC, RNG: HCPCS | Performed by: INTERNAL MEDICINE

## 2021-11-16 RX ORDER — AMMONIUM LACTATE 12 G/100G
CREAM TOPICAL
Qty: 280 G | Refills: 0 | Status: SHIPPED | OUTPATIENT
Start: 2021-11-16 | End: 2022-08-04

## 2021-11-16 RX ORDER — ESCITALOPRAM OXALATE 10 MG/1
10 TABLET ORAL DAILY
Qty: 90 TABLET | Refills: 1 | Status: SHIPPED | OUTPATIENT
Start: 2021-11-16

## 2021-11-16 RX ORDER — LEVOTHYROXINE SODIUM 50 UG/1
TABLET ORAL
Qty: 120 TABLET | Refills: 1 | Status: SHIPPED | OUTPATIENT
Start: 2021-11-16 | End: 2022-10-10

## 2021-11-16 RX ORDER — ESTRADIOL 0.1 MG/G
CREAM VAGINAL
Qty: 42.5 G | Refills: 1 | Status: SHIPPED | OUTPATIENT
Start: 2021-11-16 | End: 2022-05-16 | Stop reason: ALTCHOICE

## 2021-11-16 NOTE — PATIENT INSTRUCTIONS
Vaccine Information Statement    Influenza (Flu) Vaccine (Inactivated or Recombinant): What You Need to Know    Many vaccine information statements are available in English and other languages. See www.immunize.org/vis. Hojas de información sobre vacunas están disponibles en español y en muchos otros idiomas. Visite www.immunize.org/vis. 1. Why get vaccinated? Influenza vaccine can prevent influenza (flu). Flu is a contagious disease that spreads around the [facility] every year, usually between October and May. Anyone can get the flu, but it is more dangerous for some people. Infants and young children, people 72 years and older, pregnant people, and people with certain health conditions or a weakened immune system are at greatest risk of flu complications. Pneumonia, bronchitis, sinus infections, and ear infections are examples of flu-related complications. If you have a medical condition, such as heart disease, cancer, or diabetes, flu can make it worse. Flu can cause fever and chills, sore throat, muscle aches, fatigue, cough, headache, and runny or stuffy nose. Some people may have vomiting and diarrhea, though this is more common in children than adults. In an average year, thousands of people in the [facility] die from flu, and many more are hospitalized. Flu vaccine prevents millions of illnesses and flu-related visits to the doctor each year. 2. Influenza vaccines     CDC recommends everyone 6 months and older get vaccinated every flu season. Children 6 months through 6years of age may need 2 doses during a single flu season. Everyone else needs only 1 dose each flu season. It takes about 2 weeks for protection to develop after vaccination. There are many flu viruses, and they are always changing. Each year a new flu vaccine is made to protect against the influenza viruses believed to be likely to cause disease in the upcoming flu season.  Even when the vaccine doesnt exactly match these viruses, it may still provide some protection. Influenza vaccine does not cause flu. Influenza vaccine may be given at the same time as other vaccines. 3. Talk with your health care provider    Tell your vaccination provider if the person getting the vaccine:   Has had an allergic reaction after a previous dose of influenza vaccine, or has any severe, life-threatening allergies    Has ever had Guillain-Barré Syndrome (also called GBS)    In some cases, your health care provider may decide to postpone influenza vaccination until a future visit. Influenza vaccine can be administered at any time during pregnancy. People who are or will be pregnant during influenza season should receive inactivated influenza vaccine. People with minor illnesses, such as a cold, may be vaccinated. People who are moderately or severely ill should usually wait until they recover before getting influenza vaccine. Your health care provider can give you more information. 4. Risks of a vaccine reaction     Soreness, redness, and swelling where the shot is given, fever, muscle aches, and headache can happen after influenza vaccination.  There may be a very small increased risk of Guillain-Barré Syndrome (GBS) after inactivated influenza vaccine (the flu shot). [facility] children who get the flu shot along with pneumococcal vaccine (PCV13) and/or DTaP vaccine at the same time might be slightly more likely to have a seizure caused by fever. Tell your health care provider if a child who is getting flu vaccine has ever had a seizure. People sometimes faint after medical procedures, including vaccination. Tell your provider if you feel dizzy or have vision changes or ringing in the ears. As with any medicine, there is a very remote chance of a vaccine causing a severe allergic reaction, other serious injury, or death. 5. What if there is a serious problem?     An allergic reaction could occur after the vaccinated person leaves the clinic. If you see signs of a severe allergic reaction (hives, swelling of the face and throat, difficulty breathing, a fast heartbeat, dizziness, or weakness), call 9-1-1 and get the person to the nearest hospital.    For other signs that concern you, call your health care provider. Adverse reactions should be reported to the Vaccine Adverse Event Reporting System (VAERS). Your health care provider will usually file this report, or you can do it yourself. Visit the VAERS website at www.vaers. [facility].gov or call 0-392.438.2580. VAERS is only for reporting reactions, and VAERS staff members do not give medical advice. 6. The National Vaccine Injury Compensation Program    The formerly [facility] Vaccine Injury Compensation Program (VICP) is a federal program that was created to compensate people who may have been injured by certain vaccines. Claims regarding alleged injury or death due to vaccination have a time limit for filing, which may be as short as two years. Visit the VICP website at www.[facility].gov/vaccinecompensation or call 1-438.940.4834 to learn about the program and about filing a claim. 7. How can I learn more?  Ask your health care provider.  Call your local or state health department.  Visit the website of the Food and Drug Administration (FDA) for vaccine package inserts and additional information at www.fda.gov/vaccines-blood-biologics/vaccines.  Contact the Centers for Disease Control and Prevention (CDC):  - Call 4-530.234.6698 (1-800-CDC-INFO) or  - Visit CDCs influenza website at www.cdc.gov/flu. Vaccine Information Statement   Inactivated Influenza Vaccine   8/6/2021  42 DAGOBERTO Bernabe [id number]-[id number]   Department of Health and Human Services  Centers for Disease Control and Prevention    Office Use Only

## 2021-11-16 NOTE — PROGRESS NOTES
Assessment/Plan:     1. Hypothyroidism, adult  -appears clinically euthyroid  -compliant with use of levothyroxine.     - CBC WITH AUTOMATED DIFF  - METABOLIC PANEL, COMPREHENSIVE  - TSH 3RD GENERATION  - T4, FREE    2. Depression, unspecified depression type  -I evaluated and recommended to continue current doses of medications. 3. Mixed hyperlipidemia  -need fasting lipid panel today. - METABOLIC PANEL, COMPREHENSIVE  - LIPID PANEL    4. Encounter for long-term (current) use of medications      5. Needs flu shot  - Patient received flu vaccine today without any complications.     - FLU (FLUAD QUAD INFLUENZA VACCINE,QUAD,ADJUVANTED)    6. Xerosis of skin  -refilled amlactin    - ammonium lactate (AMLACTIN) 12 % topical cream; rub in to affected area well  Dispense: 280 g; Refill: 0     Orders Placed This Encounter    Influenza Vaccine, QUAD, 65 Yrs +  IM  (Fluad 93891 )    CBC WITH AUTOMATED DIFF    METABOLIC PANEL, COMPREHENSIVE    TSH 3RD GENERATION    T4, FREE    LIPID PANEL    escitalopram oxalate (LEXAPRO) 10 mg tablet     Sig: Take 1 Tablet by mouth daily. Dispense:  90 Tablet     Refill:  1    levothyroxine (SYNTHROID) 50 mcg tablet     Sig: TAKE ONE TABLET BY MOUTH DAILY BEFORE BREAKFAST WITH THE EXCEPTION OF 2 DAYS PER WEEK SHE TAKES 2 TABLETS. Dispense:  120 Tablet     Refill:  1    estradioL (ESTRACE) 0.01 % (0.1 mg/gram) vaginal cream     Si gram PV 1-3 times per week  Indications: pt has not started Rx at this time. Dispense:  42.5 g     Refill:  1    ammonium lactate (AMLACTIN) 12 % topical cream     Sig: rub in to affected area well     Dispense:  280 g     Refill:  0        Follow-up Disposition:       Follow up in 6 months             Subjective:      Francine Sage is a 68 y.o. female who presents today for follow up of her hypothyroid, hyperlipidemia and depression. Since last visit 2021:  -hyperlipidemia - diet controlled.   Calcium score done on 10/16/2019 was zero. -no new concerns. Patient Care Team:  -Dr. Quang Whitman.   -Dr. Mcdermott Given - colorectal surgeon- colonoscopy     Due for:         Objective: Wt Readings from Last 3 Encounters:   05/20/21 143 lb 9.6 oz (65.1 kg)   01/04/21 147 lb 3.2 oz (66.8 kg)   06/18/20 144 lb (65.3 kg)     BP Readings from Last 3 Encounters:   05/20/21 128/75   01/04/21 130/83   06/18/20 119/70     Visit Vitals  /80   Pulse 60   Temp 97.1 °F (36.2 °C) (Temporal)   Resp 16   Ht 5' 3\" (1.6 m)   Wt 145 lb 3.2 oz (65.9 kg)   SpO2 98%   BMI 25.72 kg/m²     General appearance: alert, cooperative, no distress, appears stated age  Head: Normocephalic, without obvious abnormality, atraumatic  Neck: supple, symmetrical, trachea midline, no adenopathy, thyroid: not enlarged, symmetric, no tenderness/mass/nodules, no carotid bruit and no JVD  Lungs: clear to auscultation bilaterally  Heart: regular rate and rhythm, S1, S2 normal, no murmur, click, rub or gallop  Abdomen: soft, non-tender. Bowel sounds normal. No masses,  no organomegaly  Extremities: extremities normal, atraumatic, no cyanosis or edema  Pulses: 2+ and symmetric              Disclaimer:  Return if symptoms worsen or fail to improve. Advised patient to call back or return to office if symptoms worsen/change/persist.     Discussed expected course/resolution/complications of diagnosis in detail with patient. Medication risks/benefits/costs/interactions/alternatives discussed with patient. Patient was given an after visit summary which includes diagnoses, current medications, & vitals. Patient expressed understanding with the diagnosis and plan.

## 2021-11-17 LAB
ALBUMIN SERPL-MCNC: 4.4 G/DL (ref 3.7–4.7)
ALBUMIN/GLOB SERPL: 2 {RATIO} (ref 1.2–2.2)
ALP SERPL-CCNC: 65 IU/L (ref 44–121)
ALT SERPL-CCNC: 17 IU/L (ref 0–32)
AST SERPL-CCNC: 23 IU/L (ref 0–40)
BASOPHILS # BLD AUTO: 0 X10E3/UL (ref 0–0.2)
BASOPHILS NFR BLD AUTO: 1 %
BILIRUB SERPL-MCNC: 0.5 MG/DL (ref 0–1.2)
BUN SERPL-MCNC: 14 MG/DL (ref 8–27)
BUN/CREAT SERPL: 18 (ref 12–28)
CALCIUM SERPL-MCNC: 9.2 MG/DL (ref 8.7–10.3)
CHLORIDE SERPL-SCNC: 104 MMOL/L (ref 96–106)
CHOLEST SERPL-MCNC: 254 MG/DL (ref 100–199)
CO2 SERPL-SCNC: 24 MMOL/L (ref 20–29)
CREAT SERPL-MCNC: 0.79 MG/DL (ref 0.57–1)
EOSINOPHIL # BLD AUTO: 0.1 X10E3/UL (ref 0–0.4)
EOSINOPHIL NFR BLD AUTO: 1 %
ERYTHROCYTE [DISTWIDTH] IN BLOOD BY AUTOMATED COUNT: 12.8 % (ref 11.7–15.4)
GLOBULIN SER CALC-MCNC: 2.2 G/DL (ref 1.5–4.5)
GLUCOSE SERPL-MCNC: 87 MG/DL (ref 65–99)
HCT VFR BLD AUTO: 43.5 % (ref 34–46.6)
HDLC SERPL-MCNC: 51 MG/DL
HGB BLD-MCNC: 14.7 G/DL (ref 11.1–15.9)
IMM GRANULOCYTES # BLD AUTO: 0 X10E3/UL (ref 0–0.1)
IMM GRANULOCYTES NFR BLD AUTO: 0 %
LDLC SERPL CALC-MCNC: 184 MG/DL (ref 0–99)
LYMPHOCYTES # BLD AUTO: 1.5 X10E3/UL (ref 0.7–3.1)
LYMPHOCYTES NFR BLD AUTO: 35 %
MCH RBC QN AUTO: 30.6 PG (ref 26.6–33)
MCHC RBC AUTO-ENTMCNC: 33.8 G/DL (ref 31.5–35.7)
MCV RBC AUTO: 91 FL (ref 79–97)
MONOCYTES # BLD AUTO: 0.4 X10E3/UL (ref 0.1–0.9)
MONOCYTES NFR BLD AUTO: 9 %
NEUTROPHILS # BLD AUTO: 2.3 X10E3/UL (ref 1.4–7)
NEUTROPHILS NFR BLD AUTO: 54 %
PLATELET # BLD AUTO: 234 X10E3/UL (ref 150–450)
POTASSIUM SERPL-SCNC: 4.5 MMOL/L (ref 3.5–5.2)
PROT SERPL-MCNC: 6.6 G/DL (ref 6–8.5)
RBC # BLD AUTO: 4.8 X10E6/UL (ref 3.77–5.28)
SODIUM SERPL-SCNC: 141 MMOL/L (ref 134–144)
T4 FREE SERPL-MCNC: 1.2 NG/DL (ref 0.82–1.77)
TRIGL SERPL-MCNC: 107 MG/DL (ref 0–149)
TSH SERPL DL<=0.005 MIU/L-ACNC: 1.87 UIU/ML (ref 0.45–4.5)
VLDLC SERPL CALC-MCNC: 19 MG/DL (ref 5–40)
WBC # BLD AUTO: 4.2 X10E3/UL (ref 3.4–10.8)

## 2021-11-22 NOTE — PROGRESS NOTES
Cholesterol has increased. Continue to maintain low fat diet. Recommend Calcium CT again next year, 2022.  Letter sent 11/22/2021

## 2022-01-04 ENCOUNTER — TELEPHONE (OUTPATIENT)
Dept: INTERNAL MEDICINE CLINIC | Age: 77
End: 2022-01-04

## 2022-01-04 NOTE — TELEPHONE ENCOUNTER
Pt is having COVID symptoms - respiratory issues. She was exposed to her daughter who tested positive when she was staying with her for a week. They had close contact in this week with no masks. Sunday was exposure, tested Monday and tested negative. She is wondering if you could call back with her next steps + what to look out for especially with her increasing symptoms.      Jericho Lerner (Self) [phone] [place])

## 2022-01-04 NOTE — TELEPHONE ENCOUNTER
Patient advised that its recommended to have covid testing 3-5 days after exposure. She states that recommendation didn't sound right. I advised her to visit the cdc web site for further research of her own. She wanted recommendations on what to take for symptoms and I advised her that I am not able to give recommendations over the phone. I recommended she reports to urgent care for evaluation.

## 2022-02-15 ENCOUNTER — TELEPHONE (OUTPATIENT)
Dept: INTERNAL MEDICINE CLINIC | Age: 77
End: 2022-02-15

## 2022-02-15 DIAGNOSIS — E78.2 MIXED HYPERLIPIDEMIA: Primary | ICD-10-CM

## 2022-02-15 DIAGNOSIS — I10 HYPERTENSION, UNSPECIFIED TYPE: ICD-10-CM

## 2022-02-15 NOTE — TELEPHONE ENCOUNTER
----- Message from Corrinne Ona sent at 2/15/2022 12:38 PM EST -----  Subject: Message to Provider    QUESTIONS  Information for Provider? Patient spoke with provider about getting a   heart CT done and would like to be contacted to schedule.   ---------------------------------------------------------------------------  --------------  CALL BACK INFO  What is the best way for the office to contact you? OK to leave message on   voicemail  Preferred Call Back Phone Number? [phone]  ---------------------------------------------------------------------------  --------------  SCRIPT ANSWERS  Relationship to Patient?  Self

## 2022-02-15 NOTE — TELEPHONE ENCOUNTER
Pt returning Dayna's call. Pt states that she is available today (02/15) until 4:30, but then she has a pt.     Call back number:  [phone]

## 2022-02-16 NOTE — TELEPHONE ENCOUNTER
Left a detailed message with the patient. Advising her of the central scheduling number to call and schedule the heart CT scan, and that the scan has went up form $99 to $115. Advised the patient that if she had any further questions to feel free to give me a call back.

## 2022-02-16 NOTE — TELEPHONE ENCOUNTER
Left a detailed message with the patient. Advising her of the central scheduling number to call and schedule the heart CT scan, and that the scan has went up form $99 to $115. Advised patient that if she had any further questions to feel free to give me a call back.

## 2022-03-09 ENCOUNTER — HOSPITAL ENCOUNTER (OUTPATIENT)
Dept: CT IMAGING | Age: 77
Discharge: HOME | End: 2022-03-09
Attending: INTERNAL MEDICINE
Payer: MEDICARE

## 2022-03-09 DIAGNOSIS — E78.2 MIXED HYPERLIPIDEMIA: ICD-10-CM

## 2022-03-09 PROCEDURE — 75571 CT HRT W/O DYE W/CA TEST: CPT

## 2022-03-15 ENCOUNTER — TELEPHONE (OUTPATIENT)
Dept: INTERNAL MEDICINE CLINIC | Age: 77
End: 2022-03-15

## 2022-03-15 DIAGNOSIS — E78.00 HYPERCHOLESTEREMIA: ICD-10-CM

## 2022-03-15 NOTE — TELEPHONE ENCOUNTER
Reason for call:    The patient would like her heart scan results    Is this a new problem: yes     Date of last appointment:  11/16/2021     Can we respond via [employer]: no    Best call back number:  [phone]

## 2022-03-16 NOTE — TELEPHONE ENCOUNTER
3/16/2022 spoke with patient.     -informed that her Calcium score noted on heart CT done on 3/9/2022 was 0.     -cholesterol elevated, will continue to hold use of statin. she states understanding and agrees with plan.

## 2022-03-19 PROBLEM — Z92.89 H/O SCREENING MAMMOGRAPHY: Status: ACTIVE | Noted: 2019-09-26

## 2022-03-20 PROBLEM — G47.33 OSA (OBSTRUCTIVE SLEEP APNEA): Status: ACTIVE | Noted: 2020-03-03

## 2022-03-24 ENCOUNTER — TRANSCRIBE ORDER (OUTPATIENT)
Dept: SCHEDULING | Age: 77
End: 2022-03-24

## 2022-03-24 DIAGNOSIS — Z12.31 VISIT FOR SCREENING MAMMOGRAM: Primary | ICD-10-CM

## 2022-05-06 ENCOUNTER — HOSPITAL ENCOUNTER (OUTPATIENT)
Dept: MAMMOGRAPHY | Age: 77
Discharge: HOME | End: 2022-05-06
Attending: INTERNAL MEDICINE
Payer: MEDICARE

## 2022-05-06 DIAGNOSIS — Z12.31 VISIT FOR SCREENING MAMMOGRAM: ICD-10-CM

## 2022-05-06 PROCEDURE — 77063 BREAST TOMOSYNTHESIS BI: CPT

## 2022-05-15 NOTE — PROGRESS NOTES
Assessment/Plan:     1. Mixed hyperlipidemia  -encouraged her to maintain low fat diet.   -not on statin. Calcium score of 0 done in 2019    - CBC WITH AUTOMATED DIFF; Future  - METABOLIC PANEL, COMPREHENSIVE; Future  - CBC WITH AUTOMATED DIFF  - METABOLIC PANEL, COMPREHENSIVE    2. Hypothyroidism, adult  -appears clinically euthyroid.   -taking levothyroxine correctly.   -checkl TSH levels today. - CBC WITH AUTOMATED DIFF; Future  - METABOLIC PANEL, COMPREHENSIVE; Future  - TSH 3RD GENERATION; Future  - T4, FREE; Future  - CBC WITH AUTOMATED DIFF  - METABOLIC PANEL, COMPREHENSIVE  - TSH 3RD GENERATION  - T4, FREE    3. Depression, unspecified depression type  -very stable. Continue current dose of lexapro. 4. Encounter for long-term (current) use of medications      5. Medicare annual wellness visit, subsequent  -completed today.   -[facility] decision maker reviewed with patient and updated. - DEPRESSION SCREEN ANNUAL    6. Screening for depression    - DEPRESSION SCREEN ANNUAL     Orders Placed This Encounter    ANNUAL DEPRESSION SCREEN 8-15 MIN    CBC WITH AUTOMATED DIFF     Standing Status:   Future     Number of Occurrences:   1     Standing Expiration Date:   7/29/6459    METABOLIC PANEL, COMPREHENSIVE     Standing Status:   Future     Number of Occurrences:   1     Standing Expiration Date:   5/16/2023    TSH 3RD GENERATION     Standing Status:   Future     Number of Occurrences:   1     Standing Expiration Date:   5/16/2023    T4, FREE     Standing Status:   Future     Number of Occurrences:   1     Standing Expiration Date:   5/16/2023        Follow-up Disposition:     Follow up in 6 months               Subjective:      Ellin Cogan is a 68 y.o. female who presents today for her Medicare Wellness Exam and follow up of her hypothyroid, depression, and hyperlipidemia    Since last visit 11/16/2021:  -cholesterol is diet controlled. Last calcium score done 10/2019 was zero. Patient Care Team:  -Dr. Caroline Longoria.   -Dr. Christopher Bean - colorectal surgeon- colonoscopy              Objective: Wt Readings from Last 3 Encounters:   11/16/21 145 lb 3.2 oz (65.9 kg)   05/20/21 143 lb 9.6 oz (65.1 kg)   01/04/21 147 lb 3.2 oz (66.8 kg)     BP Readings from Last 3 Encounters:   11/16/21 125/80   05/20/21 128/75   01/04/21 130/83     Visit Vitals  /78 (BP 1 Location: Left arm, BP Patient Position: Sitting, BP Cuff Size: Adult)   Pulse 91   Temp 97.3 °F (36.3 °C) (Temporal)   Resp 16   Ht 5' 3\" (1.6 m)   Wt 147 lb (66.7 kg)   SpO2 96%   BMI 26.04 kg/m²     General appearance: alert, cooperative, no distress, appears stated age  Head: Normocephalic, without obvious abnormality, atraumatic  Neck: supple, symmetrical, trachea midline, no adenopathy, thyroid: not enlarged, symmetric, no tenderness/mass/nodules, no carotid bruit and no JVD  Lungs: clear to auscultation bilaterally  Heart: regular rate and rhythm, S1, S2 normal, no murmur, click, rub or gallop  Abdomen: soft, non-tender. Bowel sounds normal. No masses,  no organomegaly  Extremities: extremities normal, atraumatic, no cyanosis or edema  Pulses: 2+ and symmetric              Disclaimer:  Return if symptoms worsen or fail to improve. Advised patient to call back or return to office if symptoms worsen/change/persist.     Discussed expected course/resolution/complications of diagnosis in detail with patient. Medication risks/benefits/costs/interactions/alternatives discussed with patient. Patient was given an after visit summary which includes diagnoses, current medications, & vitals. Patient expressed understanding with the diagnosis and plan. This is the Subsequent Medicare Annual Wellness Exam, performed 12 months or more after the Initial AWV or the last Subsequent AWV    I have reviewed the patient's medical history in detail and updated the computerized patient record.        Assessment/Plan   Education and counseling provided:  Are appropriate based on today's review and evaluation    1. Medicare annual wellness visit, subsequent  -     [facility]  2. Mixed hyperlipidemia  3. Hypothyroidism, adult  4. Depression, unspecified depression type  5. Encounter for long-term (current) use of medications  6. Screening for depression  -     DEPRESSION SCREEN ANNUAL       Depression Risk Factor Screening     3 most recent PHQ Screens 5/16/2022   PHQ Not Done -   Little interest or pleasure in doing things Not at all   Feeling down, depressed, irritable, or hopeless Not at all   Total Score PHQ 2 0       Alcohol & Drug Abuse Risk Screen    Do you average more than 1 drink per night or more than 7 drinks a week:  No    On any one occasion in the past three months have you have had more than 3 drinks containing alcohol:  No          Functional Ability and Level of Safety    Hearing: Hearing is good. Activities of Daily Living: The home contains: handrails  Patient does total self care      Ambulation: with no difficulty     Fall Risk:  Fall Risk Assessment, last 12 mths 5/16/2022   Able to walk? Yes   Fall in past 12 months? 0   Do you feel unsteady?  0   Are you worried about falling 0      Abuse Screen:  Patient is not abused       Cognitive Screening    Has your family/caregiver stated any concerns about your memory: no         Health Maintenance Due     Health Maintenance Due   Topic Date Due    Depression Monitoring  05/20/2022       Patient Care Team   Patient Care Team:  Matilda Castro MD as PCP - General (Internal Medicine Physician)  Matilda Castro MD as PCP - [facility]  Vy Caceres MD (Otolaryngology)  Dominick Smith MD (Otolaryngology)  Italia Jin MD (Dermatology Physician)  Doug Lowe MD (Ophthalmology)    History     Patient Active Problem List   Diagnosis Code    Acquired hypothyroidism E03.9    Hypercholesteremia E78.00    History of colonoscopy Z98.890    Environmental allergies Z91.09    Xerosis of skin L85.3    Vitamin D deficiency E55.9    Osteopenia M85.80    Adjustment disorder F43.20    Migraine headache G43.909    Family history of breast cancer in first degree relative Z80.3    H/O screening mammography Z92.89    HALEY (obstructive sleep apnea) G47.33     Past Medical History:   Diagnosis Date    Adverse effect of anesthesia     SLOW WAKING PAST ANESTHESIA, SEVERE HEADACHE    Depression     Headache(784.0)     migraines    Hypercholesterolemia     Hypothyroid     Nephrolithiasis     Scoliosis       Past Surgical History:   Procedure Laterality Date    ENDOSCOPY, COLON, DIAGNOSTIC  2009    neg. ([place])-f/u 5 yrs.  HX CATARACT REMOVAL Right 06/2019    HX DILATION AND CURETTAGE  1986    benign    HX GI      COLONOSCOPY    HX ORTHOPAEDIC Right 01/17/2019    ACL replacement     HX TONSILLECTOMY  childhood    T&A     Current Outpatient Medications   Medication Sig Dispense Refill    escitalopram oxalate (LEXAPRO) 10 mg tablet Take 1 Tablet by mouth daily. 90 Tablet 1    levothyroxine (SYNTHROID) 50 mcg tablet TAKE ONE TABLET BY MOUTH DAILY BEFORE BREAKFAST WITH THE EXCEPTION OF 2 DAYS PER WEEK SHE TAKES 2 TABLETS. 120 Tablet 1    ammonium lactate (AMLACTIN) 12 % topical cream rub in to affected area well 280 g 0    estradioL (ESTRACE) 0.01 % (0.1 mg/gram) vaginal cream 1 gram PV 1-3 times per week  Indications: pt has not started Rx at this time. (Patient not taking: Reported on 5/16/2022) 42.5 g 1    loratadine (CLARITIN) 10 mg tablet Take 1 Tab by mouth daily.  (Patient taking differently: Take 10 mg by mouth daily as needed.) 90 Tab 2     Allergies   Allergen Reactions    Talwin [Pentazocine Lactate] Unknown (comments)     Altered sensorium    Amoxicillin Other (comments)     Diarrhea, stomach upset    Darvon [Propoxyphene] Unknown (comments)    Diamox Other (comments)     migraine    Morphine Other (comments)     Pt states she is very sensitive to all narcotics, feels like she will pass out    Simvastatin Other (comments)     Headache    Zithromax [Azithromycin] Other (comments)     depression       Family History   Problem Relation Age of Onset    Hypertension Mother     Heart Disease Mother 61        CHF/etoh    Arrhythmia Mother     Heart Failure Mother     Diabetes Father     Coronary Art Dis Father 61        CABG (twice)    Heart Disease Father     Heart Surgery Father     Breast Cancer Sister 61    Cancer Sister         BREAST    Heart Disease Brother     Anesth Problems Neg Hx      Social History     Tobacco Use    Smoking status: Former Smoker     Packs/day: 0.50     Years: 10.00     Pack years: 5.00     Types: Cigarettes     Quit date: 1970     Years since quittin.3    Smokeless tobacco: Never Used   Substance Use Topics    Alcohol use:  Yes     Alcohol/week: 1.0 - 2.0 standard drink     Types: 1 Shots of liquor per week     Comment: OCCASIONALLY         Angelica Villafana MD

## 2022-05-16 ENCOUNTER — OFFICE VISIT (OUTPATIENT)
Dept: INTERNAL MEDICINE CLINIC | Age: 77
End: 2022-05-16
Payer: MEDICARE

## 2022-05-16 VITALS
WEIGHT: 147 LBS | HEART RATE: 91 BPM | RESPIRATION RATE: 16 BRPM | OXYGEN SATURATION: 96 % | HEIGHT: 63 IN | TEMPERATURE: 97.3 F | BODY MASS INDEX: 26.05 KG/M2 | SYSTOLIC BLOOD PRESSURE: 117 MMHG | DIASTOLIC BLOOD PRESSURE: 78 MMHG

## 2022-05-16 DIAGNOSIS — E78.2 MIXED HYPERLIPIDEMIA: ICD-10-CM

## 2022-05-16 DIAGNOSIS — Z79.899 ENCOUNTER FOR LONG-TERM (CURRENT) USE OF MEDICATIONS: ICD-10-CM

## 2022-05-16 DIAGNOSIS — Z00.00 MEDICARE ANNUAL WELLNESS VISIT, SUBSEQUENT: Primary | ICD-10-CM

## 2022-05-16 DIAGNOSIS — E03.9 HYPOTHYROIDISM, ADULT: ICD-10-CM

## 2022-05-16 DIAGNOSIS — F32.A DEPRESSION, UNSPECIFIED DEPRESSION TYPE: ICD-10-CM

## 2022-05-16 DIAGNOSIS — Z13.31 SCREENING FOR DEPRESSION: ICD-10-CM

## 2022-05-16 PROCEDURE — G8754 DIAS BP LESS 90: HCPCS | Performed by: INTERNAL MEDICINE

## 2022-05-16 PROCEDURE — G8427 DOCREV CUR MEDS BY ELIG CLIN: HCPCS | Performed by: INTERNAL MEDICINE

## 2022-05-16 PROCEDURE — G0444 DEPRESSION SCREEN ANNUAL: HCPCS | Performed by: INTERNAL MEDICINE

## 2022-05-16 PROCEDURE — G8399 PT W/DXA RESULTS DOCUMENT: HCPCS | Performed by: INTERNAL MEDICINE

## 2022-05-16 PROCEDURE — G8536 NO DOC ELDER MAL SCRN: HCPCS | Performed by: INTERNAL MEDICINE

## 2022-05-16 PROCEDURE — G8419 CALC BMI OUT NRM PARAM NOF/U: HCPCS | Performed by: INTERNAL MEDICINE

## 2022-05-16 PROCEDURE — 99212 OFFICE O/P EST SF 10 MIN: CPT | Performed by: INTERNAL MEDICINE

## 2022-05-16 PROCEDURE — G8510 SCR DEP NEG, NO PLAN REQD: HCPCS | Performed by: INTERNAL MEDICINE

## 2022-05-16 PROCEDURE — 1090F PRES/ABSN URINE INCON ASSESS: CPT | Performed by: INTERNAL MEDICINE

## 2022-05-16 PROCEDURE — G8752 SYS BP LESS 140: HCPCS | Performed by: INTERNAL MEDICINE

## 2022-05-16 PROCEDURE — G0463 HOSPITAL OUTPT CLINIC VISIT: HCPCS | Performed by: INTERNAL MEDICINE

## 2022-05-16 PROCEDURE — 1101F PT FALLS ASSESS-DOCD LE1/YR: CPT | Performed by: INTERNAL MEDICINE

## 2022-05-16 PROCEDURE — G0439 PPPS, SUBSEQ VISIT: HCPCS | Performed by: INTERNAL MEDICINE

## 2022-05-16 NOTE — PROGRESS NOTES
Verified name and birth date for privacy precautions. Chart reviewed in preparation for today's visit. Chief Complaint   Patient presents with    Thyroid Problem          Health Maintenance Due   Topic    Medicare Yearly Exam     Depression Monitoring          Wt Readings from Last 3 Encounters:   05/16/22 147 lb (66.7 kg)   11/16/21 145 lb 3.2 oz (65.9 kg)   05/20/21 143 lb 9.6 oz (65.1 kg)     Temp Readings from Last 3 Encounters:   05/16/22 97.3 °F (36.3 °C) (Temporal)   11/16/21 97.1 °F (36.2 °C) (Temporal)   05/20/21 97.5 °F (36.4 °C) (Temporal)     BP Readings from Last 3 Encounters:   05/16/22 117/78   11/16/21 125/80   05/20/21 128/75     Pulse Readings from Last 3 Encounters:   05/16/22 91   11/16/21 60   05/20/21 64         Learning Assessment:  :     Learning Assessment 9/13/2019 5/15/2017 6/22/2015 2/17/2014 4/18/2013   PRIMARY LEARNER Patient Patient Patient Patient Patient   HIGHEST LEVEL OF EDUCATION - PRIMARY LEARNER  > 4 YEARS OF COLLEGE GRADUATED HIGH SCHOOL OR GED > 4 YEARS OF COLLEGE > 4 YEARS OF COLLEGE -   BARRIERS PRIMARY LEARNER NONE - NONE NONE -   CO-LEARNER CAREGIVER No - No No -   PRIMARY LANGUAGE ENGLISH ENGLISH ENGLISH ENGLISH ENGLISH    NEED - - No No -   LEARNER PREFERENCE PRIMARY DEMONSTRATION DEMONSTRATION DEMONSTRATION LISTENING LISTENING     - - - DEMONSTRATION PICTURES   LEARNING SPECIAL TOPICS - - no no -   ANSWERED BY patient  Patient  patient patient patient   RELATIONSHIP SELF SELF SELF SELF SELF   ASSESSMENT COMMENT - - none none -       Depression Screening:  :     3 most recent PHQ Screens 5/16/2022   PHQ Not Done -   Little interest or pleasure in doing things Not at all   Feeling down, depressed, irritable, or hopeless Not at all   Total Score PHQ 2 0       Fall Risk Assessment:  :     Fall Risk Assessment, last 12 mths 5/16/2022   Able to walk? Yes   Fall in past 12 months? 0   Do you feel unsteady?  0   Are you worried about falling 0       Abuse Screening:  :     Abuse Screening Questionnaire 5/16/2022 3/3/2020 9/13/2019 5/15/2017 6/22/2015 4/1/2014 2/17/2014   Do you ever feel afraid of your partner? N N N N N N N   Are you in a relationship with someone who physically or mentally threatens you? N N N N N N N   Is it safe for you to go home?  [place] Score

## 2022-05-16 NOTE — PATIENT INSTRUCTIONS
Medicare Wellness Visit, Female     The best way to live healthy is to have a lifestyle where you eat a well-balanced diet, exercise regularly, limit alcohol use, and quit all forms of tobacco/nicotine, if applicable. Regular preventive services are another way to keep healthy. Preventive services (vaccines, screening tests, monitoring & exams) can help personalize your care plan, which helps you manage your own care. Screening tests can find health problems at the earliest stages, when they are easiest to treat. [facility] follows the current, evidence-based guidelines published by the [facility] Leoncio Bazzi ([facility]) when recommending preventive services for our patients. Because we follow these guidelines, sometimes recommendations change over time as research supports it. (For example, mammograms used to be recommended annually. Even though Medicare will still pay for an annual mammogram, the newer guidelines recommend a mammogram every two years for women of average risk). Of course, you and your doctor may decide to screen more often for some diseases, based on your risk and your co-morbidities (chronic disease you are already diagnosed with). Preventive services for you include:  - Medicare offers their members a free annual wellness visit, which is time for you and your primary care provider to discuss and plan for your preventive service needs. Take advantage of this benefit every year!  -All adults over the age of 72 should receive the recommended pneumonia vaccines. Current USPSTF guidelines recommend a series of two vaccines for the best pneumonia protection.   -All adults should have a flu vaccine yearly and a tetanus vaccine every 10 years.   -All adults age 48 and older should receive the shingles vaccines (series of two vaccines).       -All adults age 38-68 who are overweight should have a diabetes screening test once every three years.   -All adults born between [birth year] and 1965 should be screened once for Hepatitis C.  -Other screening tests and preventive services for persons with diabetes include: an eye exam to screen for diabetic retinopathy, a kidney function test, a foot exam, and stricter control over your cholesterol.   -Cardiovascular screening for adults with routine risk involves an electrocardiogram (ECG) at intervals determined by your doctor.   -Colorectal cancer screenings should be done for adults age 54-65 with no increased risk factors for colorectal cancer. There are a number of acceptable methods of screening for this type of cancer. Each test has its own benefits and drawbacks. Discuss with your doctor what is most appropriate for you during your annual wellness visit. The different tests include: colonoscopy (considered the best screening method), a fecal occult blood test, a fecal DNA test, and sigmoidoscopy.    -A bone mass density test is recommended when a woman turns 65 to screen for osteoporosis. This test is only recommended one time, as a screening. Some providers will use this same test as a disease monitoring tool if you already have osteoporosis. -Breast cancer screenings are recommended every other year for women of normal risk, age 54-69.  -Cervical cancer screenings for women over age 72 are only recommended with certain risk factors.

## 2022-05-17 LAB
ALBUMIN SERPL-MCNC: 4.2 G/DL (ref 3.7–4.7)
ALBUMIN/GLOB SERPL: 2.1 {RATIO} (ref 1.2–2.2)
ALP SERPL-CCNC: 65 IU/L (ref 44–121)
ALT SERPL-CCNC: 12 IU/L (ref 0–32)
AST SERPL-CCNC: 18 IU/L (ref 0–40)
BASOPHILS # BLD AUTO: 0 X10E3/UL (ref 0–0.2)
BASOPHILS NFR BLD AUTO: 1 %
BILIRUB SERPL-MCNC: 0.4 MG/DL (ref 0–1.2)
BUN SERPL-MCNC: 11 MG/DL (ref 8–27)
BUN/CREAT SERPL: 15 (ref 12–28)
CALCIUM SERPL-MCNC: 9.2 MG/DL (ref 8.7–10.3)
CHLORIDE SERPL-SCNC: 105 MMOL/L (ref 96–106)
CO2 SERPL-SCNC: 23 MMOL/L (ref 20–29)
CREAT SERPL-MCNC: 0.74 MG/DL (ref 0.57–1)
EGFR: 84 ML/MIN/1.73
EOSINOPHIL # BLD AUTO: 0.1 X10E3/UL (ref 0–0.4)
EOSINOPHIL NFR BLD AUTO: 2 %
ERYTHROCYTE [DISTWIDTH] IN BLOOD BY AUTOMATED COUNT: 13 % (ref 11.7–15.4)
GLOBULIN SER CALC-MCNC: 2 G/DL (ref 1.5–4.5)
GLUCOSE SERPL-MCNC: 90 MG/DL (ref 65–99)
HCT VFR BLD AUTO: 41.9 % (ref 34–46.6)
HGB BLD-MCNC: 13.9 G/DL (ref 11.1–15.9)
IMM GRANULOCYTES # BLD AUTO: 0 X10E3/UL (ref 0–0.1)
IMM GRANULOCYTES NFR BLD AUTO: 0 %
LYMPHOCYTES # BLD AUTO: 1.5 X10E3/UL (ref 0.7–3.1)
LYMPHOCYTES NFR BLD AUTO: 40 %
MCH RBC QN AUTO: 29.9 PG (ref 26.6–33)
MCHC RBC AUTO-ENTMCNC: 33.2 G/DL (ref 31.5–35.7)
MCV RBC AUTO: 90 FL (ref 79–97)
MONOCYTES # BLD AUTO: 0.4 X10E3/UL (ref 0.1–0.9)
MONOCYTES NFR BLD AUTO: 10 %
NEUTROPHILS # BLD AUTO: 1.7 X10E3/UL (ref 1.4–7)
NEUTROPHILS NFR BLD AUTO: 47 %
PLATELET # BLD AUTO: 214 X10E3/UL (ref 150–450)
POTASSIUM SERPL-SCNC: 4.7 MMOL/L (ref 3.5–5.2)
PROT SERPL-MCNC: 6.2 G/DL (ref 6–8.5)
RBC # BLD AUTO: 4.65 X10E6/UL (ref 3.77–5.28)
SODIUM SERPL-SCNC: 140 MMOL/L (ref 134–144)
T4 FREE SERPL-MCNC: 1.37 NG/DL (ref 0.82–1.77)
TSH SERPL DL<=0.005 MIU/L-ACNC: 0.56 UIU/ML (ref 0.45–4.5)
WBC # BLD AUTO: 3.7 X10E3/UL (ref 3.4–10.8)

## 2022-08-03 DIAGNOSIS — L85.3 XEROSIS OF SKIN: ICD-10-CM

## 2022-08-04 RX ORDER — AMMONIUM LACTATE 12 G/100G
CREAM TOPICAL
Qty: 280 G | Refills: 0 | Status: SHIPPED | OUTPATIENT
Start: 2022-08-04

## 2022-09-13 ENCOUNTER — TELEPHONE (OUTPATIENT)
Dept: INTERNAL MEDICINE CLINIC | Age: 77
End: 2022-09-13

## 2023-01-13 ENCOUNTER — OFFICE VISIT (OUTPATIENT)
Dept: INTERNAL MEDICINE CLINIC | Age: 78
End: 2023-01-13
Payer: MEDICARE

## 2023-01-13 VITALS
WEIGHT: 147 LBS | DIASTOLIC BLOOD PRESSURE: 83 MMHG | HEIGHT: 63 IN | RESPIRATION RATE: 16 BRPM | OXYGEN SATURATION: 98 % | BODY MASS INDEX: 26.05 KG/M2 | SYSTOLIC BLOOD PRESSURE: 138 MMHG | TEMPERATURE: 97.5 F | HEART RATE: 72 BPM

## 2023-01-13 DIAGNOSIS — R06.09 DYSPNEA ON EXERTION: ICD-10-CM

## 2023-01-13 DIAGNOSIS — J06.9 VIRAL URI: ICD-10-CM

## 2023-01-13 DIAGNOSIS — Z79.899 ENCOUNTER FOR LONG-TERM (CURRENT) USE OF MEDICATIONS: ICD-10-CM

## 2023-01-13 DIAGNOSIS — F32.A DEPRESSION, UNSPECIFIED DEPRESSION TYPE: ICD-10-CM

## 2023-01-13 DIAGNOSIS — E03.9 HYPOTHYROIDISM, ADULT: Primary | ICD-10-CM

## 2023-01-13 PROCEDURE — G0463 HOSPITAL OUTPT CLINIC VISIT: HCPCS | Performed by: INTERNAL MEDICINE

## 2023-01-13 NOTE — PROGRESS NOTES
Verified name and birth date for privacy precautions. Chart reviewed in preparation for today's visit. Chief Complaint   Patient presents with    Thyroid Problem          Health Maintenance Due   Topic    COVID-19 Vaccine (4 - Booster for Moderna series)    Flu Vaccine (1)         Wt Readings from Last 3 Encounters:   01/13/23 147 lb (66.7 kg)   05/16/22 147 lb (66.7 kg)   11/16/21 145 lb 3.2 oz (65.9 kg)     Temp Readings from Last 3 Encounters:   01/13/23 97.5 °F (36.4 °C) (Temporal)   05/16/22 97.3 °F (36.3 °C) (Temporal)   11/16/21 97.1 °F (36.2 °C) (Temporal)     BP Readings from Last 3 Encounters:   01/13/23 138/83   05/16/22 117/78   11/16/21 125/80     Pulse Readings from Last 3 Encounters:   01/13/23 72   05/16/22 91   11/16/21 60         Learning Assessment:  :     Learning Assessment 9/13/2019 5/15/2017 6/22/2015 2/17/2014 4/18/2013   PRIMARY LEARNER Patient Patient Patient Patient Patient   HIGHEST LEVEL OF EDUCATION - PRIMARY LEARNER  > 4 YEARS OF COLLEGE GRADUATED HIGH SCHOOL OR GED > 4 YEARS OF COLLEGE > 4 YEARS OF COLLEGE -   BARRIERS PRIMARY LEARNER NONE - NONE NONE -   CO-LEARNER CAREGIVER No - No No -   PRIMARY LANGUAGE ENGLISH ENGLISH ENGLISH ENGLISH ENGLISH    NEED - - No No -   LEARNER PREFERENCE PRIMARY DEMONSTRATION DEMONSTRATION DEMONSTRATION LISTENING LISTENING     - - - DEMONSTRATION PICTURES   LEARNING SPECIAL TOPICS - - no no -   ANSWERED BY patient  Patient  patient patient patient   RELATIONSHIP SELF SELF SELF SELF SELF   ASSESSMENT COMMENT - - none none -       Depression Screening:  :     3 most recent PHQ Screens 1/13/2023   PHQ Not Done -   Little interest or pleasure in doing things Not at all   Feeling down, depressed, irritable, or hopeless Not at all   Total Score PHQ 2 0       Fall Risk Assessment:  :     Fall Risk Assessment, last 12 mths 1/13/2023   Able to walk? Yes   Fall in past 12 months? 0   Do you feel unsteady?  0   Are you worried about falling 0 Abuse Screening:  :     Abuse Screening Questionnaire 1/13/2023 5/16/2022 3/3/2020 9/13/2019 5/15/2017 6/22/2015 4/1/2014   Do you ever feel afraid of your partner? N N N N N N N   Are you in a relationship with someone who physically or mentally threatens you? N N N N N N N   Is it safe for you to go home?  [facility]

## 2023-01-13 NOTE — PROGRESS NOTES
Assessment/Plan:     1. Hypothyroidism, adult  -appears to be clinically euthyroid.   -taking levothyroxine 50mg daily but taking 2 tabs twice weekly. - CBC WITH AUTOMATED DIFF  - METABOLIC PANEL, COMPREHENSIVE  - LIPID PANEL  - TSH 3RD GENERATION  - T4, FREE    2. Depression, unspecified depression type  -stable and controlled with lexapro 10mg daily.     - CBC WITH AUTOMATED DIFF  - METABOLIC PANEL, COMPREHENSIVE    3. Encounter for long-term (current) use of medications      4. Dyspnea on exertion  -persistent  -history of social smoking  -has had normal stress tests. -will get pulmonary function testing.     - CBC WITH AUTOMATED DIFF  - METABOLIC PANEL, COMPREHENSIVE  - PULMONARY FUNCTION TEST; Future    5. Viral URI  -symptoms improving. Symptoms started about 6 days ago.   -tested negative for COVID in office.     - AMB POC COVID-19 COV       Need to go to [facility] due to  secondary insurance. Orders Placed This Encounter    CBC WITH AUTOMATED DIFF    METABOLIC PANEL, COMPREHENSIVE    LIPID PANEL    TSH 3RD GENERATION    T4, FREE    AMB POC COVID-19 COV     Order Specific Question:   Is this test for diagnosis or screening? Answer:   Diagnosis of ill patient     Order Specific Question:   Symptomatic for COVID-19 as defined by CDC? Answer:   Yes     Order Specific Question:   Date of Symptom Onset     Answer:   1/7/2023     Order Specific Question:   Hospitalized for COVID-19? Answer:   No     Order Specific Question:   Admitted to ICU for COVID-19? Answer:   No     Order Specific Question:   Employed in healthcare setting? Answer:   No     Order Specific Question:   Resident in a congregate (group) care setting? Answer:   No     Order Specific Question:   Pregnant? Answer:   No     Order Specific Question:   Previously tested for COVID-19?      Answer:   No    PULMONARY FUNCTION TEST     Standing Status:   Future     Standing Expiration Date:   7/13/2023      Follow-up Disposition:     Follow up in 6 months               Subjective:      Prem Ramirez is a 68 y.o. female who presents today for follow up of her depression and hypothyroid. Since last visit :  -last Saturday, scratchy throat and postnasal drip. Mucinex and allergy medications. Feeling some improvement today. No known contacts. Has been wearing her mask when out.      -very easily winded with exertion. Has had stress test normal. CT scan of heart with calcium score of 0. Does have history of social smoking. Quit about 45 years ago. Smoked mostly on weekends. Patient Care Team:  -Dr. Estephanie Solo.   -Dr. Igor Benitez - colorectal surgeon- colonoscopy        Objective: Wt Readings from Last 3 Encounters:   01/13/23 147 lb (66.7 kg)   05/16/22 147 lb (66.7 kg)   11/16/21 145 lb 3.2 oz (65.9 kg)     BP Readings from Last 3 Encounters:   01/13/23 138/83   05/16/22 117/78   11/16/21 125/80     Visit Vitals  /83   Pulse 72   Temp 97.5 °F (36.4 °C) (Temporal)   Resp 16   Ht 5' 3\" (1.6 m)   Wt 147 lb (66.7 kg)   SpO2 98%   BMI 26.04 kg/m²     General appearance: alert, cooperative, no distress, appears stated age  Head: Normocephalic, without obvious abnormality, atraumatic  Neck: supple, symmetrical, trachea midline, no adenopathy, thyroid: not enlarged, symmetric, no tenderness/mass/nodules, no carotid bruit, and no JVD  Lungs: clear to auscultation bilaterally  Heart: regular rate and rhythm, S1, S2 normal, no murmur, click, rub or gallop  Abdomen: soft, non-tender. Bowel sounds normal. No masses,  no organomegaly  Extremities: extremities normal, atraumatic, no cyanosis or edema              Disclaimer:  Return if symptoms worsen or fail to improve. Advised patient to call back or return to office if symptoms worsen/change/persist.     Discussed expected course/resolution/complications of diagnosis in detail with patient.    Medication risks/benefits/costs/interactions/alternatives discussed with patient. Patient was given an after visit summary which includes diagnoses, current medications, & vitals. Patient expressed understanding with the diagnosis and plan.

## 2023-01-14 LAB
ALBUMIN SERPL-MCNC: 4.7 G/DL (ref 3.7–4.7)
ALBUMIN/GLOB SERPL: 2.2 {RATIO} (ref 1.2–2.2)
ALP SERPL-CCNC: 68 IU/L (ref 44–121)
ALT SERPL-CCNC: 19 IU/L (ref 0–32)
AST SERPL-CCNC: 26 IU/L (ref 0–40)
BASOPHILS # BLD AUTO: 0 X10E3/UL (ref 0–0.2)
BASOPHILS NFR BLD AUTO: 1 %
BILIRUB SERPL-MCNC: 0.4 MG/DL (ref 0–1.2)
BUN SERPL-MCNC: 15 MG/DL (ref 8–27)
BUN/CREAT SERPL: 19 (ref 12–28)
CALCIUM SERPL-MCNC: 9.8 MG/DL (ref 8.7–10.3)
CHLORIDE SERPL-SCNC: 102 MMOL/L (ref 96–106)
CHOLEST SERPL-MCNC: 250 MG/DL (ref 100–199)
CO2 SERPL-SCNC: 22 MMOL/L (ref 20–29)
CREAT SERPL-MCNC: 0.81 MG/DL (ref 0.57–1)
EGFR: 75 ML/MIN/1.73
EOSINOPHIL # BLD AUTO: 0.1 X10E3/UL (ref 0–0.4)
EOSINOPHIL NFR BLD AUTO: 2 %
ERYTHROCYTE [DISTWIDTH] IN BLOOD BY AUTOMATED COUNT: 13.2 % (ref 11.7–15.4)
GLOBULIN SER CALC-MCNC: 2.1 G/DL (ref 1.5–4.5)
GLUCOSE SERPL-MCNC: 93 MG/DL (ref 70–99)
HCT VFR BLD AUTO: 46.2 % (ref 34–46.6)
HDLC SERPL-MCNC: 49 MG/DL
HGB BLD-MCNC: 15 G/DL (ref 11.1–15.9)
IMM GRANULOCYTES # BLD AUTO: 0 X10E3/UL (ref 0–0.1)
IMM GRANULOCYTES NFR BLD AUTO: 0 %
LDLC SERPL CALC-MCNC: 178 MG/DL (ref 0–99)
LYMPHOCYTES # BLD AUTO: 1.9 X10E3/UL (ref 0.7–3.1)
LYMPHOCYTES NFR BLD AUTO: 40 %
MCH RBC QN AUTO: 29.6 PG (ref 26.6–33)
MCHC RBC AUTO-ENTMCNC: 32.5 G/DL (ref 31.5–35.7)
MCV RBC AUTO: 91 FL (ref 79–97)
MONOCYTES # BLD AUTO: 0.4 X10E3/UL (ref 0.1–0.9)
MONOCYTES NFR BLD AUTO: 8 %
NEUTROPHILS # BLD AUTO: 2.4 X10E3/UL (ref 1.4–7)
NEUTROPHILS NFR BLD AUTO: 49 %
PLATELET # BLD AUTO: 242 X10E3/UL (ref 150–450)
POTASSIUM SERPL-SCNC: 4.5 MMOL/L (ref 3.5–5.2)
PROT SERPL-MCNC: 6.8 G/DL (ref 6–8.5)
RBC # BLD AUTO: 5.07 X10E6/UL (ref 3.77–5.28)
SODIUM SERPL-SCNC: 138 MMOL/L (ref 134–144)
T4 FREE SERPL-MCNC: 1.09 NG/DL (ref 0.82–1.77)
TRIGL SERPL-MCNC: 126 MG/DL (ref 0–149)
TSH SERPL DL<=0.005 MIU/L-ACNC: 2.76 UIU/ML (ref 0.45–4.5)
VLDLC SERPL CALC-MCNC: 23 MG/DL (ref 5–40)
WBC # BLD AUTO: 4.9 X10E3/UL (ref 3.4–10.8)

## 2023-01-24 ENCOUNTER — HOSPITAL ENCOUNTER (OUTPATIENT)
Dept: PULMONOLOGY | Age: 78
Discharge: HOME | End: 2023-01-24
Attending: INTERNAL MEDICINE
Payer: MEDICARE

## 2023-01-24 DIAGNOSIS — R06.09 DYSPNEA ON EXERTION: ICD-10-CM

## 2023-01-24 PROCEDURE — 94010 BREATHING CAPACITY TEST: CPT

## 2023-03-30 RX ORDER — LEVOTHYROXINE SODIUM 50 UG/1
TABLET ORAL
Qty: 120 TABLET | Refills: 1 | Status: CANCELLED | OUTPATIENT
Start: 2023-03-30

## 2023-04-17 ENCOUNTER — OFFICE VISIT (OUTPATIENT)
Dept: INTERNAL MEDICINE CLINIC | Age: 78
End: 2023-04-17
Payer: MEDICARE

## 2023-04-17 VITALS
DIASTOLIC BLOOD PRESSURE: 74 MMHG | TEMPERATURE: 97.8 F | HEART RATE: 73 BPM | BODY MASS INDEX: 25.52 KG/M2 | WEIGHT: 144 LBS | RESPIRATION RATE: 16 BRPM | HEIGHT: 63 IN | SYSTOLIC BLOOD PRESSURE: 127 MMHG | OXYGEN SATURATION: 95 %

## 2023-04-17 DIAGNOSIS — F32.A DEPRESSION, UNSPECIFIED DEPRESSION TYPE: ICD-10-CM

## 2023-04-17 DIAGNOSIS — Z78.0 MENOPAUSE: ICD-10-CM

## 2023-04-17 DIAGNOSIS — Z13.31 SCREENING FOR DEPRESSION: ICD-10-CM

## 2023-04-17 DIAGNOSIS — R06.09 DYSPNEA ON EXERTION: ICD-10-CM

## 2023-04-17 DIAGNOSIS — Z00.00 MEDICARE ANNUAL WELLNESS VISIT, SUBSEQUENT: Primary | ICD-10-CM

## 2023-04-17 DIAGNOSIS — E78.2 MIXED HYPERLIPIDEMIA: ICD-10-CM

## 2023-04-17 DIAGNOSIS — E03.9 HYPOTHYROIDISM, ADULT: ICD-10-CM

## 2023-04-17 DIAGNOSIS — Z79.899 ENCOUNTER FOR LONG-TERM (CURRENT) USE OF MEDICATIONS: ICD-10-CM

## 2023-04-17 DIAGNOSIS — R26.9 GAIT DISTURBANCE: ICD-10-CM

## 2023-04-17 PROCEDURE — G0463 HOSPITAL OUTPT CLINIC VISIT: HCPCS | Performed by: INTERNAL MEDICINE

## 2023-04-17 RX ORDER — LEVOTHYROXINE SODIUM 50 UG/1
TABLET ORAL
Qty: 120 TABLET | Refills: 1 | Status: SHIPPED | OUTPATIENT
Start: 2023-04-17

## 2023-04-17 NOTE — PROGRESS NOTES
Verified name and birth date for privacy precautions. Chart reviewed in preparation for today's visit. Chief Complaint   Patient presents with    Annual Wellness Visit          Health Maintenance Due   Topic    Medicare Yearly Exam          Wt Readings from Last 3 Encounters:   04/17/23 144 lb (65.3 kg)   01/13/23 147 lb (66.7 kg)   05/16/22 147 lb (66.7 kg)     Temp Readings from Last 3 Encounters:   04/17/23 97.8 °F (36.6 °C) (Temporal)   01/13/23 97.5 °F (36.4 °C) (Temporal)   05/16/22 97.3 °F (36.3 °C) (Temporal)     BP Readings from Last 3 Encounters:   04/17/23 127/74   01/13/23 138/83   05/16/22 117/78     Pulse Readings from Last 3 Encounters:   04/17/23 73   01/13/23 72   05/16/22 91         Learning Assessment:  :     Learning Assessment 9/13/2019 5/15/2017 6/22/2015 2/17/2014 4/18/2013   PRIMARY LEARNER Patient Patient Patient Patient Patient   HIGHEST LEVEL OF EDUCATION - PRIMARY LEARNER  > 4 YEARS OF COLLEGE GRADUATED HIGH SCHOOL OR GED > 4 YEARS OF COLLEGE > 4 YEARS OF COLLEGE -   BARRIERS PRIMARY LEARNER NONE - NONE NONE -   CO-LEARNER CAREGIVER No - No No -   PRIMARY LANGUAGE ENGLISH ENGLISH ENGLISH ENGLISH ENGLISH    NEED - - No No -   LEARNER PREFERENCE PRIMARY DEMONSTRATION DEMONSTRATION DEMONSTRATION LISTENING LISTENING     - - - DEMONSTRATION PICTURES   LEARNING SPECIAL TOPICS - - no no -   ANSWERED BY patient  Patient  patient patient patient   RELATIONSHIP SELF SELF SELF SELF SELF   ASSESSMENT COMMENT - - none none -       Depression Screening:  :     3 most recent PHQ Screens 4/17/2023   PHQ Not Done -   Little interest or pleasure in doing things Not at all   Feeling down, depressed, irritable, or hopeless Not at all   Total Score PHQ 2 0       Fall Risk Assessment:  :     Fall Risk Assessment, last 12 mths 4/17/2023   Able to walk? Yes   Fall in past 12 months? 0   Do you feel unsteady?  0   Are you worried about falling 0       Abuse Screening:  :     Abuse Screening Questionnaire 4/17/2023 1/13/2023 5/16/2022 3/3/2020 9/13/2019 5/15/2017 6/22/2015   Do you ever feel afraid of your partner? N N N N N N N   Are you in a relationship with someone who physically or mentally threatens you? N N N N N N N   Is it safe for you to go home?  Nelsy Skinner

## 2023-04-17 NOTE — PROGRESS NOTES
Assessment/Plan:   Need [facility] due to secondary insurance    1. Hypothyroidism, adult  -appears clinically euthyroid    - CBC WITH AUTOMATED DIFF  - METABOLIC PANEL, COMPREHENSIVE  - TSH 3RD GENERATION  - T4, FREE    2. Depression, unspecified depression type  -mood is very stable with use of lexapro 10mg daily.      - CBC WITH AUTOMATED DIFF  - METABOLIC PANEL, COMPREHENSIVE    3. Menopause  -recommend DXA. 4. Encounter for long-term (current) use of medications      5. Medicare annual wellness visit, subsequent  -completed today  -[facility] decision maker reviewed with patient and updated. - DEPRESSION SCREEN ANNUAL    6. Screening for depression  -has history of depression with some anxiety. -currently taking lexapro 10mg daily. -mood stable with use of medication.   -time spent in evaluation 8 minutes. - DEPRESSION SCREEN ANNUAL    7. Dyspnea on exertion  -with hyperlipidemia and persistent symptoms. Will repeat stress test  -had normal PFT last year  -calcium score of 0 noted 3/2022    - EXERCISE CARDIAC STRESS TEST     8. hyperlipidemia   -had heart CT with calcium score of 0 done in 3/2022  -maintain low fat diet. Orders Placed This Encounter    ANNUAL DEPRESSION SCREEN 8-15 MIN    CBC WITH AUTOMATED DIFF    METABOLIC PANEL, COMPREHENSIVE    TSH 3RD GENERATION    T4, FREE    levothyroxine (SYNTHROID) 50 mcg tablet     Sig: TAKE ONE TABLET BY MOUTH DAILY BEFORE BREAKFAST WITH THE EXCEPTION OF 2 DAYS PER WEEK SHE TAKES 2 TABLETS. Dispense:  120 Tablet     Refill:  1      Follow-up Disposition:     Follow up in 6 months                 Subjective:      Hailey Bell is a 68 y.o. female who presents today for her Medicare Wellness Exam and follow up of her hypothyroid and depression. Since last visit :  -mammogram - 5/6/2022  -colonoscopy - Dr. Rosales Fear- due 2026  -DXA - 2017.  Osteoporosis in left forearm    -hyperlipidemia - with calcium score of 0 done 3/2022.     -itchy ears    -I walk like a 'duck'    -back and hip pain - walks in pool 2-3 times per week for exercise. Has taken an occasional advil or excedrin for back pain. -still having dyspnea with exertion with walking on land, even flat surfaces, for very short distances. Improves with rest. Last stress test done in 2017    Patient Care Team:  -Dr. Brody Isaac.   -Dr. Kosta Fong - colorectal surgeon- colonoscopy          Objective: Wt Readings from Last 3 Encounters:   04/17/23 144 lb (65.3 kg)   01/13/23 147 lb (66.7 kg)   05/16/22 147 lb (66.7 kg)     BP Readings from Last 3 Encounters:   04/17/23 127/74   01/13/23 138/83   05/16/22 117/78     Visit Vitals  /74   Pulse 73   Temp 97.8 °F (36.6 °C) (Temporal)   Resp 16   Ht 5' 3\" (1.6 m)   Wt 144 lb (65.3 kg)   SpO2 95%   BMI 25.51 kg/m²     General appearance: alert, cooperative, no distress, appears stated age  Head: Normocephalic, without obvious abnormality, atraumatic  Neck: supple, symmetrical, trachea midline, no adenopathy, no carotid bruit, and no JVD  Lungs: clear to auscultation bilaterally  Heart: regular rate and rhythm, S1, S2 normal, no murmur, click, rub or gallop  Abdomen: soft, non-tender. Bowel sounds normal. No masses,  no organomegaly  Extremities: extremities normal, atraumatic, no cyanosis or edema  Pulses: 2+ and symmetric  Gait: both feet turn out with gait. Disclaimer:  Return if symptoms worsen or fail to improve. Advised patient to call back or return to office if symptoms worsen/change/persist.     Discussed expected course/resolution/complications of diagnosis in detail with patient. Medication risks/benefits/costs/interactions/alternatives discussed with patient. Patient was given an after visit summary which includes diagnoses, current medications, & vitals. Patient expressed understanding with the diagnosis and plan. This is the Subsequent Medicare Annual Wellness Exam, performed 12 months or more after the Initial AWV or the last Subsequent AWV    I have reviewed the patient's medical history in detail and updated the computerized patient record. Assessment/Plan   Education and counseling provided:  Are appropriate based on today's review and evaluation    1. Medicare annual wellness visit, subsequent  -     [place]  2. Hypothyroidism, adult  3. Depression, unspecified depression type  4. Menopause  5. Encounter for long-term (current) use of medications  6. Screening for depression  -     DEPRESSION SCREEN ANNUAL       Depression Risk Factor Screening     3 most recent PHQ Screens 4/17/2023   PHQ Not Done -   Little interest or pleasure in doing things Not at all   Feeling down, depressed, irritable, or hopeless Not at all   Total Score PHQ 2 0       Alcohol & Drug Abuse Risk Screen    Do you average more than 1 drink per night or more than 7 drinks a week:  No    On any one occasion in the past three months have you have had more than 3 drinks containing alcohol:  No          Functional Ability and Level of Safety    Hearing: Hearing is good. Activities of Daily Living: The home contains: no safety equipment. Patient does total self care      Ambulation: with no difficulty     Fall Risk:  Fall Risk Assessment, last 12 mths 4/17/2023   Able to walk? Yes   Fall in past 12 months? 0   Do you feel unsteady? 0   Are you worried about falling 0      Abuse Screen:  Patient is not abused       Cognitive Screening    Has your family/caregiver stated any concerns about your memory: no         Health Maintenance Due   There are no preventive care reminders to display for this patient.       Patient Care Team   Patient Care Team:  Zach Villarreal MD as PCP - General (Internal Medicine Physician)  Zach Villarreal MD as PCP - REHABILITATION [facility] Empaneled Provider  Joaquim Lewis MD (Otolaryngology)  Buzz Mendez MD (Otolaryngology)  Thor Mohs, MD (Dermatology Physician)  Raudel Talamantes MD (Ophthalmology)    History     Patient Active Problem List   Diagnosis Code    Acquired hypothyroidism E03.9    Hypercholesteremia E78.00    History of colonoscopy Z98.890    Environmental allergies Z91.09    Xerosis of skin L85.3    Vitamin D deficiency E55.9    Osteopenia M85.80    Adjustment disorder F43.20    Migraine headache G43.909    Family history of breast cancer in first degree relative Z80.3    H/O screening mammography Z92.89    HALEY (obstructive sleep apnea) G47.33     Past Medical History:   Diagnosis Date    Adverse effect of anesthesia     SLOW WAKING PAST ANESTHESIA, SEVERE HEADACHE    Depression     Headache(784.0)     migraines    Hypercholesterolemia     Hypothyroid     Nephrolithiasis     Scoliosis       Past Surgical History:   Procedure Laterality Date    ENDOSCOPY, COLON, DIAGNOSTIC  2009    neg. ([place])-f/u 5 yrs. HX CATARACT REMOVAL Right 06/2019    HX DILATION AND CURETTAGE  1986    benign    HX GI      COLONOSCOPY    HX ORTHOPAEDIC Right 01/17/2019    ACL replacement     HX TONSILLECTOMY  childhood    T&A     Current Outpatient Medications   Medication Sig Dispense Refill    escitalopram oxalate (LEXAPRO) 10 mg tablet TAKE ONE TABLET BY MOUTH EVERY DAY 90 Tablet 1    levothyroxine (SYNTHROID) 50 mcg tablet TAKE ONE TABLET BY MOUTH DAILY BEFORE BREAKFAST WITH THE EXCEPTION OF 2 DAYS PER WEEK SHE TAKES 2 TABLETS. 120 Tablet 1    loratadine (CLARITIN) 10 mg tablet Take 1 Tab by mouth daily.  (Patient not taking: Reported on 4/17/2023) 90 Tab 2     Allergies   Allergen Reactions    Talwin [Pentazocine Lactate] Unknown (comments)     Altered sensorium    Amoxicillin Other (comments)     Diarrhea, stomach upset    Darvon [Propoxyphene] Unknown (comments)    Diamox Other (comments)     migraine    Morphine Other (comments)     Pt states she is very sensitive to all narcotics, feels like she will pass out    Simvastatin Other (comments)     Headache    Zithromax [Azithromycin] Other (comments) depression       Family History   Problem Relation Age of Onset    Hypertension Mother     Heart Disease Mother 61        CHF/etoh    Arrhythmia Mother     Heart Failure Mother     Diabetes Father     Coronary Art Dis Father 61        CABG (twice)    Heart Disease Father     Heart Surgery Father     Breast Cancer Sister 61    Cancer Sister         BREAST    Heart Disease Brother     Anesth Problems Neg Hx      Social History     Tobacco Use    Smoking status: Former     Packs/day: 0.50     Years: 10.00     Pack years: 5.00     Types: Cigarettes     Quit date: 1970     Years since quittin.2    Smokeless tobacco: Never   Substance Use Topics    Alcohol use:  Yes     Alcohol/week: 1.0 - 2.0 standard drink     Types: 1 Shots of liquor per week     Comment: OCCASIONALLY         Paulino Black MD

## 2023-04-17 NOTE — PATIENT INSTRUCTIONS
Medicare Wellness Visit, Female     The best way to live healthy is to have a lifestyle where you eat a well-balanced diet, exercise regularly, limit alcohol use, and quit all forms of tobacco/nicotine, if applicable. Regular preventive services are another way to keep healthy. Preventive services (vaccines, screening tests, monitoring & exams) can help personalize your care plan, which helps you manage your own care. Screening tests can find health problems at the earliest stages, when they are easiest to treat. [facility] follows the current, evidence-based guidelines published by the [facility] ([facility]) when recommending preventive services for our patients. Because we follow these guidelines, sometimes recommendations change over time as research supports it. (For example, mammograms used to be recommended annually. Even though Medicare will still pay for an annual mammogram, the newer guidelines recommend a mammogram every two years for women of average risk). Of course, you and your doctor may decide to screen more often for some diseases, based on your risk and your co-morbidities (chronic disease you are already diagnosed with). Preventive services for you include:  - Medicare offers their members a free annual wellness visit, which is time for you and your primary care provider to discuss and plan for your preventive service needs.  Take advantage of this benefit every year!    -Over the age of 72 should receive the recommended pneumonia vaccines.    -All adults should have a flu vaccine yearly.  -All adults should have a tetanus vaccine every 10 years.   -Over the age 48 should receive the shingles vaccines.        -All adults should be screened once for Hepatitis C.  -All adults age 38-68 who are overweight should have a diabetes screening test once every three years.   -Other screening tests and preventive services for persons with diabetes include: an eye exam to screen for diabetic retinopathy, a kidney function test, a foot exam, and stricter control over your cholesterol.   -Cardiovascular screening for adults with routine risk involves an electrocardiogram (ECG) at intervals determined by your doctor.     -Colorectal cancer screenings should be done for adults age 39-70 with no increased risk factors for colorectal cancer. There are a number of acceptable methods of screening for this type of cancer. Each test has its own benefits and drawbacks. Discuss with your doctor what is most appropriate for you during your annual wellness visit. The different tests include: colonoscopy (considered the best screening method), a fecal occult blood test, a fecal DNA test, and sigmoidoscopy.    -Lung cancer screening is recommended annually with a low dose CT scan for adults between age 54 and 68, who have smoked at least 30 pack years (equivalent of 1 pack per day for 30 days), and who is a current smoker or quit less than 15 years ago.    -A bone mass density test is recommended when a woman turns 65 to screen for osteoporosis. This test is only recommended one time, as a screening. Some providers will use this same test as a disease monitoring tool if you already have osteoporosis. -Breast cancer screenings are recommended every other year for women of normal risk, age 54-69.    -Cervical cancer screenings for women over age 72 are only recommended with certain risk factors. Here is a list of your current Health Maintenance items (your personalized list of preventive services) with a due date: There are no preventive care reminders to display for this patient.

## 2023-04-17 NOTE — PROGRESS NOTES
Left detailed message for pt that Dr Jose Manriquez said she is due for a bone density study to screen for osteoporosis. Her last was done in 2017 and she had osteoporosis in her forearm. If she hasnt heard from [facility] scheduling in next few days she can call them at [phone].

## 2023-04-18 LAB
ALBUMIN SERPL-MCNC: 4.7 G/DL (ref 3.7–4.7)
ALBUMIN/GLOB SERPL: 2.5 {RATIO} (ref 1.2–2.2)
ALP SERPL-CCNC: 73 IU/L (ref 44–121)
ALT SERPL-CCNC: 14 IU/L (ref 0–32)
AST SERPL-CCNC: 26 IU/L (ref 0–40)
BASOPHILS # BLD AUTO: 0.1 X10E3/UL (ref 0–0.2)
BASOPHILS NFR BLD AUTO: 1 %
BILIRUB SERPL-MCNC: 0.3 MG/DL (ref 0–1.2)
BUN SERPL-MCNC: 10 MG/DL (ref 8–27)
BUN/CREAT SERPL: 13 (ref 12–28)
CALCIUM SERPL-MCNC: 9.7 MG/DL (ref 8.7–10.3)
CHLORIDE SERPL-SCNC: 103 MMOL/L (ref 96–106)
CO2 SERPL-SCNC: 23 MMOL/L (ref 20–29)
CREAT SERPL-MCNC: 0.77 MG/DL (ref 0.57–1)
EGFRCR SERPLBLD CKD-EPI 2021: 79 ML/MIN/1.73
EOSINOPHIL # BLD AUTO: 0.1 X10E3/UL (ref 0–0.4)
EOSINOPHIL NFR BLD AUTO: 2 %
ERYTHROCYTE [DISTWIDTH] IN BLOOD BY AUTOMATED COUNT: 13.8 % (ref 11.7–15.4)
GLOBULIN SER CALC-MCNC: 1.9 G/DL (ref 1.5–4.5)
GLUCOSE SERPL-MCNC: 81 MG/DL (ref 70–99)
HCT VFR BLD AUTO: 43 % (ref 34–46.6)
HGB BLD-MCNC: 14.3 G/DL (ref 11.1–15.9)
IMM GRANULOCYTES # BLD AUTO: 0 X10E3/UL (ref 0–0.1)
IMM GRANULOCYTES NFR BLD AUTO: 0 %
LYMPHOCYTES # BLD AUTO: 1.9 X10E3/UL (ref 0.7–3.1)
LYMPHOCYTES NFR BLD AUTO: 34 %
MCH RBC QN AUTO: 30 PG (ref 26.6–33)
MCHC RBC AUTO-ENTMCNC: 33.3 G/DL (ref 31.5–35.7)
MCV RBC AUTO: 90 FL (ref 79–97)
MONOCYTES # BLD AUTO: 0.4 X10E3/UL (ref 0.1–0.9)
MONOCYTES NFR BLD AUTO: 8 %
NEUTROPHILS # BLD AUTO: 3 X10E3/UL (ref 1.4–7)
NEUTROPHILS NFR BLD AUTO: 55 %
PLATELET # BLD AUTO: 251 X10E3/UL (ref 150–450)
POTASSIUM SERPL-SCNC: 4.8 MMOL/L (ref 3.5–5.2)
PROT SERPL-MCNC: 6.6 G/DL (ref 6–8.5)
RBC # BLD AUTO: 4.77 X10E6/UL (ref 3.77–5.28)
SODIUM SERPL-SCNC: 141 MMOL/L (ref 134–144)
T4 FREE SERPL-MCNC: 1.29 NG/DL (ref 0.82–1.77)
TSH SERPL DL<=0.005 MIU/L-ACNC: 2.11 UIU/ML (ref 0.45–4.5)
WBC # BLD AUTO: 5.5 X10E3/UL (ref 3.4–10.8)

## 2023-04-26 ENCOUNTER — TRANSCRIBE ORDERS (OUTPATIENT)
Facility: HOSPITAL | Age: 78
End: 2023-04-26

## 2023-04-26 DIAGNOSIS — Z78.0 MENOPAUSE: Primary | ICD-10-CM

## 2023-04-26 DIAGNOSIS — R06.09 DYSPNEA ON EXERTION: Primary | ICD-10-CM

## 2023-05-09 ENCOUNTER — HOSPITAL ENCOUNTER (OUTPATIENT)
Facility: HOSPITAL | Age: 78
Discharge: HOME | End: 2023-05-12
Payer: MEDICARE

## 2023-05-09 ENCOUNTER — HOSPITAL ENCOUNTER (OUTPATIENT)
Facility: HOSPITAL | Age: 78
Discharge: HOME | End: 2023-05-11
Payer: MEDICARE

## 2023-05-09 VITALS
HEIGHT: 63 IN | HEART RATE: 81 BPM | SYSTOLIC BLOOD PRESSURE: 140 MMHG | DIASTOLIC BLOOD PRESSURE: 70 MMHG | WEIGHT: 142 LBS | BODY MASS INDEX: 25.16 KG/M2

## 2023-05-09 DIAGNOSIS — Z78.0 MENOPAUSE: ICD-10-CM

## 2023-05-09 DIAGNOSIS — R06.09 DYSPNEA ON EXERTION: ICD-10-CM

## 2023-05-09 LAB
ECHO BSA: 1.69 M2
STRESS ANGINA INDEX: 0
STRESS BASELINE ST DEPRESSION: 0 MM
STRESS ST DEPRESSION: 0 MM
STRESS TARGET HR: 143 BPM

## 2023-05-09 PROCEDURE — 93016 CV STRESS TEST SUPVJ ONLY: CPT | Performed by: INTERNAL MEDICINE

## 2023-05-09 PROCEDURE — 93018 CV STRESS TEST I&R ONLY: CPT | Performed by: INTERNAL MEDICINE

## 2023-05-09 PROCEDURE — 77080 DXA BONE DENSITY AXIAL: CPT

## 2023-05-09 PROCEDURE — 93017 CV STRESS TEST TRACING ONLY: CPT

## 2023-05-19 ENCOUNTER — TELEPHONE (OUTPATIENT)
Age: 78
End: 2023-05-19

## 2023-05-19 DIAGNOSIS — Z92.89 H/O BONE DENSITY STUDY: Chronic | ICD-10-CM

## 2023-05-19 DIAGNOSIS — M81.6 LOCALIZED OSTEOPOROSIS WITHOUT CURRENT PATHOLOGICAL FRACTURE: ICD-10-CM

## 2023-05-19 RX ORDER — ALENDRONATE SODIUM 70 MG/1
70 TABLET ORAL
Qty: 12 TABLET | Refills: 3 | Status: SHIPPED | OUTPATIENT
Start: 2023-05-19

## 2023-05-19 NOTE — TELEPHONE ENCOUNTER
5/19/2023 spoke with patient.     -discussed results of her bone density study done at [facility] on 5/10/2023. Left forearm has progressed to osteoporosis. Discussed use of bisphosphonates. Will start fosamax 70mg weekly. Orders Placed This Encounter    alendronate (FOSAMAX) 70 MG tablet     Sig: Take 1 tablet by mouth every 7 days Take with a full glass of water upon arising for the day. Consume on an empty stomach at least 30 minutes before the first food, beverage, or medication. Stay upright (do not lie down) for at least 30 minutes. Do not crush or break.      Dispense:  12 tablet     Refill:  3

## 2023-06-21 ENCOUNTER — TRANSCRIBE ORDERS (OUTPATIENT)
Facility: HOSPITAL | Age: 78
End: 2023-06-21

## 2023-06-21 DIAGNOSIS — Z12.31 VISIT FOR SCREENING MAMMOGRAM: Primary | ICD-10-CM

## 2023-08-25 ENCOUNTER — HOSPITAL ENCOUNTER (OUTPATIENT)
Facility: HOSPITAL | Age: 78
End: 2023-08-25
Attending: INTERNAL MEDICINE
Payer: MEDICARE

## 2023-08-25 VITALS — WEIGHT: 143 LBS | HEIGHT: 64 IN | BODY MASS INDEX: 24.41 KG/M2

## 2023-08-25 DIAGNOSIS — Z12.31 VISIT FOR SCREENING MAMMOGRAM: ICD-10-CM

## 2023-08-25 PROCEDURE — 77063 BREAST TOMOSYNTHESIS BI: CPT

## 2023-08-27 RX ORDER — ESCITALOPRAM OXALATE 10 MG/1
TABLET ORAL
Qty: 90 TABLET | Refills: 0 | Status: SHIPPED | OUTPATIENT
Start: 2023-08-27

## 2023-09-21 ENCOUNTER — TELEMEDICINE (OUTPATIENT)
Age: 78
End: 2023-09-21
Payer: MEDICARE

## 2023-09-21 ENCOUNTER — TELEPHONE (OUTPATIENT)
Age: 78
End: 2023-09-21

## 2023-09-21 DIAGNOSIS — E78.00 HYPERCHOLESTEREMIA: ICD-10-CM

## 2023-09-21 DIAGNOSIS — E03.9 ACQUIRED HYPOTHYROIDISM: ICD-10-CM

## 2023-09-21 DIAGNOSIS — L23.7 ALLERGIC CONTACT DERMATITIS DUE TO PLANTS, EXCEPT FOOD: Primary | ICD-10-CM

## 2023-09-21 DIAGNOSIS — E03.9 ACQUIRED HYPOTHYROIDISM: Primary | ICD-10-CM

## 2023-09-21 PROCEDURE — G8427 DOCREV CUR MEDS BY ELIG CLIN: HCPCS | Performed by: NURSE PRACTITIONER

## 2023-09-21 PROCEDURE — 99213 OFFICE O/P EST LOW 20 MIN: CPT | Performed by: NURSE PRACTITIONER

## 2023-09-21 PROCEDURE — 1090F PRES/ABSN URINE INCON ASSESS: CPT | Performed by: NURSE PRACTITIONER

## 2023-09-21 PROCEDURE — G8399 PT W/DXA RESULTS DOCUMENT: HCPCS | Performed by: NURSE PRACTITIONER

## 2023-09-21 PROCEDURE — 1123F ACP DISCUSS/DSCN MKR DOCD: CPT | Performed by: NURSE PRACTITIONER

## 2023-09-21 RX ORDER — PREDNISONE 10 MG/1
TABLET ORAL
Qty: 42 TABLET | Refills: 0 | Status: SHIPPED | OUTPATIENT
Start: 2023-09-21

## 2023-09-21 NOTE — PROGRESS NOTES
Reconciliation   levothyroxine (SYNTHROID) 50 MCG tablet TAKE ONE TABLET BY MOUTH DAILY BEFORE BREAKFAST WITH THE EXCEPTION OF 2 DAYS PER WEEK SHE TAKES 2 TABLETS. 10/10/22   Ar Automatic Reconciliation       Patient Active Problem List   Diagnosis    Acquired hypothyroidism    Family history of breast cancer in first degree relative    Migraine headache    Environmental allergies    Xerosis of skin    Vitamin D deficiency    H/O screening mammography    Adjustment disorder    Localized osteoporosis without current pathological fracture    LAITH (obstructive sleep apnea)    History of colonoscopy    Hypercholesteremia    H/O bone density study     Patient Active Problem List    Diagnosis Date Noted    H/O bone density study 05/19/2023    LAITH (obstructive sleep apnea) 03/03/2020    H/O screening mammography 09/26/2019    Family history of breast cancer in first degree relative 07/19/2016    Migraine headache 12/02/2015    Adjustment disorder 08/19/2014    Localized osteoporosis without current pathological fracture 08/19/2014    Environmental allergies 12/04/2013    Xerosis of skin 12/04/2013    Vitamin D deficiency 12/04/2013    History of colonoscopy 09/18/2013    Hypercholesteremia 09/18/2013    Acquired hypothyroidism 06/07/2012     Current Outpatient Medications   Medication Sig Dispense Refill    predniSONE (DELTASONE) 10 MG tablet Take 6 tabs x 2 days, 4 tabs x 3 days, 3 tabs x 3 days, 2 tabs x 3 days, 1 tab x 3 days 42 tablet 0    escitalopram (LEXAPRO) 10 MG tablet TAKE ONE TABLET BY MOUTH EVERY DAY 90 tablet 0    alendronate (FOSAMAX) 70 MG tablet Take 1 tablet by mouth every 7 days Take with a full glass of water upon arising for the day. Consume on an empty stomach at least 30 minutes before the first food, beverage, or medication. Stay upright (do not lie down) for at least 30 minutes. Do not crush or break.  12 tablet 3    ammonium lactate (AMLACTIN) 12 % cream RUB IN TO AFFECTED AREA WELL

## 2023-09-21 NOTE — TELEPHONE ENCOUNTER
Patient has an appointment with Dr. Jeffrey Putnam on 10/4/23. She would like to know if Dr. Jfefrey Putnam could put in an order for labs w/cholesterol, so she could have her labs done before her appointment.     Donal Cook - [phone]

## 2023-09-27 ENCOUNTER — TELEPHONE (OUTPATIENT)
Age: 78
End: 2023-09-27

## 2023-09-27 NOTE — TELEPHONE ENCOUNTER
Patient had a virtual appointment with Akshat Simpson on 9/21/23, for [facility], and was prescribed Prednisone. She thinks she is having a reaction to the medication. She has been having a constant headache, dizziness and slight nausea. She would like a nurse to call her back.     Early Mention - [phone]

## 2023-09-27 NOTE — TELEPHONE ENCOUNTER
Pt returned call, ID x 2. Pt called requesting NP's advice as to how she can taper off Prednisone earlier than directions on bottle. Pt has c/o a severe HA since starting it 9/21/23 for poison ivy. Pt also has c/o HA, nausea dizziness at times. Pt denies SOB, swelling, rash or difficulty breathing. Pt reported she took 3 tabs yesterday, she has had one tab today. Pt stated she would like to take 2 tabs today, 2 tabs tomorrow and 1 tab on Friday \"and be done with taking it. \"

## 2023-09-28 NOTE — TELEPHONE ENCOUNTER
Negra Brown MD  [facility] Team Two 15 hours ago (5:10 PM)       It is fine for her to taper that way.

## 2023-10-04 ENCOUNTER — OFFICE VISIT (OUTPATIENT)
Age: 78
End: 2023-10-04
Payer: MEDICARE

## 2023-10-04 VITALS
WEIGHT: 147 LBS | DIASTOLIC BLOOD PRESSURE: 90 MMHG | SYSTOLIC BLOOD PRESSURE: 140 MMHG | TEMPERATURE: 97.9 F | HEIGHT: 64 IN | HEART RATE: 62 BPM | RESPIRATION RATE: 16 BRPM | OXYGEN SATURATION: 96 % | BODY MASS INDEX: 25.1 KG/M2

## 2023-10-04 DIAGNOSIS — R10.13 DYSPEPSIA: ICD-10-CM

## 2023-10-04 DIAGNOSIS — F32.A DEPRESSION, UNSPECIFIED DEPRESSION TYPE: ICD-10-CM

## 2023-10-04 DIAGNOSIS — Z79.899 ENCOUNTER FOR LONG-TERM (CURRENT) USE OF MEDICATIONS: ICD-10-CM

## 2023-10-04 DIAGNOSIS — Z23 NEEDS FLU SHOT: ICD-10-CM

## 2023-10-04 DIAGNOSIS — E03.9 ACQUIRED HYPOTHYROIDISM: Primary | ICD-10-CM

## 2023-10-04 DIAGNOSIS — M81.0 AGE-RELATED OSTEOPOROSIS WITHOUT CURRENT PATHOLOGICAL FRACTURE: ICD-10-CM

## 2023-10-04 LAB
ALBUMIN SERPL-MCNC: 4.2 G/DL (ref 3.8–4.8)
ALBUMIN/GLOB SERPL: 2.2 {RATIO} (ref 1.2–2.2)
ALP SERPL-CCNC: 49 IU/L (ref 44–121)
ALT SERPL-CCNC: 15 IU/L (ref 0–32)
AST SERPL-CCNC: 21 IU/L (ref 0–40)
BASOPHILS # BLD AUTO: 0 X10E3/UL (ref 0–0.2)
BASOPHILS NFR BLD AUTO: 1 %
BILIRUB SERPL-MCNC: 0.3 MG/DL (ref 0–1.2)
BUN SERPL-MCNC: 14 MG/DL (ref 8–27)
BUN/CREAT SERPL: 16 (ref 12–28)
CALCIUM SERPL-MCNC: 8.9 MG/DL (ref 8.7–10.3)
CHLORIDE SERPL-SCNC: 100 MMOL/L (ref 96–106)
CHOLEST SERPL-MCNC: 248 MG/DL (ref 100–199)
CO2 SERPL-SCNC: 27 MMOL/L (ref 20–29)
CREAT SERPL-MCNC: 0.86 MG/DL (ref 0.57–1)
EGFRCR SERPLBLD CKD-EPI 2021: 70 ML/MIN/1.73
EOSINOPHIL # BLD AUTO: 0.2 X10E3/UL (ref 0–0.4)
EOSINOPHIL NFR BLD AUTO: 5 %
ERYTHROCYTE [DISTWIDTH] IN BLOOD BY AUTOMATED COUNT: 13.6 % (ref 11.7–15.4)
GLOBULIN SER CALC-MCNC: 1.9 G/DL (ref 1.5–4.5)
GLUCOSE SERPL-MCNC: 84 MG/DL (ref 70–99)
HCT VFR BLD AUTO: 44.3 % (ref 34–46.6)
HDLC SERPL-MCNC: 49 MG/DL
HGB BLD-MCNC: 14.3 G/DL (ref 11.1–15.9)
IMM GRANULOCYTES # BLD AUTO: 0 X10E3/UL (ref 0–0.1)
IMM GRANULOCYTES NFR BLD AUTO: 1 %
LDLC SERPL CALC-MCNC: 161 MG/DL (ref 0–99)
LYMPHOCYTES # BLD AUTO: 1.4 X10E3/UL (ref 0.7–3.1)
LYMPHOCYTES NFR BLD AUTO: 29 %
MCH RBC QN AUTO: 29.5 PG (ref 26.6–33)
MCHC RBC AUTO-ENTMCNC: 32.3 G/DL (ref 31.5–35.7)
MCV RBC AUTO: 92 FL (ref 79–97)
MONOCYTES # BLD AUTO: 0.7 X10E3/UL (ref 0.1–0.9)
MONOCYTES NFR BLD AUTO: 14 %
NEUTROPHILS # BLD AUTO: 2.4 X10E3/UL (ref 1.4–7)
NEUTROPHILS NFR BLD AUTO: 50 %
PLATELET # BLD AUTO: 226 X10E3/UL (ref 150–450)
POTASSIUM SERPL-SCNC: 4.6 MMOL/L (ref 3.5–5.2)
PROT SERPL-MCNC: 6.1 G/DL (ref 6–8.5)
RBC # BLD AUTO: 4.84 X10E6/UL (ref 3.77–5.28)
SODIUM SERPL-SCNC: 140 MMOL/L (ref 134–144)
T4 FREE SERPL-MCNC: 1.3 NG/DL (ref 0.82–1.77)
TRIGL SERPL-MCNC: 209 MG/DL (ref 0–149)
TSH SERPL DL<=0.005 MIU/L-ACNC: 6.38 UIU/ML (ref 0.45–4.5)
VLDLC SERPL CALC-MCNC: 38 MG/DL (ref 5–40)
WBC # BLD AUTO: 4.7 X10E3/UL (ref 3.4–10.8)

## 2023-10-04 PROCEDURE — 1123F ACP DISCUSS/DSCN MKR DOCD: CPT | Performed by: INTERNAL MEDICINE

## 2023-10-04 PROCEDURE — 1090F PRES/ABSN URINE INCON ASSESS: CPT | Performed by: INTERNAL MEDICINE

## 2023-10-04 PROCEDURE — G8419 CALC BMI OUT NRM PARAM NOF/U: HCPCS | Performed by: INTERNAL MEDICINE

## 2023-10-04 PROCEDURE — PBSHW INFLUENZA, FLUAD, (AGE 65 Y+), IM, PF, 0.5 ML: Performed by: INTERNAL MEDICINE

## 2023-10-04 PROCEDURE — G8399 PT W/DXA RESULTS DOCUMENT: HCPCS | Performed by: INTERNAL MEDICINE

## 2023-10-04 PROCEDURE — G8484 FLU IMMUNIZE NO ADMIN: HCPCS | Performed by: INTERNAL MEDICINE

## 2023-10-04 PROCEDURE — 99214 OFFICE O/P EST MOD 30 MIN: CPT | Performed by: INTERNAL MEDICINE

## 2023-10-04 PROCEDURE — 90694 VACC AIIV4 NO PRSRV 0.5ML IM: CPT | Performed by: INTERNAL MEDICINE

## 2023-10-04 PROCEDURE — G8427 DOCREV CUR MEDS BY ELIG CLIN: HCPCS | Performed by: INTERNAL MEDICINE

## 2023-10-04 PROCEDURE — 1036F TOBACCO NON-USER: CPT | Performed by: INTERNAL MEDICINE

## 2023-10-04 RX ORDER — LEVOTHYROXINE SODIUM 0.05 MG/1
TABLET ORAL
Qty: 108 TABLET | Refills: 1 | Status: SHIPPED | OUTPATIENT
Start: 2023-10-04

## 2023-10-04 SDOH — ECONOMIC STABILITY: FOOD INSECURITY: WITHIN THE PAST 12 MONTHS, THE FOOD YOU BOUGHT JUST DIDN'T LAST AND YOU DIDN'T HAVE MONEY TO GET MORE.: NEVER TRUE

## 2023-10-04 SDOH — ECONOMIC STABILITY: INCOME INSECURITY: HOW HARD IS IT FOR YOU TO PAY FOR THE VERY BASICS LIKE FOOD, HOUSING, MEDICAL CARE, AND HEATING?: NOT HARD AT ALL

## 2023-10-04 SDOH — ECONOMIC STABILITY: HOUSING INSECURITY
IN THE LAST 12 MONTHS, WAS THERE A TIME WHEN YOU DID NOT HAVE A STEADY PLACE TO SLEEP OR SLEPT IN A SHELTER (INCLUDING NOW)?: NO

## 2023-10-04 SDOH — ECONOMIC STABILITY: FOOD INSECURITY: WITHIN THE PAST 12 MONTHS, YOU WORRIED THAT YOUR FOOD WOULD RUN OUT BEFORE YOU GOT MONEY TO BUY MORE.: NEVER TRUE

## 2023-10-04 NOTE — PATIENT INSTRUCTIONS
Avoid following foods for the next 2-3 weeks.    Coffee    Tea    Sodas   Alcohol   Cheese   Salads - raw onions, peppers, & beans    [place] food   [place] foods   [place] foods   No vinegars or citrus foods   No dairy other than yogurt   Chips   Fried foods   Red meats   Chocolate   Peppermints           Chewing Gum

## 2023-10-24 NOTE — PROGRESS NOTES
JONO CM removed from care team. Subjective:      Jose R Luo is a 76 y.o. female who presents today for follow up of her depression,  hypothyroid and hyperlipidemia. She is taking levothyroxine 100mg twice weekly and 50mcg all other days. She is due for her Medicare Wellness Exam     Swimmingor walking - outdoors. 4 times per week. No new concerns at this time. Patient Active Problem List   Diagnosis Code    Acquired hypothyroidism E03.9    Hypercholesteremia E78.00    History of colonoscopy Z98.890    Environmental allergies Z91.09    Xerosis of skin L85.3    Vitamin D deficiency E55.9    Osteopenia M85.80    Adjustment disorder F43.20    Migraine headache G43.909    Family history of breast cancer in first degree relative Z80.3    H/O screening mammography Z92.89    HALEY (obstructive sleep apnea) G47.33     Current Outpatient Medications   Medication Sig Dispense Refill    ammonium lactate (AMLACTIN) 12 % topical cream rub in to affected area well 280 g 0    escitalopram oxalate (Lexapro) 10 mg tablet Take 1 Tab by mouth daily. 90 Tab 1    levothyroxine (SYNTHROID) 50 mcg tablet TAKE ONE TABLET BY MOUTH DAILY BEFORE BREAKFAST WITH THE EXCEPTION OF2 DAYS PER WEEK SHE TAKES 2 TABLETS. 120 Tab 1    loratadine (CLARITIN) 10 mg tablet Take 1 Tab by mouth daily. (Patient taking differently: Take 10 mg by mouth daily as needed.) 90 Tab 2    estradiol (ESTRACE) 0.01 % (0.1 mg/gram) vaginal cream Indications: pt has not started Rx at this time. Review of Systems    Pertinent items are noted in HPI. Objective:      Wt Readings from Last 3 Encounters:   06/18/20 144 lb (65.3 kg)   05/04/20 145 lb (65.8 kg)   03/12/20 147 lb (66.7 kg)     BP Readings from Last 3 Encounters:   06/18/20 119/70   03/12/20 130/68   03/09/20 129/84     Visit Vitals  /83   Pulse 61   Temp 97.3 °F (36.3 °C) (Temporal)   Resp 16   Ht 5' 3\" (1.6 m)   Wt 147 lb 3.2 oz (66.8 kg)   SpO2 97%   BMI 26.08 kg/m²     General appearance: alert, cooperative, no distress, appears stated age  Head: Normocephalic, without obvious abnormality, atraumatic  Neck: supple, symmetrical, trachea midline, no adenopathy, thyroid: not enlarged, symmetric, no tenderness/mass/nodules, no carotid bruit and no JVD  Lungs: clear to auscultation bilaterally  Heart: regular rate and rhythm, S1, S2 normal, no murmur, click, rub or gallop  Abdomen: soft, non-tender. Bowel sounds normal. No masses,  no organomegaly  Extremities: extremities normal, atraumatic, no cyanosis or edema  Pulses: 2+ and symmetric    Assessment/Plan:     1. Hypothyroidism, adult  -clinically euthyroid. - CBC WITH AUTOMATED DIFF; Future  - METABOLIC PANEL, COMPREHENSIVE; Future  - TSH 3RD GENERATION; Future  - T4, FREE; Future    2. Depression, unspecified depression type  -I evaluated and recommended to continue current doses of medications. 3. Mixed hyperlipidemia  -last done 2/2019was significantly elevated. she has increased her exercise    - LIPID PANEL; Future    4. Encounter for long-term (current) use of medications      5. Medicare annual wellness visit, subsequent  -completed today. - DEPRESSION SCREEN ANNUAL    6. Screening for depression    - DEPRESSION SCREEN ANNUAL    7. Xerosis of skin  -amlactin refilled.      - ammonium lactate (AMLACTIN) 12 % topical cream; rub in to affected area well  Dispense: 280 g; Refill: 0     Orders Placed This Encounter   [address] 8-15 MIN    CBC WITH AUTOMATED DIFF     Standing Status:   Future     Number of Occurrences:   1     Standing Expiration Date:   6/9/9734    METABOLIC PANEL, COMPREHENSIVE     Standing Status:   Future     Number of Occurrences:   1     Standing Expiration Date:   1/4/2022    TSH 3RD GENERATION     Standing Status:   Future     Number of Occurrences:   1     Standing Expiration Date:   1/4/2022    T4, FREE     Standing Status:   Future     Number of Occurrences:   1     Standing Expiration Date:   1/4/2022    LIPID PANEL     Standing Status:   Future     Number of Occurrences:   1     Standing Expiration Date:   1/4/2022    ammonium lactate (AMLACTIN) 12 % topical cream     Sig: rub in to affected area well     Dispense:  280 g     Refill:  0    escitalopram oxalate (Lexapro) 10 mg tablet     Sig: Take 1 Tab by mouth daily. Dispense:  90 Tab     Refill:  1    levothyroxine (SYNTHROID) 50 mcg tablet     Sig: TAKE ONE TABLET BY MOUTH DAILY BEFORE BREAKFAST WITH THE EXCEPTION OF2 DAYS PER WEEK SHE TAKES 2 TABLETS. Dispense:  120 Tab     Refill:  1      Follow-up Disposition:     Follow up in 6 months     Return if symptoms worsen or fail to improve. Advised patient to call back or return to office if symptoms worsen/change/persist.     Discussed expected course/resolution/complications of diagnosis in detail with patient. Medication risks/benefits/costs/interactions/alternatives discussed with patient. Patient was given an after visit summary which includes diagnoses, current medications, & vitals. Patient expressed understanding with the diagnosis and plan. This is the Subsequent Medicare Annual Wellness Exam, performed 12 months or more after the Initial AWV or the last Subsequent AWV    I have reviewed the patient's medical history in detail and updated the computerized patient record. Depression Risk Factor Screening:     3 most recent PHQ Screens 1/4/2021   PHQ Not Done -   Little interest or pleasure in doing things Not at all   Feeling down, depressed, irritable, or hopeless Not at all   Total Score PHQ 2 0       Alcohol Risk Screen    Do you average more than 1 drink per night or more than 7 drinks a week:  No    On any one occasion in the past three months have you have had more than 3 drinks containing alcohol:  No        Functional Ability and Level of Safety:    Hearing: Hearing is good. Activities of Daily Living:   The home contains: no safety equipment. Patient does total self care      Ambulation: with no difficulty     Fall Risk:  Fall Risk Assessment, last 12 mths 3/3/2020   Able to walk? Yes   Fall in past 12 months? No      Abuse Screen:  Patient is not abused       Cognitive Screening    Has your family/caregiver stated any concerns about your memory:          Assessment/Plan   Education and counseling provided:  Are appropriate based on today's review and evaluation    Diagnoses and all orders for this visit:    1. Medicare annual wellness visit, subsequent  -     [facility]    2. Hypothyroidism, adult    3. Depression, unspecified depression type    4. Mixed hyperlipidemia    5. Encounter for long-term (current) use of medications    6. Screening for depression  -     DEPRESSION SCREEN ANNUAL    Other orders  -     CBC WITH AUTOMATED DIFF; Future  -     METABOLIC PANEL, COMPREHENSIVE; Future  -     TSH 3RD GENERATION; Future  -     T4, FREE; Future        Health Maintenance Due   There are no preventive care reminders to display for this patient.     Patient Care Team   Patient Care Team:  Ace Hernández MD as PCP - General (Internal Medicine)  Ace Hernández MD as PCP - [facility] Empaneled Provider  Isabella Amador MD (Otolaryngology)  Jsoe Rojas MD (Otolaryngology)  Nirali Chavira MD (Dermatology)  Barbi David MD (Ophthalmology)    History     Patient Active Problem List   Diagnosis Code    Acquired hypothyroidism E03.9    Hypercholesteremia E78.00    History of colonoscopy Z98.890    Environmental allergies Z91.09    Xerosis of skin L85.3    Vitamin D deficiency E55.9    Osteopenia M85.80    Adjustment disorder F43.20    Migraine headache G43.909    Family history of breast cancer in first degree relative Z80.3    H/O screening mammography Z92.89    HALEY (obstructive sleep apnea) G47.33     Past Medical History:   Diagnosis Date    Adverse effect of anesthesia     SLOW WAKING PAST ANESTHESIA, SEVERE HEADACHE    Depression     Headache(784.0)     migraines    Hypercholesterolemia     Hypothyroid     Nephrolithiasis     Scoliosis       Past Surgical History:   Procedure Laterality Date    ENDOSCOPY, COLON, DIAGNOSTIC  2009    neg. ([facility])-f/u 5 yrs.  HX CATARACT REMOVAL Right 06/2019    HX DILATION AND CURETTAGE  1986    benign    HX GI      COLONOSCOPY    HX ORTHOPAEDIC Right 01/17/2019    ACL replacement     HX TONSILLECTOMY  childhood    T&A     Current Outpatient Medications   Medication Sig Dispense Refill    ammonium lactate (AMLACTIN) 12 % topical cream RUB IN TO AFFECTED AREA WELL. 280 g 0    escitalopram oxalate (Lexapro) 10 mg tablet Take 1 Tab by mouth daily. 90 Tab 1    loratadine (CLARITIN) 10 mg tablet Take 1 Tab by mouth daily. (Patient taking differently: Take 10 mg by mouth daily as needed.) 90 Tab 2    estradiol (ESTRACE) 0.01 % (0.1 mg/gram) vaginal cream Indications: pt has not started Rx at this time.        Allergies   Allergen Reactions    Talwin [Pentazocine Lactate] Unknown (comments)     Altered sensorium    Amoxicillin Other (comments)     Diarrhea, stomach upset    Darvon [Propoxyphene] Unknown (comments)    Diamox Other (comments)     migraine    Morphine Other (comments)     Pt states she is very sensitive to all narcotics, feels like she will pass out    Simvastatin Other (comments)     Headache    Zithromax [Azithromycin] Other (comments)     depression       Family History   Problem Relation Age of Onset    Hypertension Mother     Heart Disease Mother 61        CHF/etoh    Arrhythmia Mother     Heart Failure Mother     Diabetes Father     Coronary Artery Disease Father 61        CABG (twice)    Heart Disease Father     Heart Surgery Father     Breast Cancer Sister 61    Cancer Sister         BREAST    Heart Disease Brother     Anesth Problems Neg Hx      Social History     Tobacco Use    Smoking status: Former Smoker     Years: 10.00     Types: Cigarettes Quit date: 1970     Years since quittin.0    Smokeless tobacco: Never Used   Substance Use Topics    Alcohol use:  Yes     Alcohol/week: 1.0 - 2.0 standard drinks     Types: 1 Shots of liquor per week     Comment: OCCASIONALLY

## 2023-11-21 ENCOUNTER — E-VISIT (OUTPATIENT)
Age: 78
End: 2023-11-21

## 2023-11-21 ENCOUNTER — TELEPHONE (OUTPATIENT)
Age: 78
End: 2023-11-21

## 2023-11-21 DIAGNOSIS — Z20.822 CLOSE EXPOSURE TO COVID-19 VIRUS: Primary | ICD-10-CM

## 2023-11-21 ASSESSMENT — LIFESTYLE VARIABLES
SMOKING_STATUS: NO, BUT I USED TO SMOKE
SMOKING_YEARS: 7
PACKS_PER_DAY: 0

## 2023-11-21 NOTE — PROGRESS NOTES
Jair Smooth (1945) initiated an asynchronous digital communication through [address] [place] [address]. HPI: per patient questionnaire     Exam: not applicable    Diagnoses and all orders for this visit:  Diagnoses and all orders for this visit:    Close exposure to COVID-19 virus    -patient requestin ivermectin. Discussed with patient this is not approved nor effective for prevention of covid once exposed. -monitor for symptoms. Test for covid once symptoms appear, may use paxlovid or molnupiravir if covid positive. Has had her covid vaccinations. Time: EV1 - 5-10 minutes were spent on the digital evaluation and management of this patient.     Neli Cabral MD

## 2023-11-21 NOTE — TELEPHONE ENCOUNTER
Patient called and said that her significant other tested positive for Covid yesterday. She would like to know if Dr. Tevin Sim would send in a prescription for Ivermectin as a prophylaxis.     Blaire Aguila - [phone]

## 2023-12-15 RX ORDER — ESCITALOPRAM OXALATE 10 MG/1
TABLET ORAL
Qty: 90 TABLET | Refills: 1 | Status: SHIPPED | OUTPATIENT
Start: 2023-12-15

## 2024-01-03 ENCOUNTER — TELEPHONE (OUTPATIENT)
Age: 79
End: 2024-01-03

## 2024-01-03 NOTE — TELEPHONE ENCOUNTER
Reason for call:  TC from pt. Pt id verified. Pt states she is having a reaction to one of her medications and would like to speak with a nurse to discuss.     Is this a new problem: Yes    Date of last appointment:  11/21/2023     Can we respond via [employer]: No    Best call back number: [phone]

## 2024-01-04 ENCOUNTER — TELEPHONE (OUTPATIENT)
Age: 79
End: 2024-01-04

## 2024-01-04 NOTE — TELEPHONE ENCOUNTER
On call message 1/3  Patient with concern about worsening mood symptoms since starting amoxicillin last week for strep diagnosed at urgent care. Advised she DC med at this time as she had near complete course, observe for improvements and if not feeling better should contact PCP

## 2024-01-04 NOTE — TELEPHONE ENCOUNTER
Returned patient call.  She stated she was out of town and went to an urgent care for a ear infection.  She was given Amoxicillin.  She started feeling depress and had thoughts of suicide.  She called the on call provider and was advised to stop the medication.  Patient stated she feels much better.  Patient was also told amoxicillin was listed in her allergies since 2013.

## 2024-01-08 RX ORDER — LEVOTHYROXINE SODIUM 0.05 MG/1
TABLET ORAL
Qty: 108 TABLET | Refills: 0 | OUTPATIENT
Start: 2024-01-08

## 2024-01-08 NOTE — TELEPHONE ENCOUNTER
Chief Complaint   Patient presents with    Medication Refill     Last Appointment with Dr. Crenshaw:  11/21/2023   Future Appointments   Date Time Provider Department Center   4/11/2024 10:00 AM Nichole Crenshaw MD [facility]

## 2024-01-10 ENCOUNTER — PATIENT MESSAGE (OUTPATIENT)
Age: 79
End: 2024-01-10

## 2024-01-10 ENCOUNTER — TELEPHONE (OUTPATIENT)
Age: 79
End: 2024-01-10

## 2024-01-10 RX ORDER — TRAZODONE HYDROCHLORIDE 50 MG/1
25 TABLET ORAL NIGHTLY PRN
Qty: 2 TABLET | Refills: 0 | Status: SHIPPED | OUTPATIENT
Start: 2024-01-10

## 2024-01-10 NOTE — TELEPHONE ENCOUNTER
Reason for call:  TC from pt. Pt id verified. Pt states she would like to speak with nurse re international travel, Covid booster, and needing some medications.     Is this a new problem: Yes    Date of last appointment:  11/21/2023     Can we respond via [employer]: No    Best call back number: [phone]

## 2024-01-10 NOTE — TELEPHONE ENCOUNTER
Pt returned call. Pt states she is flying out to [place] on 1/20/24 and returning on 2/10/24.  She said it was advised for her to get printed script for her Lexapro and Synthroid to take with her. She is also requesting MD to send in Ambien for her 17 hour flight both ways.  Her other concern was whether she should get newest covid vaccine? Does MD feel she needs it? Will forward to MD.

## 2024-01-10 NOTE — TELEPHONE ENCOUNTER
1/10/2024 spoke with patient.     -traveling to [place] for 2 weeks.  Has a 17 hour flight and her friends recommend ambien for the flight.  She has never taken this medication before. - I recommended she use a small dose of trazodone if needed as sleep aid.  Discussed with her that although I would like for her to be able to rest on the flight, I do not want her to be so sedated that if there is an emergency of some sort in flight, she won't be able to to care for herself.     -she is also requesting a written prescription for her medications.  She states that is is just in case her medications are lost and she can get it filled in [place]

## 2024-01-10 NOTE — TELEPHONE ENCOUNTER
Left detailed message for pt on her vm I would send her a My Chart message with travel clinics to call. This has been done. Will forward to MD.

## 2024-04-02 RX ORDER — LEVOTHYROXINE SODIUM 0.05 MG/1
TABLET ORAL
Qty: 108 TABLET | Refills: 0 | Status: SHIPPED | OUTPATIENT
Start: 2024-04-02

## 2024-04-02 NOTE — TELEPHONE ENCOUNTER
Last seen 10/04/2023    NEXT APPT  With Internal Medicine (Nichole Crenshaw MD)  04/11/2024 at 10:00 AM      Last filled 10/04/23

## 2024-04-10 NOTE — PROGRESS NOTES
-    Medicare Annual Wellness Visit    Annemarie Perdomo is here for Medicare AWV (Patient here today for medicare wellness exam and refill fosamax.[facility] )    Assessment & Plan   Medicare annual wellness visit, subsequent  -has established ACP.  Requested copy for chart.   -[facility] decision maker reviewed with patient and updated.      2. Localized osteoporosis without current pathological fracture  -recommended restarting fosamax.  He had stopped when she got ill around [place]. Refill sent to her pharmacy.   -need DXA next year.     3. Acquired hypothyroidism  -taking levothyroxine 50mcg daily.   -appears clinically euthyroid.   -check TSH    -     Comprehensive Metabolic Panel; Future  -     TSH; Future  -     T4, Free; Future    4. Depression, unspecified depression type  -mood is very well controlled.    -will continue lexapro 10mg daily     5. Gait instability  -recommended PT.     -     External Referral To Physical Therapy      Orders Placed This Encounter    Comprehensive Metabolic Panel     Standing Status:   Future     Number of Occurrences:   1     Standing Expiration Date:   4/11/2025    TSH     Standing Status:   Future     Number of Occurrences:   1     Standing Expiration Date:   4/11/2025    T4, Free     Standing Status:   Future     Number of Occurrences:   1     Standing Expiration Date:   4/11/2025    External Referral To Physical Therapy     Referral Priority:   Routine     Referral Type:   Eval and Treat     Referral Reason:   Specialty Services Required     Requested Specialty:   Physical Therapist     Number of Visits Requested:   1    alendronate (FOSAMAX) 70 MG tablet     Sig: Take 1 tablet by mouth every 7 days Take with a full glass of water upon arising for the day. Consume on an empty stomach at least 30 minutes before the first food, beverage, or medication. Stay upright (do not lie down) for at least 30 minutes. Do not crush or break.     Dispense:  12 tablet     Refill:

## 2024-04-11 ENCOUNTER — OFFICE VISIT (OUTPATIENT)
Age: 79
End: 2024-04-11
Payer: MEDICARE

## 2024-04-11 VITALS
HEIGHT: 63 IN | TEMPERATURE: 97.2 F | BODY MASS INDEX: 26.33 KG/M2 | RESPIRATION RATE: 16 BRPM | OXYGEN SATURATION: 97 % | DIASTOLIC BLOOD PRESSURE: 86 MMHG | SYSTOLIC BLOOD PRESSURE: 134 MMHG | HEART RATE: 58 BPM | WEIGHT: 148.6 LBS

## 2024-04-11 DIAGNOSIS — M81.6 LOCALIZED OSTEOPOROSIS WITHOUT CURRENT PATHOLOGICAL FRACTURE: ICD-10-CM

## 2024-04-11 DIAGNOSIS — Z00.00 MEDICARE ANNUAL WELLNESS VISIT, SUBSEQUENT: Primary | ICD-10-CM

## 2024-04-11 DIAGNOSIS — E03.9 ACQUIRED HYPOTHYROIDISM: ICD-10-CM

## 2024-04-11 DIAGNOSIS — F32.A DEPRESSION, UNSPECIFIED DEPRESSION TYPE: ICD-10-CM

## 2024-04-11 DIAGNOSIS — R26.81 GAIT INSTABILITY: ICD-10-CM

## 2024-04-11 LAB
ALBUMIN SERPL-MCNC: 4 G/DL (ref 3.5–5)
ALBUMIN/GLOB SERPL: 1.4 (ref 1.1–2.2)
ALP SERPL-CCNC: 50 U/L (ref 45–117)
ALT SERPL-CCNC: 26 U/L (ref 12–78)
ANION GAP SERPL CALC-SCNC: 3 MMOL/L (ref 5–15)
AST SERPL-CCNC: 26 U/L (ref 15–37)
BILIRUB SERPL-MCNC: 0.5 MG/DL (ref 0.2–1)
BUN SERPL-MCNC: 13 MG/DL (ref 6–20)
BUN/CREAT SERPL: 18 (ref 12–20)
CALCIUM SERPL-MCNC: 9.6 MG/DL (ref 8.5–10.1)
CHLORIDE SERPL-SCNC: 107 MMOL/L (ref 97–108)
CO2 SERPL-SCNC: 28 MMOL/L (ref 21–32)
CREAT SERPL-MCNC: 0.73 MG/DL (ref 0.55–1.02)
GLOBULIN SER CALC-MCNC: 2.8 G/DL (ref 2–4)
GLUCOSE SERPL-MCNC: 90 MG/DL (ref 65–100)
POTASSIUM SERPL-SCNC: 4.4 MMOL/L (ref 3.5–5.1)
PROT SERPL-MCNC: 6.8 G/DL (ref 6.4–8.2)
SODIUM SERPL-SCNC: 138 MMOL/L (ref 136–145)
T4 FREE SERPL-MCNC: 1 NG/DL (ref 0.8–1.5)
TSH SERPL DL<=0.05 MIU/L-ACNC: 1.99 UIU/ML (ref 0.36–3.74)

## 2024-04-11 PROCEDURE — 99213 OFFICE O/P EST LOW 20 MIN: CPT | Performed by: INTERNAL MEDICINE

## 2024-04-11 RX ORDER — ALENDRONATE SODIUM 70 MG/1
70 TABLET ORAL
Qty: 12 TABLET | Refills: 3 | Status: SHIPPED | OUTPATIENT
Start: 2024-04-11

## 2024-04-11 SDOH — ECONOMIC STABILITY: INCOME INSECURITY: HOW HARD IS IT FOR YOU TO PAY FOR THE VERY BASICS LIKE FOOD, HOUSING, MEDICAL CARE, AND HEATING?: NOT HARD AT ALL

## 2024-04-11 SDOH — ECONOMIC STABILITY: FOOD INSECURITY: WITHIN THE PAST 12 MONTHS, THE FOOD YOU BOUGHT JUST DIDN'T LAST AND YOU DIDN'T HAVE MONEY TO GET MORE.: NEVER TRUE

## 2024-04-11 SDOH — ECONOMIC STABILITY: FOOD INSECURITY: WITHIN THE PAST 12 MONTHS, YOU WORRIED THAT YOUR FOOD WOULD RUN OUT BEFORE YOU GOT MONEY TO BUY MORE.: NEVER TRUE

## 2024-04-11 ASSESSMENT — PATIENT HEALTH QUESTIONNAIRE - PHQ9
SUM OF ALL RESPONSES TO PHQ9 QUESTIONS 1 & 2: 0
1. LITTLE INTEREST OR PLEASURE IN DOING THINGS: NOT AT ALL
9. THOUGHTS THAT YOU WOULD BE BETTER OFF DEAD, OR OF HURTING YOURSELF: NOT AT ALL
SUM OF ALL RESPONSES TO PHQ QUESTIONS 1-9: 0
3. TROUBLE FALLING OR STAYING ASLEEP: NOT AT ALL
2. FEELING DOWN, DEPRESSED OR HOPELESS: NOT AT ALL
5. POOR APPETITE OR OVEREATING: NOT AT ALL
6. FEELING BAD ABOUT YOURSELF - OR THAT YOU ARE A FAILURE OR HAVE LET YOURSELF OR YOUR FAMILY DOWN: NOT AT ALL
SUM OF ALL RESPONSES TO PHQ QUESTIONS 1-9: 0
SUM OF ALL RESPONSES TO PHQ QUESTIONS 1-9: 0
10. IF YOU CHECKED OFF ANY PROBLEMS, HOW DIFFICULT HAVE THESE PROBLEMS MADE IT FOR YOU TO DO YOUR WORK, TAKE CARE OF THINGS AT HOME, OR GET ALONG WITH OTHER PEOPLE: NOT DIFFICULT AT ALL
4. FEELING TIRED OR HAVING LITTLE ENERGY: NOT AT ALL
SUM OF ALL RESPONSES TO PHQ QUESTIONS 1-9: 0
8. MOVING OR SPEAKING SO SLOWLY THAT OTHER PEOPLE COULD HAVE NOTICED. OR THE OPPOSITE, BEING SO FIGETY OR RESTLESS THAT YOU HAVE BEEN MOVING AROUND A LOT MORE THAN USUAL: NOT AT ALL
7. TROUBLE CONCENTRATING ON THINGS, SUCH AS READING THE NEWSPAPER OR WATCHING TELEVISION: NOT AT ALL

## 2024-04-11 ASSESSMENT — LIFESTYLE VARIABLES
HOW OFTEN DO YOU HAVE A DRINK CONTAINING ALCOHOL: MONTHLY OR LESS
HOW MANY STANDARD DRINKS CONTAINING ALCOHOL DO YOU HAVE ON A TYPICAL DAY: 1 OR 2

## 2024-04-15 ENCOUNTER — OFFICE VISIT (OUTPATIENT)
Age: 79
End: 2024-04-15
Payer: MEDICARE

## 2024-04-15 VITALS
TEMPERATURE: 98.8 F | DIASTOLIC BLOOD PRESSURE: 84 MMHG | OXYGEN SATURATION: 96 % | HEIGHT: 62 IN | BODY MASS INDEX: 27.18 KG/M2 | SYSTOLIC BLOOD PRESSURE: 131 MMHG | HEART RATE: 68 BPM | RESPIRATION RATE: 16 BRPM

## 2024-04-15 DIAGNOSIS — J06.9 VIRAL URI: Primary | ICD-10-CM

## 2024-04-15 PROCEDURE — G8419 CALC BMI OUT NRM PARAM NOF/U: HCPCS | Performed by: INTERNAL MEDICINE

## 2024-04-15 PROCEDURE — 1036F TOBACCO NON-USER: CPT | Performed by: INTERNAL MEDICINE

## 2024-04-15 PROCEDURE — 99213 OFFICE O/P EST LOW 20 MIN: CPT | Performed by: INTERNAL MEDICINE

## 2024-04-15 PROCEDURE — 1123F ACP DISCUSS/DSCN MKR DOCD: CPT | Performed by: INTERNAL MEDICINE

## 2024-04-15 PROCEDURE — G8427 DOCREV CUR MEDS BY ELIG CLIN: HCPCS | Performed by: INTERNAL MEDICINE

## 2024-04-15 PROCEDURE — 1090F PRES/ABSN URINE INCON ASSESS: CPT | Performed by: INTERNAL MEDICINE

## 2024-04-15 PROCEDURE — G8399 PT W/DXA RESULTS DOCUMENT: HCPCS | Performed by: INTERNAL MEDICINE

## 2024-04-15 RX ORDER — BENZONATATE 100 MG/1
100-200 CAPSULE ORAL 3 TIMES DAILY PRN
Qty: 60 CAPSULE | Refills: 0 | Status: SHIPPED | OUTPATIENT
Start: 2024-04-15

## 2024-04-15 ASSESSMENT — PATIENT HEALTH QUESTIONNAIRE - PHQ9
3. TROUBLE FALLING OR STAYING ASLEEP: NOT AT ALL
SUM OF ALL RESPONSES TO PHQ9 QUESTIONS 1 & 2: 0
1. LITTLE INTEREST OR PLEASURE IN DOING THINGS: NOT AT ALL
SUM OF ALL RESPONSES TO PHQ QUESTIONS 1-9: 0
4. FEELING TIRED OR HAVING LITTLE ENERGY: NOT AT ALL
9. THOUGHTS THAT YOU WOULD BE BETTER OFF DEAD, OR OF HURTING YOURSELF: NOT AT ALL
6. FEELING BAD ABOUT YOURSELF - OR THAT YOU ARE A FAILURE OR HAVE LET YOURSELF OR YOUR FAMILY DOWN: NOT AT ALL
7. TROUBLE CONCENTRATING ON THINGS, SUCH AS READING THE NEWSPAPER OR WATCHING TELEVISION: NOT AT ALL
10. IF YOU CHECKED OFF ANY PROBLEMS, HOW DIFFICULT HAVE THESE PROBLEMS MADE IT FOR YOU TO DO YOUR WORK, TAKE CARE OF THINGS AT HOME, OR GET ALONG WITH OTHER PEOPLE: NOT DIFFICULT AT ALL
8. MOVING OR SPEAKING SO SLOWLY THAT OTHER PEOPLE COULD HAVE NOTICED. OR THE OPPOSITE, BEING SO FIGETY OR RESTLESS THAT YOU HAVE BEEN MOVING AROUND A LOT MORE THAN USUAL: NOT AT ALL
5. POOR APPETITE OR OVEREATING: NOT AT ALL
SUM OF ALL RESPONSES TO PHQ QUESTIONS 1-9: 0
2. FEELING DOWN, DEPRESSED OR HOPELESS: NOT AT ALL
SUM OF ALL RESPONSES TO PHQ QUESTIONS 1-9: 0
SUM OF ALL RESPONSES TO PHQ QUESTIONS 1-9: 0

## 2024-04-15 NOTE — PROGRESS NOTES
Assessment/Plan:     1. Viral URI  -encouraged continuation use of flonase 2 sprays each nostril daily.  -recommended rest and increase fluids  -given tessalon perles 100mg 1-2 tid prn cough.  -reviewed if fever spikes over 100, shortness of breath develops to follow up            Disclaimer:  Return if symptoms worsen or fail to improve.   Advised patient to call back or return to office if symptoms worsen/change/persist.     Discussed expected course/resolution/complications of diagnosis in detail with patient.   Medication risks/benefits/costs/interactions/alternatives discussed with patient.   Patient was given an after visit summary which includes diagnoses, current medications, & vitals.   Patient expressed understanding with the diagnosis and plan.        Subjective:      Annemarie Perdomo is a 78 y.o. female who presents today for uri    -started with sore throat -broken glass 3 days ago.  Went to urgent care in [place] Saturday.  Swabbed for covid and strep and both were negative.  Was prescribed flonase.  Has now developed a dry cough.  Feel warm, but hasn't checked temperature.       Objective:     /84   Pulse 68   Temp 98.8 °F (37.1 °C) (Oral)   Resp 16   Ht 1.575 m (5' 2\")   SpO2 96%   BMI 27.18 kg/m²   Physical Exam  Vitals and nursing note reviewed.   Constitutional:       Appearance: Normal appearance.   HENT:      Head: Normocephalic and atraumatic.      Right Ear: Tympanic membrane, ear canal and external ear normal.      Left Ear: Tympanic membrane, ear canal and external ear normal.      Mouth/Throat:      Mouth: Mucous membranes are moist.      Pharynx: Oropharynx is clear. No oropharyngeal exudate or posterior oropharyngeal erythema.   Pulmonary:      Effort: Pulmonary effort is normal.      Breath sounds: Normal breath sounds.   Neurological:      Mental Status: She is alert.

## 2024-06-24 RX ORDER — ESCITALOPRAM OXALATE 10 MG/1
TABLET ORAL
Qty: 90 TABLET | Refills: 0 | Status: SHIPPED | OUTPATIENT
Start: 2024-06-24

## 2024-06-24 RX ORDER — LEVOTHYROXINE SODIUM 0.05 MG/1
TABLET ORAL
Qty: 108 TABLET | Refills: 0 | Status: SHIPPED | OUTPATIENT
Start: 2024-06-24

## 2024-06-24 NOTE — TELEPHONE ENCOUNTER
Rx sent to pharmacy as previously filled and verified by Verbal Order Read Back with provider.    NV 10/15/2024

## 2024-09-23 RX ORDER — ESCITALOPRAM OXALATE 10 MG/1
TABLET ORAL
Qty: 90 TABLET | Refills: 0 | Status: SHIPPED | OUTPATIENT
Start: 2024-09-23

## 2024-09-23 RX ORDER — LEVOTHYROXINE SODIUM 50 UG/1
TABLET ORAL
Qty: 108 TABLET | Refills: 0 | Status: SHIPPED | OUTPATIENT
Start: 2024-09-23

## 2024-11-05 ENCOUNTER — OFFICE VISIT (OUTPATIENT)
Age: 79
End: 2024-11-05
Payer: MEDICARE

## 2024-11-05 VITALS
TEMPERATURE: 97.5 F | OXYGEN SATURATION: 96 % | HEART RATE: 72 BPM | HEIGHT: 62 IN | DIASTOLIC BLOOD PRESSURE: 80 MMHG | BODY MASS INDEX: 27.46 KG/M2 | WEIGHT: 149.2 LBS | RESPIRATION RATE: 16 BRPM | SYSTOLIC BLOOD PRESSURE: 120 MMHG

## 2024-11-05 DIAGNOSIS — Z79.899 ENCOUNTER FOR LONG-TERM (CURRENT) USE OF MEDICATIONS: ICD-10-CM

## 2024-11-05 DIAGNOSIS — E03.9 HYPOTHYROIDISM, ADULT: ICD-10-CM

## 2024-11-05 DIAGNOSIS — G89.29 CHRONIC RIGHT-SIDED LOW BACK PAIN WITHOUT SCIATICA: ICD-10-CM

## 2024-11-05 DIAGNOSIS — M81.0 AGE-RELATED OSTEOPOROSIS WITHOUT CURRENT PATHOLOGICAL FRACTURE: ICD-10-CM

## 2024-11-05 DIAGNOSIS — L98.9 LESION OF SKIN OF NOSE: ICD-10-CM

## 2024-11-05 DIAGNOSIS — M54.50 CHRONIC RIGHT-SIDED LOW BACK PAIN WITHOUT SCIATICA: ICD-10-CM

## 2024-11-05 DIAGNOSIS — L29.9 ITCHING OF EAR: ICD-10-CM

## 2024-11-05 DIAGNOSIS — E03.9 HYPOTHYROIDISM, ADULT: Primary | ICD-10-CM

## 2024-11-05 DIAGNOSIS — Z23 NEEDS FLU SHOT: ICD-10-CM

## 2024-11-05 DIAGNOSIS — F32.A DEPRESSION, UNSPECIFIED DEPRESSION TYPE: ICD-10-CM

## 2024-11-05 LAB
ALBUMIN SERPL-MCNC: 4.1 G/DL (ref 3.5–5)
ALBUMIN/GLOB SERPL: 1.5 (ref 1.1–2.2)
ALP SERPL-CCNC: 57 U/L (ref 45–117)
ALT SERPL-CCNC: 22 U/L (ref 12–78)
ANION GAP SERPL CALC-SCNC: 3 MMOL/L (ref 2–12)
AST SERPL-CCNC: 27 U/L (ref 15–37)
BASOPHILS # BLD: 0 K/UL (ref 0–0.1)
BASOPHILS NFR BLD: 1 % (ref 0–1)
BILIRUB SERPL-MCNC: 0.3 MG/DL (ref 0.2–1)
BUN SERPL-MCNC: 19 MG/DL (ref 6–20)
BUN/CREAT SERPL: 23 (ref 12–20)
CALCIUM SERPL-MCNC: 9.5 MG/DL (ref 8.5–10.1)
CHLORIDE SERPL-SCNC: 107 MMOL/L (ref 97–108)
CHOLEST SERPL-MCNC: 253 MG/DL
CO2 SERPL-SCNC: 28 MMOL/L (ref 21–32)
CREAT SERPL-MCNC: 0.81 MG/DL (ref 0.55–1.02)
DIFFERENTIAL METHOD BLD: ABNORMAL
EOSINOPHIL # BLD: 0.1 K/UL (ref 0–0.4)
EOSINOPHIL NFR BLD: 2 % (ref 0–7)
ERYTHROCYTE [DISTWIDTH] IN BLOOD BY AUTOMATED COUNT: 15.4 % (ref 11.5–14.5)
GLOBULIN SER CALC-MCNC: 2.7 G/DL (ref 2–4)
GLUCOSE SERPL-MCNC: 97 MG/DL (ref 65–100)
HCT VFR BLD AUTO: 42.4 % (ref 35–47)
HDLC SERPL-MCNC: 56 MG/DL
HDLC SERPL: 4.5 (ref 0–5)
HGB BLD-MCNC: 13.7 G/DL (ref 11.5–16)
IMM GRANULOCYTES # BLD AUTO: 0 K/UL (ref 0–0.04)
IMM GRANULOCYTES NFR BLD AUTO: 0 % (ref 0–0.5)
LDLC SERPL CALC-MCNC: 140.2 MG/DL (ref 0–100)
LYMPHOCYTES # BLD: 1.7 K/UL (ref 0.8–3.5)
LYMPHOCYTES NFR BLD: 33 % (ref 12–49)
MCH RBC QN AUTO: 29.9 PG (ref 26–34)
MCHC RBC AUTO-ENTMCNC: 32.3 G/DL (ref 30–36.5)
MCV RBC AUTO: 92.6 FL (ref 80–99)
MONOCYTES # BLD: 0.4 K/UL (ref 0–1)
MONOCYTES NFR BLD: 8 % (ref 5–13)
NEUTS SEG # BLD: 2.8 K/UL (ref 1.8–8)
NEUTS SEG NFR BLD: 56 % (ref 32–75)
NRBC # BLD: 0 K/UL (ref 0–0.01)
NRBC BLD-RTO: 0 PER 100 WBC
PLATELET # BLD AUTO: 228 K/UL (ref 150–400)
PMV BLD AUTO: 11.6 FL (ref 8.9–12.9)
POTASSIUM SERPL-SCNC: 5 MMOL/L (ref 3.5–5.1)
PROT SERPL-MCNC: 6.8 G/DL (ref 6.4–8.2)
RBC # BLD AUTO: 4.58 M/UL (ref 3.8–5.2)
SODIUM SERPL-SCNC: 138 MMOL/L (ref 136–145)
T4 FREE SERPL-MCNC: 1 NG/DL (ref 0.8–1.5)
TRIGL SERPL-MCNC: 284 MG/DL
TSH SERPL DL<=0.05 MIU/L-ACNC: 2.79 UIU/ML (ref 0.36–3.74)
VLDLC SERPL CALC-MCNC: 56.8 MG/DL
WBC # BLD AUTO: 5.1 K/UL (ref 3.6–11)

## 2024-11-05 PROCEDURE — 90653 IIV ADJUVANT VACCINE IM: CPT | Performed by: INTERNAL MEDICINE

## 2024-11-05 PROCEDURE — 99215 OFFICE O/P EST HI 40 MIN: CPT | Performed by: INTERNAL MEDICINE

## 2024-11-05 RX ORDER — FLUOCINOLONE ACETONIDE 0.11 MG/ML
1 OIL AURICULAR (OTIC) 2 TIMES DAILY
Qty: 20 ML | Refills: 0 | Status: SHIPPED | OUTPATIENT
Start: 2024-11-05

## 2024-11-05 NOTE — PROGRESS NOTES
No chief complaint on file.    eviewed record in preparation for visit and have obtained necessary documentation.    Identified pt with two pt identifiers(name and ).      Health Maintenance Due   Topic    Respiratory Syncytial Virus (RSV) Pregnant or age 60 yrs+ (1 - 1-dose 60+ series)    Flu vaccine (1)    COVID-19 Vaccine ( season)         No chief complaint on file.       Wt Readings from Last 3 Encounters:   24 67.7 kg (149 lb 3.2 oz)   24 67.4 kg (148 lb 9.6 oz)   10/04/23 66.7 kg (147 lb)     Temp Readings from Last 3 Encounters:   24 97.5 °F (36.4 °C) (Temporal)   04/15/24 98.8 °F (37.1 °C) (Oral)   24 97.2 °F (36.2 °C) (Temporal)     BP Readings from Last 3 Encounters:   24 120/80   04/15/24 131/84   24 134/86     Pulse Readings from Last 3 Encounters:   24 72   04/15/24 68   24 58           Learning Assessment:  :    Failed to redirect to the Timeline version of the REVFS SmartLink.    Depression Screening:  :         No data to display                Fall Risk Assessment:  :    Failed to redirect to the Timeline version of the REVFS SmartLink.    Abuse Screening:  :         No data to display               
status for shingles and RSV.              Objective:     Wt Readings from Last 3 Encounters:   11/05/24 67.7 kg (149 lb 3.2 oz)   04/11/24 67.4 kg (148 lb 9.6 oz)   10/04/23 66.7 kg (147 lb)     BP Readings from Last 3 Encounters:   11/05/24 120/80   04/15/24 131/84   04/11/24 134/86     /80   Pulse 72   Temp 97.5 °F (36.4 °C) (Temporal)   Resp 16   Ht 1.575 m (5' 2\")   Wt 67.7 kg (149 lb 3.2 oz)   SpO2 96%   BMI 27.29 kg/m²   General appearance: alert, appears stated age, and cooperative  Head: Normocephalic, without obvious abnormality, atraumatic  Neck: no adenopathy, no carotid bruit, no JVD, supple, symmetrical, trachea midline, and thyroid not enlarged, symmetric, no tenderness/mass/nodules  Lungs: clear to auscultation bilaterally  Heart: regular rate and rhythm, S1, S2 normal, no murmur, click, rub or gallop  Abdomen: soft, non-tender; bowel sounds normal; no masses,  no organomegaly  Extremities:no edema  Pulses: 2+ and symmetric  Skin:  warm and dry.  Small 1/2 cm round, slightly ulcerated lesion on the right lateral edge of her nose. No exudate or erythema            The patient (or guardian, if applicable) and other individuals in attendance with the patient were advised that Artificial Intelligence will be utilized during this visit to record and process the conversation to generate a clinical note. The patient (or guardian, if applicable) and other individuals in attendance at the appointment consented to the use of AI, including the recording.

## 2024-12-04 ENCOUNTER — HOSPITAL ENCOUNTER (OUTPATIENT)
Age: 79
Discharge: HOME | End: 2024-12-07
Payer: MEDICARE

## 2024-12-04 DIAGNOSIS — M54.16 RADICULOPATHY OF LUMBAR REGION: ICD-10-CM

## 2024-12-04 PROCEDURE — 72148 MRI LUMBAR SPINE W/O DYE: CPT

## 2024-12-16 RX ORDER — ESCITALOPRAM OXALATE 10 MG/1
TABLET ORAL
Qty: 90 TABLET | Refills: 0 | Status: SHIPPED | OUTPATIENT
Start: 2024-12-16

## 2024-12-16 RX ORDER — LEVOTHYROXINE SODIUM 50 UG/1
TABLET ORAL
Qty: 108 TABLET | Refills: 0 | Status: SHIPPED | OUTPATIENT
Start: 2024-12-16

## 2024-12-16 NOTE — TELEPHONE ENCOUNTER
Last office visit 11/05/24  NEXT APPT  With Internal Medicine (Nichole Crenshaw MD)  05/08/2025 at 11:20 AM        Last fill on lexapro 9/23/24 #90 with no additional refills    Last fill on synthroid 9/23/24 #108 with no additional refills

## 2025-01-09 ENCOUNTER — TELEPHONE (OUTPATIENT)
Age: 80
End: 2025-01-09

## 2025-01-09 NOTE — TELEPHONE ENCOUNTER
Patient provided with viis records and reports that she has made an appointment with travel clinic

## 2025-01-09 NOTE — TELEPHONE ENCOUNTER
Placed call to pt. Two pt identifiers confirmed.  Pt informed that she needs to make an appointment with a travel clinic to for travel vaccinations.     Provided patient with information below:    [facility]  [facility]  [place] [address] [phone]     Pt will be coming to the office to  a copy of her Vaccine record from [place] [employer] [facility].

## 2025-01-09 NOTE — TELEPHONE ENCOUNTER
Reason for call: TC from patient. Patient will be traveling to [facility] and will be needing shots. Patient would like to discuss the shots that are needed and if  can give her the shots.     Is this a new problem: Yes    Date of last appointment:  11/5/2024     Can we respond via [employer]: No    Best call back number:      [phone] (Mobile)

## 2025-01-23 ENCOUNTER — HOSPITAL ENCOUNTER (OUTPATIENT)
Facility: HOSPITAL | Age: 80
Discharge: HOME | End: 2025-01-26
Attending: INTERNAL MEDICINE
Payer: MEDICARE

## 2025-01-23 VITALS — HEIGHT: 64 IN | BODY MASS INDEX: 24.24 KG/M2 | WEIGHT: 142 LBS

## 2025-01-23 DIAGNOSIS — Z12.31 VISIT FOR SCREENING MAMMOGRAM: ICD-10-CM

## 2025-01-23 PROCEDURE — 77063 BREAST TOMOSYNTHESIS BI: CPT

## 2025-01-27 ENCOUNTER — TELEPHONE (OUTPATIENT)
Age: 80
End: 2025-01-27

## 2025-01-27 NOTE — TELEPHONE ENCOUNTER
Patient is going out of the country in Feb and needs to get a DPT shot -- wants to get it at her pharmacy, but they need an order sent.  Could you please send that to [facility] on [address].    Also, she is fairly certain she had the Hep B series back in the 90's, but the Health Dept's records do not go back that far.  Patient is asking if Dr. Crenshaw would order a lab test for a titer for that.    Please let patient know when these 2 things have been ordered.    [phone]

## 2025-01-28 ENCOUNTER — TELEPHONE (OUTPATIENT)
Age: 80
End: 2025-01-28

## 2025-01-28 DIAGNOSIS — Z01.84 IMMUNITY STATUS TESTING: ICD-10-CM

## 2025-01-28 DIAGNOSIS — Z01.84 IMMUNITY STATUS TESTING: Primary | ICD-10-CM

## 2025-01-28 NOTE — TELEPHONE ENCOUNTER
Reason for call:  returning a call to [facility] please call back    Is this a new problem: Yes    Date of last appointment:  11/5/2024     Can we respond via [employer]: No    Best call back number:     Annemarie Perdomo (Self) [phone] (Mobile)

## 2025-01-28 NOTE — TELEPHONE ENCOUNTER
1/28/2025 spoke with patient. She is traveling to [place].    She went to [facility] - and was advised to get Hepatitis B vaccine, but she thinks she has had it done in the past.  Would like a titer drawn.    She received her tdap booster today.     Also notified that when test was ordered, noted it was not covered by her insurance.     She can discuss cost of testing with [facility].    Orders Placed This Encounter    Hepatitis B Surface Antibody     Standing Status:   Future     Standing Expiration Date:   1/28/2026

## 2025-02-06 ENCOUNTER — TELEPHONE (OUTPATIENT)
Age: 80
End: 2025-02-06

## 2025-02-06 NOTE — TELEPHONE ENCOUNTER
Reason for call:  TC from pt. Pt id verified by two identifiers. Pt states she needs to speak with nurse re travel shots and travel prescriptions. Pt states Dr. Crenshaw knows about her travel plans.     Is this a new problem: Yes    Date of last appointment:  11/5/2024     Can we respond via [employer]: No - pt would like to speak with nurse, please.     Best call back number: [phone]

## 2025-02-07 NOTE — TELEPHONE ENCOUNTER
Called patient to discuss hepatitis B titer report. Titer shows she is not immune to hepatitis B, she is concerned due to an upcoming trip to [place] next month.   Advised patient that she is at relatively low risk for hepatitis B as it is primarily spread through sexual contact or needle-sharing. If she desires she can start the hepatitis B vaccine series now but would not be fully immunized for six months which would not be in time for her trip.   She voiced understanding and says that she does not feel she needs the vaccine to travel.     She also inquires about refills of her medications for her upcoming trip as well as prescriptions for antibiotic medications in case she gets sick while travelling. Advised her to direct these questions to her PCP.

## 2025-02-26 ENCOUNTER — TELEPHONE (OUTPATIENT)
Age: 80
End: 2025-02-26

## 2025-02-26 NOTE — TELEPHONE ENCOUNTER
Attempted to return call.  Per provider RSV vaccine is recommended.  It will take about 2 weeks to become effective.  Left message on  secure vm as instructed by patient.

## 2025-02-26 NOTE — TELEPHONE ENCOUNTER
Reason for call:  Spoke with pt. Pt will be going on a trip. She would like to know  if she should get RSV vaccine.   Please call pt she do not do My chart.    Is this a new problem: Yes    Date of last appointment:  11/5/2024     Can we respond via [employer]: No    Best call back number: ConorAnnemarie   [phone]

## 2025-02-26 NOTE — TELEPHONE ENCOUNTER
Medication Refill Request    Annemarie Perdomo is requesting a refill of the following medication(s):   escitalopram (LEXAPRO) 10 MG tablet   levothyroxine (SYNTHROID) 50 MCG tablet     Please send refill to:   [facility] [place] [address] [facility] [phone] - F [phone] [address] [facility] [address]  Phone: [phone] Fax: [phone]

## 2025-02-27 RX ORDER — LEVOTHYROXINE SODIUM 50 UG/1
TABLET ORAL
Qty: 108 TABLET | Refills: 0 | Status: SHIPPED | OUTPATIENT
Start: 2025-02-27

## 2025-02-27 RX ORDER — ESCITALOPRAM OXALATE 10 MG/1
TABLET ORAL
Qty: 90 TABLET | Refills: 0 | Status: SHIPPED | OUTPATIENT
Start: 2025-02-27

## 2025-02-27 NOTE — TELEPHONE ENCOUNTER
Rx sent to pharmacy as previously filled and verified by Verbal Order Read Back with provider.    NV 05/08/2025

## 2025-02-28 RX ORDER — ESCITALOPRAM OXALATE 10 MG/1
TABLET ORAL
Qty: 90 TABLET | Refills: 0 | OUTPATIENT
Start: 2025-02-28

## 2025-03-03 RX ORDER — ESCITALOPRAM OXALATE 10 MG/1
TABLET ORAL
Qty: 90 TABLET | Refills: 0 | OUTPATIENT
Start: 2025-03-03

## 2025-03-04 ENCOUNTER — TELEPHONE (OUTPATIENT)
Age: 80
End: 2025-03-04

## 2025-03-04 NOTE — TELEPHONE ENCOUNTER
Medication Refill Request    Annemarie Perdomo is requesting a refill of the following medication(s):   Synthroid   Pt states that she will be going out of town on 3/5 and will not return until 4/10. She will run out of pills while out of town. Patient is requesting an additional 10-12 pills to cover her while on vacation.     Please send refill to:      [facility] [address] [facility] [phone] - F [phone] [address] [facility] [address]  Phone: [phone] Fax: [phone]

## 2025-03-05 RX ORDER — ESCITALOPRAM OXALATE 10 MG/1
TABLET ORAL
Qty: 90 TABLET | Refills: 0 | OUTPATIENT
Start: 2025-03-05

## 2025-03-06 ENCOUNTER — TELEPHONE (OUTPATIENT)
Age: 80
End: 2025-03-06

## 2025-03-06 NOTE — TELEPHONE ENCOUNTER
Patient called the office reporting that pharmacy  doesn't have the lexapro refill e-scribed  Called pharmacy and gave verbal order.   Patient advised verbal order has been given

## 2025-03-12 ENCOUNTER — OFFICE VISIT (OUTPATIENT)
Age: 80
End: 2025-03-12
Payer: MEDICARE

## 2025-03-12 VITALS
WEIGHT: 146.6 LBS | DIASTOLIC BLOOD PRESSURE: 67 MMHG | SYSTOLIC BLOOD PRESSURE: 145 MMHG | RESPIRATION RATE: 18 BRPM | BODY MASS INDEX: 25.03 KG/M2 | HEART RATE: 63 BPM | OXYGEN SATURATION: 97 % | TEMPERATURE: 97.5 F | HEIGHT: 64 IN

## 2025-03-12 DIAGNOSIS — H01.119 EYELID DERMATITIS, ALLERGIC/CONTACT: Primary | ICD-10-CM

## 2025-03-12 PROCEDURE — G8399 PT W/DXA RESULTS DOCUMENT: HCPCS

## 2025-03-12 PROCEDURE — 1036F TOBACCO NON-USER: CPT

## 2025-03-12 PROCEDURE — 1125F AMNT PAIN NOTED PAIN PRSNT: CPT

## 2025-03-12 PROCEDURE — G8419 CALC BMI OUT NRM PARAM NOF/U: HCPCS

## 2025-03-12 PROCEDURE — 1090F PRES/ABSN URINE INCON ASSESS: CPT

## 2025-03-12 PROCEDURE — 99213 OFFICE O/P EST LOW 20 MIN: CPT

## 2025-03-12 PROCEDURE — 1159F MED LIST DOCD IN RCRD: CPT

## 2025-03-12 PROCEDURE — 1123F ACP DISCUSS/DSCN MKR DOCD: CPT

## 2025-03-12 PROCEDURE — G8427 DOCREV CUR MEDS BY ELIG CLIN: HCPCS

## 2025-03-12 RX ORDER — METHYLPREDNISOLONE 4 MG/1
TABLET ORAL
Qty: 21 TABLET | Refills: 0 | Status: SHIPPED | OUTPATIENT
Start: 2025-03-12 | End: 2025-03-18

## 2025-03-12 RX ORDER — DESONIDE 0.5 MG/G
CREAM TOPICAL
Qty: 60 G | Refills: 0 | Status: SHIPPED | OUTPATIENT
Start: 2025-03-12

## 2025-03-12 SDOH — ECONOMIC STABILITY: FOOD INSECURITY: WITHIN THE PAST 12 MONTHS, YOU WORRIED THAT YOUR FOOD WOULD RUN OUT BEFORE YOU GOT MONEY TO BUY MORE.: NEVER TRUE

## 2025-03-12 SDOH — ECONOMIC STABILITY: FOOD INSECURITY: WITHIN THE PAST 12 MONTHS, THE FOOD YOU BOUGHT JUST DIDN'T LAST AND YOU DIDN'T HAVE MONEY TO GET MORE.: NEVER TRUE

## 2025-03-12 ASSESSMENT — PATIENT HEALTH QUESTIONNAIRE - PHQ9
5. POOR APPETITE OR OVEREATING: NOT AT ALL
8. MOVING OR SPEAKING SO SLOWLY THAT OTHER PEOPLE COULD HAVE NOTICED. OR THE OPPOSITE, BEING SO FIGETY OR RESTLESS THAT YOU HAVE BEEN MOVING AROUND A LOT MORE THAN USUAL: NOT AT ALL
SUM OF ALL RESPONSES TO PHQ QUESTIONS 1-9: 0
7. TROUBLE CONCENTRATING ON THINGS, SUCH AS READING THE NEWSPAPER OR WATCHING TELEVISION: NOT AT ALL
SUM OF ALL RESPONSES TO PHQ QUESTIONS 1-9: 0
3. TROUBLE FALLING OR STAYING ASLEEP: NOT AT ALL
10. IF YOU CHECKED OFF ANY PROBLEMS, HOW DIFFICULT HAVE THESE PROBLEMS MADE IT FOR YOU TO DO YOUR WORK, TAKE CARE OF THINGS AT HOME, OR GET ALONG WITH OTHER PEOPLE: NOT DIFFICULT AT ALL
4. FEELING TIRED OR HAVING LITTLE ENERGY: NOT AT ALL
6. FEELING BAD ABOUT YOURSELF - OR THAT YOU ARE A FAILURE OR HAVE LET YOURSELF OR YOUR FAMILY DOWN: NOT AT ALL
1. LITTLE INTEREST OR PLEASURE IN DOING THINGS: NOT AT ALL
SUM OF ALL RESPONSES TO PHQ QUESTIONS 1-9: 0
9. THOUGHTS THAT YOU WOULD BE BETTER OFF DEAD, OR OF HURTING YOURSELF: NOT AT ALL
SUM OF ALL RESPONSES TO PHQ QUESTIONS 1-9: 0
2. FEELING DOWN, DEPRESSED OR HOPELESS: NOT AT ALL

## 2025-03-12 ASSESSMENT — ENCOUNTER SYMPTOMS
EYE PAIN: 0
EYE DISCHARGE: 0
EYE ITCHING: 0
RHINORRHEA: 0
SINUS PRESSURE: 0
PHOTOPHOBIA: 0
EYE REDNESS: 0

## 2025-03-12 NOTE — PATIENT INSTRUCTIONS
Apply Desonide cream (low potency steroid) to upper eyelids twice daily for one week.    If persistent despite this, take Methylprednisolone taper as directed on blister pack for 6 days.

## 2025-03-12 NOTE — PROGRESS NOTES
Annemarie Perdomo is a 79 y.o. female who was seen in clinic today (3/12/2025) for an acute visit.      Assessment & Plan:   Below is the assessment and plan developed based on review of pertinent history, physical exam, labs, studies, and medications.    Assessment & Plan  Eyelid dermatitis, allergic/contact   Acute condition, new,  Symptoms of eyelid erythema and itching without eye pain, drainage, vision changes, nor foreign body sensation, and normal visual examination of eyes themselves. Agree with Dermatology that this is likely atopic dermatitis. Since did not tolerate topical Tacrolimus prescribed by Derm, provided BID low potency steroid to use BID for one week, advised not to use beyond 2 weeks. If symptoms persist while out of country after one week, provided Medrol dose pack.     Orders:    desonide (DESOWEN) 0.05 % cream; Apply topically to upper eyelids 2 times daily for up to one week.    methylPREDNISolone (MEDROL DOSEPACK) 4 MG tablet; Take by mouth as directed      Subjective/Objective:   Annemarie was seen today for Eye Problem (Onset; 03/11/2025, swollen, eye red, itching, patient treated eye with some kind of skin cream around the eye and washed it off, because it just made worse./Today denies drainage, itching, burning, red, patient denies being around anyone with conjunctivitis, pink eyes and has no seasonal allergies. )    Onset of bilateral upper eyelid erythema, edema, and itching yesterday morning. Notes having reactions to her eye makeup recently. No recent sick contacts. No vision changes. No pain, drainage, scleral injection, nor foreign body sensation. Sent picture to her Dermatologist who felt that she had atopic dermatitis of the upper eyelids and sent rx for topical Tacrolimus. After trying this, she felt that redness/itching got worse. Presents to day as she is leaving the country tomorrow. NO fevers, chills, URI symptoms.    Prior to Admission medications    Medication Sig Start Date

## 2025-05-06 ENCOUNTER — TELEPHONE (OUTPATIENT)
Age: 80
End: 2025-05-06

## 2025-05-06 NOTE — TELEPHONE ENCOUNTER
Attempted to contact patient regarding upcoming Medicare wellness appointment and completion of HRA questionnaire. LVM for patient to please return call at  [phone], mcm sent as well.

## 2025-05-13 ENCOUNTER — TELEPHONE (OUTPATIENT)
Age: 80
End: 2025-05-13

## 2025-05-13 SDOH — HEALTH STABILITY: PHYSICAL HEALTH: ON AVERAGE, HOW MANY DAYS PER WEEK DO YOU ENGAGE IN MODERATE TO STRENUOUS EXERCISE (LIKE A BRISK WALK)?: 7 DAYS

## 2025-05-13 SDOH — HEALTH STABILITY: PHYSICAL HEALTH: ON AVERAGE, HOW MANY MINUTES DO YOU ENGAGE IN EXERCISE AT THIS LEVEL?: 60 MIN

## 2025-05-13 ASSESSMENT — LIFESTYLE VARIABLES
HOW MANY STANDARD DRINKS CONTAINING ALCOHOL DO YOU HAVE ON A TYPICAL DAY: 1
HOW MANY STANDARD DRINKS CONTAINING ALCOHOL DO YOU HAVE ON A TYPICAL DAY: 1 OR 2
HOW OFTEN DO YOU HAVE SIX OR MORE DRINKS ON ONE OCCASION: 1
HOW OFTEN DO YOU HAVE A DRINK CONTAINING ALCOHOL: 2-4 TIMES A MONTH
HOW OFTEN DO YOU HAVE A DRINK CONTAINING ALCOHOL: 3

## 2025-05-13 ASSESSMENT — PATIENT HEALTH QUESTIONNAIRE - PHQ9
1. LITTLE INTEREST OR PLEASURE IN DOING THINGS: NOT AT ALL
SUM OF ALL RESPONSES TO PHQ QUESTIONS 1-9: 0
2. FEELING DOWN, DEPRESSED OR HOPELESS: NOT AT ALL

## 2025-05-14 NOTE — PROGRESS NOTES
Medicare Annual Wellness Visit    Annemarie Perdomo is here for Medicare AWV    Assessment & Plan  1. Medicare annual wellness visit, subsequent  -up to date with breast cancer and colon cancer screening  -recommended RSV vaccine.     Also, she has additional complaints of the following --.     2. Hypothyroidism, adult  -appears clinically euthyroid  -taking levothyroxine 50mg daily 5 days per week and 2 tablets twice weekly.   .   - TSH; Future  - T4, Free; Future    3. Age-related osteoporosis without current pathological fracture  -she stopped taking fosamax because her friends 'told her bad things' about use of this medication.  -discussed treatments for osteoporosis.  She does desire treatments.  She would like to try use of monthy bisphosphonate.  She is aware that this has the same side effect profile as fosamax. She states understanding.  She did not have any adverse reaction to fosamax, but found it difficult to take weekly and spacing it with her levothyroxine.   -recommended boniva 150mg monthly.  She is in agreement.   -will check DXA at this time.     - DEXA BONE DENSITY AXIAL SKELETON; Future    4. Depression, unspecified depression type  -mood is well controlled with use of lexapro 10mg daily.  -no adjustment needed    5. Pure hypercholesterolemia  -calcium score of 0 noted with heart CT done 3/9/2022  -encouraged maintaining diet low in fats and cholesterol    - Comprehensive Metabolic Panel; Future    6. Encounter for long-term (current) use of medications    7. Lumbar stenosis  -has consulted with Dr. Bradford, pain management.   -last MRI done 12/2024.  -encouraged doing daily stretches for back.      Orders Placed This Encounter    DEXA BONE DENSITY AXIAL SKELETON     Standing Status:   Future     Expected Date:   5/16/2025     Expiration Date:   5/16/2026     Reason for exam::   osteoporosis    Comprehensive Metabolic Panel     Standing Status:   Future     Number of Occurrences:   1     Expected

## 2025-05-16 ENCOUNTER — OFFICE VISIT (OUTPATIENT)
Age: 80
End: 2025-05-16
Payer: MEDICARE

## 2025-05-16 VITALS
HEART RATE: 68 BPM | HEIGHT: 63 IN | SYSTOLIC BLOOD PRESSURE: 122 MMHG | WEIGHT: 147.6 LBS | DIASTOLIC BLOOD PRESSURE: 70 MMHG | BODY MASS INDEX: 26.15 KG/M2 | OXYGEN SATURATION: 98 % | RESPIRATION RATE: 16 BRPM | TEMPERATURE: 97.5 F

## 2025-05-16 DIAGNOSIS — F32.A DEPRESSION, UNSPECIFIED DEPRESSION TYPE: ICD-10-CM

## 2025-05-16 DIAGNOSIS — Z00.00 MEDICARE ANNUAL WELLNESS VISIT, SUBSEQUENT: Primary | ICD-10-CM

## 2025-05-16 DIAGNOSIS — E03.9 HYPOTHYROIDISM, ADULT: ICD-10-CM

## 2025-05-16 DIAGNOSIS — M81.0 AGE-RELATED OSTEOPOROSIS WITHOUT CURRENT PATHOLOGICAL FRACTURE: ICD-10-CM

## 2025-05-16 DIAGNOSIS — E78.00 PURE HYPERCHOLESTEROLEMIA: ICD-10-CM

## 2025-05-16 DIAGNOSIS — M48.061 SPINAL STENOSIS OF LUMBAR REGION, UNSPECIFIED WHETHER NEUROGENIC CLAUDICATION PRESENT: ICD-10-CM

## 2025-05-16 DIAGNOSIS — Z79.899 ENCOUNTER FOR LONG-TERM (CURRENT) USE OF MEDICATIONS: ICD-10-CM

## 2025-05-16 LAB
ALBUMIN SERPL-MCNC: 4.1 G/DL (ref 3.5–5)
ALBUMIN/GLOB SERPL: 1.5 (ref 1.1–2.2)
ALP SERPL-CCNC: 60 U/L (ref 45–117)
ALT SERPL-CCNC: 19 U/L (ref 12–78)
ANION GAP SERPL CALC-SCNC: 4 MMOL/L (ref 2–12)
AST SERPL-CCNC: 23 U/L (ref 15–37)
BILIRUB SERPL-MCNC: 0.5 MG/DL (ref 0.2–1)
BUN SERPL-MCNC: 19 MG/DL (ref 6–20)
BUN/CREAT SERPL: 25 (ref 12–20)
CALCIUM SERPL-MCNC: 9.1 MG/DL (ref 8.5–10.1)
CHLORIDE SERPL-SCNC: 107 MMOL/L (ref 97–108)
CO2 SERPL-SCNC: 27 MMOL/L (ref 21–32)
CREAT SERPL-MCNC: 0.77 MG/DL (ref 0.55–1.02)
GLOBULIN SER CALC-MCNC: 2.7 G/DL (ref 2–4)
GLUCOSE SERPL-MCNC: 99 MG/DL (ref 65–100)
POTASSIUM SERPL-SCNC: 4.6 MMOL/L (ref 3.5–5.1)
PROT SERPL-MCNC: 6.8 G/DL (ref 6.4–8.2)
SODIUM SERPL-SCNC: 138 MMOL/L (ref 136–145)
T4 FREE SERPL-MCNC: 1 NG/DL (ref 0.8–1.5)
TSH SERPL DL<=0.05 MIU/L-ACNC: 2.27 UIU/ML (ref 0.36–3.74)

## 2025-05-16 PROCEDURE — 99214 OFFICE O/P EST MOD 30 MIN: CPT | Performed by: INTERNAL MEDICINE

## 2025-05-16 RX ORDER — IBANDRONATE SODIUM 150 MG/1
150 TABLET, FILM COATED ORAL
Qty: 3 TABLET | Refills: 3 | Status: SHIPPED | OUTPATIENT
Start: 2025-05-16

## 2025-05-16 RX ORDER — ESCITALOPRAM OXALATE 10 MG/1
10 TABLET ORAL DAILY
Qty: 90 TABLET | Refills: 0 | Status: SHIPPED | OUTPATIENT
Start: 2025-05-16

## 2025-05-16 NOTE — PROGRESS NOTES
Chief Complaint   Patient presents with    Medicare AWV     eviewed record in preparation for visit and have obtained necessary documentation.    Identified pt with two pt identifiers(name and ).      Health Maintenance Due   Topic    Respiratory Syncytial Virus (RSV) Pregnant or age 60 yrs+ (1 - 1-dose 75+ series)    COVID-19 Vaccine ( season)    Annual Wellness Visit (Medicare)          Chief Complaint   Patient presents with    Medicare AWV        Wt Readings from Last 3 Encounters:   25 67 kg (147 lb 9.6 oz)   25 66.5 kg (146 lb 9.6 oz)   25 64.4 kg (142 lb)     Temp Readings from Last 3 Encounters:   25 97.5 °F (36.4 °C) (Temporal)   25 97.5 °F (36.4 °C) (Oral)   24 97.5 °F (36.4 °C) (Temporal)     BP Readings from Last 3 Encounters:   25 122/70   25 (!) 145/67   24 120/80     Pulse Readings from Last 3 Encounters:   25 68   25 63   24 72           Learning Assessment:  :    Failed to redirect to the Timeline version of the REVFS SmartLink.    Depression Screening:  :         No data to display                Fall Risk Assessment:  :    Failed to redirect to the Timeline version of the REVFS SmartLink.    Abuse Screening:  :         No data to display

## 2025-05-18 ENCOUNTER — RESULTS FOLLOW-UP (OUTPATIENT)
Age: 80
End: 2025-05-18

## 2025-05-18 DIAGNOSIS — Z92.89 H/O BONE DENSITY STUDY: Primary | Chronic | ICD-10-CM

## 2025-05-29 ENCOUNTER — TELEPHONE (OUTPATIENT)
Age: 80
End: 2025-05-29

## 2025-05-29 NOTE — TELEPHONE ENCOUNTER
Reason for call:  Spoke with pt. Pt requested a call back from PCP. Is related to and depression and lab work. Make elsy on Pedro 3 at 10 am. Pt still want a call from PCP     Is this a new problem: Yes    Date of last appointment:  5/16/2025     Can we respond via [employer]: No    Best call back number: Annemarie Perdomo   [phone]

## 2025-05-29 NOTE — TELEPHONE ENCOUNTER
Spoke with pt - states for past 2 weeks has felt fatigued and very depressed. She states she has hx of depression but this feeling is different. She feels it \"chemical depression.\" Asked her has she started any new medications? The only new med is Boniva. She does take Lexapro but has been on this. She states she usually has a lot of energy and wants to do things. Now she just wants to sleep. Pt states she is not harmful to herself or others at this time. Pt is schedule with Dr Crenshaw on 6/3/25. Will forward to MD.

## 2025-06-03 ENCOUNTER — OFFICE VISIT (OUTPATIENT)
Age: 80
End: 2025-06-03
Payer: MEDICARE

## 2025-06-03 VITALS
OXYGEN SATURATION: 96 % | HEIGHT: 63 IN | BODY MASS INDEX: 26.51 KG/M2 | DIASTOLIC BLOOD PRESSURE: 80 MMHG | WEIGHT: 149.6 LBS | RESPIRATION RATE: 16 BRPM | TEMPERATURE: 97.5 F | HEART RATE: 68 BPM | SYSTOLIC BLOOD PRESSURE: 130 MMHG

## 2025-06-03 DIAGNOSIS — E03.9 HYPOTHYROIDISM, ADULT: ICD-10-CM

## 2025-06-03 DIAGNOSIS — M81.0 AGE-RELATED OSTEOPOROSIS WITHOUT CURRENT PATHOLOGICAL FRACTURE: ICD-10-CM

## 2025-06-03 DIAGNOSIS — F32.A DEPRESSION, UNSPECIFIED DEPRESSION TYPE: Primary | ICD-10-CM

## 2025-06-03 PROCEDURE — 99215 OFFICE O/P EST HI 40 MIN: CPT | Performed by: INTERNAL MEDICINE

## 2025-06-03 PROCEDURE — 1159F MED LIST DOCD IN RCRD: CPT | Performed by: INTERNAL MEDICINE

## 2025-06-03 PROCEDURE — 1090F PRES/ABSN URINE INCON ASSESS: CPT | Performed by: INTERNAL MEDICINE

## 2025-06-03 PROCEDURE — 1036F TOBACCO NON-USER: CPT | Performed by: INTERNAL MEDICINE

## 2025-06-03 PROCEDURE — G8419 CALC BMI OUT NRM PARAM NOF/U: HCPCS | Performed by: INTERNAL MEDICINE

## 2025-06-03 PROCEDURE — 1123F ACP DISCUSS/DSCN MKR DOCD: CPT | Performed by: INTERNAL MEDICINE

## 2025-06-03 PROCEDURE — G8427 DOCREV CUR MEDS BY ELIG CLIN: HCPCS | Performed by: INTERNAL MEDICINE

## 2025-06-03 PROCEDURE — G8399 PT W/DXA RESULTS DOCUMENT: HCPCS | Performed by: INTERNAL MEDICINE

## 2025-06-03 PROCEDURE — 1126F AMNT PAIN NOTED NONE PRSNT: CPT | Performed by: INTERNAL MEDICINE

## 2025-06-03 RX ORDER — LEVOTHYROXINE SODIUM 50 UG/1
TABLET ORAL
Qty: 108 TABLET | Refills: 1 | Status: SHIPPED | OUTPATIENT
Start: 2025-06-03

## 2025-06-03 RX ORDER — LEVOTHYROXINE SODIUM 50 UG/1
TABLET ORAL
Qty: 108 TABLET | Refills: 0 | OUTPATIENT
Start: 2025-06-03

## 2025-06-03 NOTE — PROGRESS NOTES
Annemarie Perdomo is a 79 y.o. female Established patient who presents today for evaluation of the following -         Assessment and Plan:    1. Depression, unspecified depression type  -reports feeling better at this time.  The increased fatigue she associated with depression lasted about 1 week.  Denied any suicidal or homicidal ideations.  Just felt increased fatigue requiring naps, which is unusual for her.  Discussed with patient that her symptoms may have also potentially been caused by a virus.  She notes that it occurred shortly after she took her boniva medication which was new to her, thus could have been also a drug   -will not make any adjustments to her escitalopram dose at this time.      2. Age-related osteoporosis without current pathological fracture  -possible reaction to use of Boniva.    -she would like to continue with use for now, she has used fosamax in the past without any complications.  Recommended she switch to fosamax at this time, but she would like to give Boniva another try    3. Hypothyroidism, adult  -will check her thyroid levels due to episode of increased fatigue.   -she is currently taking levothyroxine 50mcg daily.       On this 06/03/2025 I have spent 40 minutes reviewing previous notes, test results and face to face with the patient discussing the diagnosis and importance of compliance with the treatment plan as well as documenting on the day of the visit.            Orders Placed This Encounter    levothyroxine (SYNTHROID) 50 MCG tablet     Sig: TAKE ONE TABLET BY MOUTH DAILY BEFORE BREAKFAST WITH THE EXCEPTION OF 2 DAYS PER WEEK SHE TAKES 2 TABLETS.     Dispense:  108 tablet     Refill:  1                  History of Present Illness  The patient came in today because she's been feeling really depressed. She thinks it's due to a chemical imbalance rather than anything happening in her life. This has been going on for over a week, and during this time, she's been taking long

## 2025-06-03 NOTE — PROGRESS NOTES
Chief Complaint   Patient presents with    Follow-up     eviewed record in preparation for visit and have obtained necessary documentation.    Identified pt with two pt identifiers(name and ).      Health Maintenance Due   Topic    Respiratory Syncytial Virus (RSV) Pregnant or age 60 yrs+ (1 - 1-dose 75+ series)    COVID-19 Vaccine ( season)         Chief Complaint   Patient presents with    Follow-up        Wt Readings from Last 3 Encounters:   25 67.9 kg (149 lb 9.6 oz)   25 67 kg (147 lb 9.6 oz)   25 66.5 kg (146 lb 9.6 oz)     Temp Readings from Last 3 Encounters:   25 97.5 °F (36.4 °C) (Temporal)   25 97.5 °F (36.4 °C) (Temporal)   25 97.5 °F (36.4 °C) (Oral)     BP Readings from Last 3 Encounters:   25 130/80   25 122/70   25 (!) 145/67     Pulse Readings from Last 3 Encounters:   25 68   25 68   25 63           Learning Assessment:  :    Failed to redirect to the Timeline version of the REVFS SmartLink.    Depression Screening:  :         No data to display                Fall Risk Assessment:  :    Failed to redirect to the Timeline version of the REVFS SmartLink.    Abuse Screening:  :         No data to display

## 2025-06-10 ENCOUNTER — HOSPITAL ENCOUNTER (OUTPATIENT)
Facility: HOSPITAL | Age: 80
Discharge: HOME | End: 2025-06-13
Attending: INTERNAL MEDICINE
Payer: MEDICARE

## 2025-06-10 VITALS — BODY MASS INDEX: 26.22 KG/M2 | HEIGHT: 63 IN | WEIGHT: 148 LBS

## 2025-06-10 DIAGNOSIS — M81.0 AGE-RELATED OSTEOPOROSIS WITHOUT CURRENT PATHOLOGICAL FRACTURE: ICD-10-CM

## 2025-06-10 PROCEDURE — 77080 DXA BONE DENSITY AXIAL: CPT

## 2025-07-28 RX ORDER — FLUOCINOLONE ACETONIDE 0.11 MG/ML
OIL AURICULAR (OTIC)
Qty: 20 ML | Refills: 0 | Status: SHIPPED | OUTPATIENT
Start: 2025-07-28

## 2025-07-28 NOTE — TELEPHONE ENCOUNTER
Last office visit 6/3/25  Next Appt  With Internal Medicine (Nichole Crenshaw MD)  11/20/2025 at 11:00 AM    Last fill on flucinolone 11/5/24 #20ml with no additional refills

## (undated) DEVICE — DYONICS 25 INFLOW/OUTFLOW TUBE                                    SET, SINGLE SUCTION, 3 PER BOX

## (undated) DEVICE — SMALL OSC. SAW BLADE, 9MM X 24.6MM X 0.38MM: Brand: MICROAIRE®

## (undated) DEVICE — GOWN,SLEEVE,STERILE,W/CSR WRAP,1/P: Brand: MEDLINE

## (undated) DEVICE — ARTHROSCOPY RICHMOND-LF: Brand: MEDLINE INDUSTRIES, INC.

## (undated) DEVICE — CUFF CRYO 15-23IN CIRC M KNEE COOL PD TB GRAV FLO W/O BD

## (undated) DEVICE — PADDING CST 4INX4YD --

## (undated) DEVICE — INFECTION CONTROL KIT SYS

## (undated) DEVICE — 4-PORT MANIFOLD: Brand: NEPTUNE 2

## (undated) DEVICE — DRAPE,REIN 53X77,STERILE: Brand: MEDLINE

## (undated) DEVICE — BIT DRL L65MM DIA2MM MINI S STL QUIK CPL W/O STP REUSE FOR

## (undated) DEVICE — KIT INSTR TRANSTIBIAL ACL W/ SAW BLDE DISP

## (undated) DEVICE — (D)STRIP SKN CLSR 0.5X4IN WHT --

## (undated) DEVICE — SUTURE MCRYL SZ 3-0 L18IN ABSRB UD L19MM PS-2 3/8 CIR PRIM Y497G

## (undated) DEVICE — IMMOBILIZER KNEE 3 PNL 19 IN

## (undated) DEVICE — 4.5 MM INCISOR PLUS STRAIGHT                                    BLADES, POWER/EP-1, VIOLET, PACKAGED                                    6 PER BOX, STERILE

## (undated) DEVICE — GOWN,SIRUS,FABRNF,XL,20/CS: Brand: MEDLINE

## (undated) DEVICE — ALLOGRAFT GRAFTLINK CONVENIENCE PACK

## (undated) DEVICE — SUTURE VCRL SZ 3-0 L27IN ABSRB UD L26MM SH 1/2 CIR J416H

## (undated) DEVICE — DISPOSABLE TOURNIQUET CUFF SINGLE BLADDER, DUAL PORT AND QUICK CONNECT CONNECTOR: Brand: COLOR CUFF

## (undated) DEVICE — Z DISCONTINUED USE 2275686 GLOVE SURG SZ 8 L12IN FNGR THK13MIL WHT ISOLEX POLYISOPRENE

## (undated) DEVICE — TOWEL SURG W17XL27IN STD BLU COT NONFENESTRATED PREWASHED

## (undated) DEVICE — KENDALL SCD EXPRESS SLEEVES, KNEE LENGTH, MEDIUM: Brand: KENDALL SCD

## (undated) DEVICE — 5.5 MM ELITE NOTCHBLASTER STRAIGHT                                    DISPOSABLE BURRS, PEACH, 10000                                    MAXIMUM RPM, PACKAGED 6 PER BOX, STERILE

## (undated) DEVICE — KIT INSTR W/ 2.4MM GUIDEPIN SUT PASS WIRE NO2 FIBERWIRE

## (undated) DEVICE — PIN DRL L9.5MM KNEE GUID RG RMR FLIPCUTTER II

## (undated) DEVICE — STERILE POLYISOPRENE POWDER-FREE SURGICAL GLOVES: Brand: PROTEXIS

## (undated) DEVICE — GAUZE SPONGES,12 PLY: Brand: CURITY

## (undated) DEVICE — MAT SUCT W36XL48IN FLD CTRL DISP

## (undated) DEVICE — SURGICAL PROCEDURE PACK BASIN MAJ SET CUST NO CAUT

## (undated) DEVICE — PREP SKN PREVAIL 40ML APPL --

## (undated) DEVICE — SUT ETHLN 3-0 18IN PS2 BLK --

## (undated) DEVICE — DRAPE,EXTREMITY,89X128,STERILE: Brand: MEDLINE

## (undated) DEVICE — GOWN,PREVENTION PLUS,XLN/XL,ST,24/CS: Brand: MEDLINE